# Patient Record
Sex: MALE | Race: WHITE | NOT HISPANIC OR LATINO | Employment: FULL TIME | URBAN - METROPOLITAN AREA
[De-identification: names, ages, dates, MRNs, and addresses within clinical notes are randomized per-mention and may not be internally consistent; named-entity substitution may affect disease eponyms.]

---

## 2017-02-28 ENCOUNTER — HOSPITAL ENCOUNTER (EMERGENCY)
Facility: OTHER | Age: 47
Discharge: HOME | End: 2017-03-01
Attending: EMERGENCY MEDICINE
Payer: COMMERCIAL

## 2017-02-28 DIAGNOSIS — K52.9 ACUTE GASTROENTERITIS: Primary | ICD-10-CM

## 2017-02-28 PROCEDURE — 99283 EMERGENCY DEPT VISIT LOW MDM: CPT | Mod: 25

## 2017-02-28 NOTE — ED AVS SNAPSHOT
[facility] EMERGENCY DEPARTMENT  [address] [place] [address]               Rafa Crowley   2017 11:25 PM   ED    Description:  Male : 1970   Department:  [facility] Emergency Department           Your Care was Coordinated By:     Provider Role From To    Umair Lakhani MD Attending Provider 17 1247 --      Reason for Visit     Nausea           Diagnoses this Visit        Comments    Acute gastroenteritis    -  Primary       ED Disposition     ED Disposition Condition Comment    Discharge  Patient discharged to home in stable condition.              To Do List           Follow-up Information     Please follow up.    Why:  Follow up with a Primary Care Physician, for re-evaluation of today's complaint, and ongoing care, See resource form to find a doctor nearby.       These Medications        Disp Refills Start End    ondansetron (ZOFRAN) 4 MG tablet 12 tablet 0 3/1/2017     Take 1 tablet (4 mg total) by mouth every 6 (six) hours as needed for Nausea. - Oral    Pharmacy: [facility]/pharmacy #[id number] - [place] - [address] [facility] Ph #: [phone]         [facility]     [facility] On Call Nurse Care Line -  Assistance  Registered nurses in the [facility] Center provide clinical advisement, health education, appointment booking, and other advisory services.  Call for this free service at [phone].             Medications           Message regarding Medications     Verify the changes and/or additions to your medication regime listed below are the same as discussed with your clinician today.  If any of these changes or additions are incorrect, please notify your healthcare provider.        START taking these NEW medications        Refills    ondansetron (ZOFRAN) 4 MG tablet 0    Sig: Take 1 tablet (4 mg total) by mouth every 6 (six) hours as needed for Nausea.    Class: Normal    Route: Oral      These medications were administered today        Dose Freq     ondansetron disintegrating tablet 4 mg 4 mg ED 1 Time    Sig: Take 1 tablet (4 mg total) by mouth ED 1 Time.    Class: Normal    Route: Oral           Verify that the below list of medications is an accurate representation of the medications you are currently taking.  If none reported, the list may be blank. If incorrect, please contact your healthcare provider. Carry this list with you in case of emergency.           Current Medications     ondansetron (ZOFRAN) 4 MG tablet Take 1 tablet (4 mg total) by mouth every 6 (six) hours as needed for Nausea.           Clinical Reference Information           Your Vitals Were     BP                   121/86           Allergies as of 3/1/2017        Reactions    Penicillins       Immunizations Administered on Date of Encounter - 3/1/2017     None      ED Micro, Lab, POCT     Start Ordered       Status Ordering Provider    03/01/17 0042 03/01/17 0042  POCT CMP  Once      Final result     03/01/17 0010 03/01/17 0010  POCT CBC  Once      Acknowledged     03/01/17 0010 03/01/17 0010  POCT CMP  Once      Completed       ED Imaging Orders     None        Discharge Instructions         Diet for Vomiting or Diarrhea (Adult)    Your symptoms may return or get worse after eating certain foods listed below. If this happens, stop eating these foods until your symptoms ease and you feel better.  Once the vomiting stops, follow the steps below.   During the first 12 to 24 hours  During the first 12 to 24 hours, follow this diet:  · Drinks. Plain water, sport drinks like electrolyte solutions, soft drinks without caffeine, mineral water (plain or flavored), clear fruit juices, and decaffeinated tea and coffee.  · Soups. Clear broth.  · Desserts. Plain gelatin, popsicles, and fruit juice bars. As you feel better, you may add 6 to 8 ounces of yogurt per day. If you have diarrhea, don't have foods or drinks that contain sugar, high-fructose corn syrup, or sugar alcohols.  During the next 24  hours  During the next 24 hours you may add the following to the above:  · Hot cereal, plain toast, bread, rolls, and crackers  · Plain noodles, rice, mashed potatoes, and chicken noodle or rice soup  · Unsweetened canned fruit (but not pineapple) and bananas  Don't eat more than 15 grams of fat a day. Do this by staying away from margarine, butter, oils, mayonnaise, sauces, gravies, fried foods, peanut butter, meat, poultry, and fish.  Don't eat much fiber. Stay away from raw or cooked vegetables, fresh fruits (except bananas), and bran cereals.  Limit how much caffeine and chocolate you have. Do not use any spices or seasonings except salt.  During the next 24 hours  Slowly go back to your normal diet, as you feel better and your symptoms ease.  Date Last Reviewed: 8/1/2016  © 0008-2171 [employer] [address] [place] [address]. All rights reserved. This information is not intended as a substitute for professional medical care. Always follow your healthcare professional's instructions.          [facility] Sign-Up     Activating your [facility] account is as easy as 1-2-3!     1) Visit [email or web address], select Sign Up Now, enter this activation code and your date of birth, then select Next.  [email or web address]  Expires: 4/15/2017  1:06 AM      2) Create a username and password to use when you visit [facility] in the future and select a security question in case you lose your password and select Next.    3) Enter your e-mail address and click Sign Up!    Additional Information  If you have questions, please e-mail [email or web address] or call [phone] to talk to our [facility] staff. Remember, [facility] is NOT to be used for urgent needs. For medical emergencies, dial 911.          [facility] Emergency Department complies with applicable Federal civil rights laws and does not discriminate on the basis of race, color, national origin, age, disability, or sex.        Language Assistance  Services     ATTENTION: Language assistance services are available, free of charge. Please call [phone].      ATENCIÓN: Si habla español, tiene a marley disposición servicios gratuitos de asistencia lingüística. Llame al [phone].     CHÚ Ý: N?u b?n nói Ti?ng Vi?t, có các d?ch v? h? tr? ngôn ng? mi?n phí dành cho b?n. G?i s? [phone].

## 2017-03-01 ENCOUNTER — HOSPITAL ENCOUNTER (EMERGENCY)
Facility: OTHER | Age: 47
Discharge: ANOTHER HOSPITAL | End: 2017-03-01
Attending: EMERGENCY MEDICINE
Payer: COMMERCIAL

## 2017-03-01 VITALS
DIASTOLIC BLOOD PRESSURE: 82 MMHG | HEART RATE: 90 BPM | OXYGEN SATURATION: 98 % | SYSTOLIC BLOOD PRESSURE: 120 MMHG | RESPIRATION RATE: 18 BRPM | TEMPERATURE: 98 F

## 2017-03-01 VITALS
OXYGEN SATURATION: 97 % | RESPIRATION RATE: 20 BRPM | BODY MASS INDEX: 33.74 KG/M2 | HEIGHT: 67 IN | HEART RATE: 116 BPM | TEMPERATURE: 99 F | WEIGHT: 215 LBS | DIASTOLIC BLOOD PRESSURE: 87 MMHG | SYSTOLIC BLOOD PRESSURE: 140 MMHG

## 2017-03-01 DIAGNOSIS — R10.84 GENERALIZED ABDOMINAL PAIN: ICD-10-CM

## 2017-03-01 DIAGNOSIS — K56.609 SMALL BOWEL OBSTRUCTION: Primary | ICD-10-CM

## 2017-03-01 LAB
ALBUMIN SERPL-MCNC: 3.8 G/DL (ref 3.3–5.5)
ALBUMIN SERPL-MCNC: 3.8 G/DL (ref 3.3–5.5)
ALBUMIN SERPL-MCNC: 4.1 G/DL (ref 3.3–5.5)
ALP SERPL-CCNC: 61 U/L (ref 42–141)
ALP SERPL-CCNC: 67 U/L (ref 42–141)
ALP SERPL-CCNC: 76 U/L (ref 42–141)
BILIRUB SERPL-MCNC: 0.5 MG/DL (ref 0.2–1.6)
BILIRUB SERPL-MCNC: 0.5 MG/DL (ref 0.2–1.6)
BILIRUB SERPL-MCNC: 0.6 MG/DL (ref 0.2–1.6)
BUN SERPL-MCNC: 17 MG/DL (ref 7–22)
BUN SERPL-MCNC: 20 MG/DL (ref 7–22)
CALCIUM SERPL-MCNC: 9 MG/DL (ref 8–10.3)
CALCIUM SERPL-MCNC: 9.4 MG/DL (ref 8–10.3)
CHLORIDE SERPL-SCNC: 102 MMOL/L (ref 98–108)
CHLORIDE SERPL-SCNC: 99 MMOL/L (ref 98–108)
CREAT SERPL-MCNC: 0.9 MG/DL (ref 0.6–1.2)
CREAT SERPL-MCNC: 0.9 MG/DL (ref 0.6–1.2)
GLUCOSE SERPL-MCNC: 123 MG/DL (ref 73–118)
GLUCOSE SERPL-MCNC: 140 MG/DL (ref 73–118)
POC ALT (SGPT): 26 (ref 10–47)
POC ALT (SGPT): 31 (ref 10–47)
POC ALT (SGPT): 34 (ref 10–47)
POC AMYLASE: 10 (ref 14–97)
POC AST (SGOT): 27 (ref 11–38)
POC AST (SGOT): 41 (ref 11–38)
POC AST (SGOT): 43 (ref 11–38)
POC GGT: 63 (ref 5–65)
POC TCO2: 22 (ref 18–33)
POC TCO2: 22 (ref 18–33)
POTASSIUM BLD-SCNC: 3.9 MMOL/L (ref 3.6–5.1)
POTASSIUM BLD-SCNC: 4.6 MMOL/L (ref 3.6–5.1)
PROTEIN, POC: 7.2 (ref 6.4–8.1)
PROTEIN, POC: 7.6 (ref 6.4–8.1)
PROTEIN, POC: 7.8 (ref 6.4–8.1)
SODIUM BLD-SCNC: 136 MMOL/L (ref 128–145)
SODIUM BLD-SCNC: 140 MMOL/L (ref 128–145)

## 2017-03-01 PROCEDURE — 96375 TX/PRO/DX INJ NEW DRUG ADDON: CPT

## 2017-03-01 PROCEDURE — 63600175 PHARM REV CODE 636 W HCPCS: Performed by: EMERGENCY MEDICINE

## 2017-03-01 PROCEDURE — 96374 THER/PROPH/DIAG INJ IV PUSH: CPT

## 2017-03-01 PROCEDURE — 99285 EMERGENCY DEPT VISIT HI MDM: CPT | Mod: ,,, | Performed by: EMERGENCY MEDICINE

## 2017-03-01 PROCEDURE — 99285 EMERGENCY DEPT VISIT HI MDM: CPT | Mod: 25

## 2017-03-01 PROCEDURE — 99285 EMERGENCY DEPT VISIT HI MDM: CPT | Mod: 25,27

## 2017-03-01 PROCEDURE — 96372 THER/PROPH/DIAG INJ SC/IM: CPT

## 2017-03-01 PROCEDURE — 96361 HYDRATE IV INFUSION ADD-ON: CPT

## 2017-03-01 PROCEDURE — 25000003 PHARM REV CODE 250: Performed by: EMERGENCY MEDICINE

## 2017-03-01 PROCEDURE — 80053 COMPREHEN METABOLIC PANEL: CPT

## 2017-03-01 PROCEDURE — 85025 COMPLETE CBC W/AUTO DIFF WBC: CPT

## 2017-03-01 PROCEDURE — 63600175 PHARM REV CODE 636 W HCPCS: Performed by: INTERNAL MEDICINE

## 2017-03-01 RX ORDER — ONDANSETRON 4 MG/1
4 TABLET, ORALLY DISINTEGRATING ORAL
Status: COMPLETED | OUTPATIENT
Start: 2017-03-01 | End: 2017-03-01

## 2017-03-01 RX ORDER — KETOROLAC TROMETHAMINE 30 MG/ML
30 INJECTION, SOLUTION INTRAMUSCULAR; INTRAVENOUS
Status: COMPLETED | OUTPATIENT
Start: 2017-03-01 | End: 2017-03-01

## 2017-03-01 RX ORDER — ONDANSETRON 2 MG/ML
4 INJECTION INTRAMUSCULAR; INTRAVENOUS
Status: COMPLETED | OUTPATIENT
Start: 2017-03-01 | End: 2017-03-01

## 2017-03-01 RX ORDER — HYDROMORPHONE HYDROCHLORIDE 2 MG/ML
2 INJECTION, SOLUTION INTRAMUSCULAR; INTRAVENOUS; SUBCUTANEOUS
Status: COMPLETED | OUTPATIENT
Start: 2017-03-01 | End: 2017-03-01

## 2017-03-01 RX ORDER — ONDANSETRON 4 MG/1
4 TABLET, FILM COATED ORAL EVERY 6 HOURS PRN
Qty: 12 TABLET | Refills: 0 | Status: SHIPPED | OUTPATIENT
Start: 2017-03-01 | End: 2017-03-15

## 2017-03-01 RX ORDER — ONDANSETRON 4 MG/1
4 TABLET, FILM COATED ORAL EVERY 6 HOURS PRN
Qty: 12 TABLET | Refills: 0 | Status: SHIPPED | OUTPATIENT
Start: 2017-03-01 | End: 2017-03-01

## 2017-03-01 RX ORDER — DICYCLOMINE HYDROCHLORIDE 10 MG/ML
20 INJECTION INTRAMUSCULAR
Status: COMPLETED | OUTPATIENT
Start: 2017-03-01 | End: 2017-03-01

## 2017-03-01 RX ADMIN — ONDANSETRON 4 MG: 2 INJECTION INTRAMUSCULAR; INTRAVENOUS at 06:03

## 2017-03-01 RX ADMIN — SODIUM CHLORIDE 1000 ML: 0.9 INJECTION, SOLUTION INTRAVENOUS at 06:03

## 2017-03-01 RX ADMIN — KETOROLAC TROMETHAMINE 30 MG: 30 INJECTION, SOLUTION INTRAMUSCULAR at 06:03

## 2017-03-01 RX ADMIN — HYDROMORPHONE HYDROCHLORIDE 2 MG: 2 INJECTION, SOLUTION INTRAMUSCULAR; INTRAVENOUS; SUBCUTANEOUS at 09:03

## 2017-03-01 RX ADMIN — ONDANSETRON 4 MG: 4 TABLET, ORALLY DISINTEGRATING ORAL at 12:03

## 2017-03-01 RX ADMIN — DICYCLOMINE HYDROCHLORIDE 20 MG: 20 INJECTION, SOLUTION INTRAMUSCULAR at 06:03

## 2017-03-01 NOTE — DISCHARGE INSTRUCTIONS
Diet for Vomiting or Diarrhea (Adult)    Your symptoms may return or get worse after eating certain foods listed below. If this happens, stop eating these foods until your symptoms ease and you feel better.  Once the vomiting stops, follow the steps below.   During the first 12 to 24 hours  During the first 12 to 24 hours, follow this diet:  · Drinks. Plain water, sport drinks like electrolyte solutions, soft drinks without caffeine, mineral water (plain or flavored), clear fruit juices, and decaffeinated tea and coffee.  · Soups. Clear broth.  · Desserts. Plain gelatin, popsicles, and fruit juice bars. As you feel better, you may add 6 to 8 ounces of yogurt per day. If you have diarrhea, don't have foods or drinks that contain sugar, high-fructose corn syrup, or sugar alcohols.  During the next 24 hours  During the next 24 hours you may add the following to the above:  · Hot cereal, plain toast, bread, rolls, and crackers  · Plain noodles, rice, mashed potatoes, and chicken noodle or rice soup  · Unsweetened canned fruit (but not pineapple) and bananas  Don't eat more than 15 grams of fat a day. Do this by staying away from margarine, butter, oils, mayonnaise, sauces, gravies, fried foods, peanut butter, meat, poultry, and fish.  Don't eat much fiber. Stay away from raw or cooked vegetables, fresh fruits (except bananas), and bran cereals.  Limit how much caffeine and chocolate you have. Do not use any spices or seasonings except salt.  During the next 24 hours  Slowly go back to your normal diet, as you feel better and your symptoms ease.  Date Last Reviewed: 8/1/2016  © 7560-9623 [employer]. [address] [place] [address], [place] [address]. All rights reserved. This information is not intended as a substitute for professional medical care. Always follow your healthcare professional's instructions.

## 2017-03-01 NOTE — ED PROVIDER NOTES
Encounter Date: 2/28/2017       History     Chief Complaint   Patient presents with    Nausea     waves of nausea w multiple episodes of diarrhea today. Extreme thirst/weakness     Review of patient's allergies indicates:   Allergen Reactions    Penicillins      Patient is a 46 y.o. male presenting with the following complaint: diarrhea.   Diarrhea    This is a new problem. The current episode started today. The problem occurs more than 10 times per day. The problem has been gradually improving. The stool consistency is described as watery. Associated symptoms include bloating, chills and myalgias. Pertinent negatives include no abdominal pain, arthralgias, coughing, fever, headaches or vomiting. Risk factors: ate at [employer] last night which he suspects may have been bad. He has tried anti-motility drug for the symptoms. The treatment provided mild relief.     No past medical history on file.  Past Surgical History:   Procedure Laterality Date    knee scope       Family History   Problem Relation Age of Onset    Cancer Mother      kidney    Cancer Father      bladder    Cancer Maternal Aunt      kidney     Social History   Substance Use Topics    Smoking status: Never Smoker    Smokeless tobacco: Not on file    Alcohol use Yes      Comment: socially     Review of Systems   Constitutional: Positive for chills. Negative for fever.   HENT: Negative.    Eyes: Negative.    Respiratory: Negative for cough, shortness of breath and stridor.    Cardiovascular: Negative for chest pain and palpitations.   Gastrointestinal: Positive for bloating, constipation (occasional), diarrhea and nausea. Negative for abdominal pain, blood in stool and vomiting.   Genitourinary: Negative for dysuria and hematuria.   Musculoskeletal: Positive for myalgias. Negative for arthralgias.   Skin: Negative.    Neurological: Negative for dizziness and headaches.   Psychiatric/Behavioral: Negative.    All other systems reviewed and are  negative.      Physical Exam   Initial Vitals   BP Pulse Resp Temp SpO2   02/28/17 2327 02/28/17 2327 02/28/17 2327 02/28/17 2327 02/28/17 2327   121/86 99 18 98.4 °F (36.9 °C) 98 %     Physical Exam    Nursing note and vitals reviewed.  Constitutional: He appears well-developed and well-nourished. He is not diaphoretic. No distress.   HENT:   Head: Normocephalic and atraumatic.   Mouth/Throat: Oropharynx is clear and moist. No oropharyngeal exudate.   Eyes: EOM are normal. Pupils are equal, round, and reactive to light. No scleral icterus.   Neck: Normal range of motion. Neck supple.   Cardiovascular: Normal rate, regular rhythm and intact distal pulses.   No murmur heard.  Pulmonary/Chest: Breath sounds normal. No stridor. No respiratory distress. He has no wheezes. He has no rhonchi. He exhibits no tenderness.   Abdominal: Soft. Bowel sounds are normal. He exhibits no distension. There is no tenderness. There is no rebound and no guarding.   Musculoskeletal: Normal range of motion. He exhibits no edema.   Neurological: He is alert and oriented to person, place, and time. He has normal strength.   Skin: Skin is warm. No pallor.   Psychiatric: He has a normal mood and affect.         ED Course   Procedures  Labs Reviewed   POCT CMP   POCT CMP                               ED Course     Labs Reviewed  Admission on 03/01/2017, Discharged on 03/01/2017   Component Date Value Ref Range Status    Albumin, POC 03/01/2017 3.8  3.3 - 5.5 Final    Alkaline Phosphatase, POC 03/01/2017 67  42 - 141 Final    ALT (SGPT), POC 03/01/2017 34  10.0 - 47.0 Final    AST (SGOT), POC 03/01/2017 43  11.0 - 38 Final    POC BUN 03/01/2017 20  7.0 - 22.0 Final    Calcium, POC 03/01/2017 9.4  8.0 - 10.3 Final    POC Chloride 03/01/2017 99  98 - 108 Final    POC Creatinine 03/01/2017 0.9  0.6 - 1.2 Final    POC Glucose 03/01/2017 140  73 - 118 Final    POC Potassium 03/01/2017 4.6  3.6 - 5.1 Final    POC Sodium 03/01/2017 136  128  - 145 Final    Bilirubin 03/01/2017 0.5  0.2 - 1.6 Final    POC TCO2 03/01/2017 22  18 - 33 Final    Protein 03/01/2017 7.6  6.4 - 8.1 Final    Albumin, POC 03/01/2017 4.1  3.3 - 5.5 Final    Alkaline Phosphatase, POC 03/01/2017 61  42 - 141 Final    ALT (SGPT), POC 03/01/2017 26  10.0 - 47.0 Final    Amylase, POC 03/01/2017 10  14 - 97 Final    AST (SGOT), POC 03/01/2017 41  11.0 - 38 Final    POC GGT 03/01/2017 63  5.0 - 65.0 Final    Bilirubin 03/01/2017 0.6  0.2 - 1.6 Final    Protein 03/01/2017 7.8  6.4 - 8.1 Final   Admission on 02/28/2017, Discharged on 03/01/2017   Component Date Value Ref Range Status    Albumin, POC 03/01/2017 3.8  3.3 - 5.5 Final    Alkaline Phosphatase, POC 03/01/2017 76  42 - 141 Final    ALT (SGPT), POC 03/01/2017 31  10.0 - 47.0 Final    AST (SGOT), POC 03/01/2017 27  11.0 - 38 Final    POC BUN 03/01/2017 17  7.0 - 22.0 Final    Calcium, POC 03/01/2017 9.0  8.0 - 10.3 Final    POC Chloride 03/01/2017 102  98 - 108 Final    POC Creatinine 03/01/2017 0.9  0.6 - 1.2 Final    POC Glucose 03/01/2017 123  73 - 118 Final    POC Potassium 03/01/2017 3.9  3.6 - 5.1 Final    POC Sodium 03/01/2017 140  128 - 145 Final    Bilirubin 03/01/2017 0.5  0.2 - 1.6 Final    POC TCO2 03/01/2017 22  18 - 33 Final    Protein 03/01/2017 7.2  6.4 - 8.1 Final        Imaging Reviewed    Imaging Results     None          Medications given in ED    Medications   ondansetron disintegrating tablet 4 mg (4 mg Oral Given 3/1/17 0045)       Discharge Medications     Discharge Medication List as of 3/1/2017  1:07 AM      START taking these medications    Details   ondansetron (ZOFRAN) 4 MG tablet Take 1 tablet (4 mg total) by mouth every 6 (six) hours as needed for Nausea., Starting 3/1/2017, Until Discontinued, Normal                   Patient discharged to home in stable condition with instructions to:   1. Please take all meds as prescribed.  2. Follow-up with your primary care doctor   3.  Return precautions discussed and patient and/or family/caretaker understands to return to the emergency room for any concerns including worsening of your current symptoms, fever, chills, night sweats, worsening pain, chest pain, shortness of breath, nausea, vomiting, diarrhea, bleeding, headache, difficulty talking, visual disturbances, weakness, numbness or any other acute concerns    Clinical Impression:   The encounter diagnosis was Acute gastroenteritis.          Umair Lakhani MD  04/01/17 0800

## 2017-03-02 ENCOUNTER — ANESTHESIA EVENT (OUTPATIENT)
Dept: SURGERY | Facility: HOSPITAL | Age: 47
DRG: 337 | End: 2017-03-02
Payer: COMMERCIAL

## 2017-03-02 ENCOUNTER — HOSPITAL ENCOUNTER (INPATIENT)
Facility: HOSPITAL | Age: 47
LOS: 5 days | Discharge: HOME | DRG: 337 | End: 2017-03-07
Attending: EMERGENCY MEDICINE | Admitting: SURGERY
Payer: COMMERCIAL

## 2017-03-02 DIAGNOSIS — Z01.89 ENCOUNTER FOR IMAGING STUDY TO CONFIRM NASOGASTRIC TUBE PLACEMENT: ICD-10-CM

## 2017-03-02 DIAGNOSIS — K56.609 SMALL BOWEL OBSTRUCTION: Primary | ICD-10-CM

## 2017-03-02 DIAGNOSIS — J40 BRONCHITIS: ICD-10-CM

## 2017-03-02 LAB
ANION GAP SERPL CALC-SCNC: 11 MMOL/L
BASOPHILS # BLD AUTO: 0.01 K/UL
BASOPHILS NFR BLD: 0.2 %
BUN SERPL-MCNC: 27 MG/DL
CALCIUM SERPL-MCNC: 9.2 MG/DL
CHLORIDE SERPL-SCNC: 100 MMOL/L
CO2 SERPL-SCNC: 23 MMOL/L
CREAT SERPL-MCNC: 1.1 MG/DL
DIFFERENTIAL METHOD: ABNORMAL
EOSINOPHIL # BLD AUTO: 0 K/UL
EOSINOPHIL NFR BLD: 0.2 %
ERYTHROCYTE [DISTWIDTH] IN BLOOD BY AUTOMATED COUNT: 13.4 %
EST. GFR  (AFRICAN AMERICAN): >60 ML/MIN/1.73 M^2
EST. GFR  (NON AFRICAN AMERICAN): >60 ML/MIN/1.73 M^2
GLUCOSE SERPL-MCNC: 128 MG/DL
HCT VFR BLD AUTO: 45.1 %
HGB BLD-MCNC: 15.4 G/DL
LYMPHOCYTES # BLD AUTO: 1 K/UL
LYMPHOCYTES NFR BLD: 20 %
MAGNESIUM SERPL-MCNC: 2.1 MG/DL
MCH RBC QN AUTO: 29.8 PG
MCHC RBC AUTO-ENTMCNC: 34.1 %
MCV RBC AUTO: 87 FL
MONOCYTES # BLD AUTO: 1.1 K/UL
MONOCYTES NFR BLD: 22.6 %
NEUTROPHILS # BLD AUTO: 2.8 K/UL
NEUTROPHILS NFR BLD: 56.4 %
PHOSPHATE SERPL-MCNC: 2.9 MG/DL
PLATELET # BLD AUTO: 190 K/UL
PMV BLD AUTO: 10 FL
POTASSIUM SERPL-SCNC: 3.7 MMOL/L
RBC # BLD AUTO: 5.17 M/UL
SODIUM SERPL-SCNC: 134 MMOL/L
WBC # BLD AUTO: 5.01 K/UL

## 2017-03-02 PROCEDURE — 25500020 PHARM REV CODE 255: Performed by: SURGERY

## 2017-03-02 PROCEDURE — 63600175 PHARM REV CODE 636 W HCPCS: Performed by: SURGERY

## 2017-03-02 PROCEDURE — 87045 FECES CULTURE AEROBIC BACT: CPT

## 2017-03-02 PROCEDURE — 87046 STOOL CULTR AEROBIC BACT EA: CPT

## 2017-03-02 PROCEDURE — 85025 COMPLETE CBC W/AUTO DIFF WBC: CPT

## 2017-03-02 PROCEDURE — 25000003 PHARM REV CODE 250: Performed by: SURGERY

## 2017-03-02 PROCEDURE — 87209 SMEAR COMPLEX STAIN: CPT

## 2017-03-02 PROCEDURE — 87449 NOS EACH ORGANISM AG IA: CPT

## 2017-03-02 PROCEDURE — 84100 ASSAY OF PHOSPHORUS: CPT

## 2017-03-02 PROCEDURE — 87427 SHIGA-LIKE TOXIN AG IA: CPT

## 2017-03-02 PROCEDURE — 11000001 HC ACUTE MED/SURG PRIVATE ROOM

## 2017-03-02 PROCEDURE — 83735 ASSAY OF MAGNESIUM: CPT

## 2017-03-02 PROCEDURE — 25000003 PHARM REV CODE 250: Performed by: FAMILY MEDICINE

## 2017-03-02 PROCEDURE — 94761 N-INVAS EAR/PLS OXIMETRY MLT: CPT

## 2017-03-02 PROCEDURE — 99223 1ST HOSP IP/OBS HIGH 75: CPT | Mod: ,,, | Performed by: SURGERY

## 2017-03-02 PROCEDURE — 80048 BASIC METABOLIC PNL TOTAL CA: CPT

## 2017-03-02 PROCEDURE — 25500020 PHARM REV CODE 255: Performed by: EMERGENCY MEDICINE

## 2017-03-02 RX ORDER — ENOXAPARIN SODIUM 100 MG/ML
40 INJECTION SUBCUTANEOUS
Status: DISCONTINUED | OUTPATIENT
Start: 2017-03-03 | End: 2017-03-07 | Stop reason: HOSPADM

## 2017-03-02 RX ORDER — SODIUM CHLORIDE 0.9 % (FLUSH) 0.9 %
3 SYRINGE (ML) INJECTION EVERY 8 HOURS
Status: DISCONTINUED | OUTPATIENT
Start: 2017-03-02 | End: 2017-03-07 | Stop reason: HOSPADM

## 2017-03-02 RX ORDER — POTASSIUM CHLORIDE 7.45 MG/ML
10 INJECTION INTRAVENOUS
Status: DISPENSED | OUTPATIENT
Start: 2017-03-02 | End: 2017-03-02

## 2017-03-02 RX ORDER — POTASSIUM CHLORIDE 7.45 MG/ML
30 INJECTION INTRAVENOUS ONCE
Status: COMPLETED | OUTPATIENT
Start: 2017-03-02 | End: 2017-03-02

## 2017-03-02 RX ORDER — MORPHINE SULFATE 2 MG/ML
2 INJECTION, SOLUTION INTRAMUSCULAR; INTRAVENOUS
Status: DISCONTINUED | OUTPATIENT
Start: 2017-03-02 | End: 2017-03-03

## 2017-03-02 RX ORDER — LIDOCAINE HYDROCHLORIDE 20 MG/ML
JELLY TOPICAL
Status: COMPLETED | OUTPATIENT
Start: 2017-03-02 | End: 2017-03-02

## 2017-03-02 RX ORDER — SODIUM CHLORIDE 9 MG/ML
INJECTION, SOLUTION INTRAVENOUS CONTINUOUS
Status: DISCONTINUED | OUTPATIENT
Start: 2017-03-02 | End: 2017-03-07

## 2017-03-02 RX ORDER — ONDANSETRON 2 MG/ML
4 INJECTION INTRAMUSCULAR; INTRAVENOUS EVERY 8 HOURS PRN
Status: DISCONTINUED | OUTPATIENT
Start: 2017-03-02 | End: 2017-03-07 | Stop reason: HOSPADM

## 2017-03-02 RX ORDER — DIPHENHYDRAMINE HYDROCHLORIDE 50 MG/ML
25 INJECTION INTRAMUSCULAR; INTRAVENOUS EVERY 4 HOURS PRN
Status: DISCONTINUED | OUTPATIENT
Start: 2017-03-02 | End: 2017-03-07 | Stop reason: HOSPADM

## 2017-03-02 RX ADMIN — IOHEXOL 250 ML: 350 INJECTION, SOLUTION INTRAVENOUS at 08:03

## 2017-03-02 RX ADMIN — MORPHINE SULFATE 2 MG: 2 INJECTION, SOLUTION INTRAMUSCULAR; INTRAVENOUS at 04:03

## 2017-03-02 RX ADMIN — ONDANSETRON 4 MG: 2 INJECTION INTRAMUSCULAR; INTRAVENOUS at 02:03

## 2017-03-02 RX ADMIN — LIDOCAINE HYDROCHLORIDE 10 ML: 20 JELLY TOPICAL at 01:03

## 2017-03-02 RX ADMIN — IOHEXOL 100 ML: 350 INJECTION, SOLUTION INTRAVENOUS at 05:03

## 2017-03-02 RX ADMIN — ONDANSETRON 4 MG: 2 INJECTION INTRAMUSCULAR; INTRAVENOUS at 04:03

## 2017-03-02 RX ADMIN — PROMETHAZINE HYDROCHLORIDE 12.5 MG: 25 INJECTION, SOLUTION INTRAMUSCULAR; INTRAVENOUS at 07:03

## 2017-03-02 RX ADMIN — POTASSIUM CHLORIDE 30 MEQ: 10 INJECTION, SOLUTION INTRAVENOUS at 03:03

## 2017-03-02 RX ADMIN — Medication 3 ML: at 06:03

## 2017-03-02 RX ADMIN — Medication 3 ML: at 02:03

## 2017-03-02 RX ADMIN — SODIUM CHLORIDE: 0.9 INJECTION, SOLUTION INTRAVENOUS at 04:03

## 2017-03-02 NOTE — PLAN OF CARE
Problem: Patient Care Overview  Goal: Plan of Care Review  Outcome: Ongoing (interventions implemented as appropriate)  Plan of care reviewed and updated. Pt AA+O. Pt's pain is managed with the medication ordered at this time. Pt's VS are as charted.  No falls this shift. Pt is oriented to room and call system. Will continue to [facility].

## 2017-03-02 NOTE — H&P
"History & Physical  Surgery      SUBJECTIVE:     Chief Complaint/Reason for Admission: nausea/vomiting    History of Present Illness: Rafa Crowley is a 46 y.o. male with no significant surgical or medical history who presents with symptoms since yesterday morning at 10am. He began having nausea and large volume emesis and diarrhea that looked like "dishwater." He has had 15-20 watery stools and 10 episodes of vomiting during this time. He also reports abdominal and back pain and chills. He denies sick contacts. He has never had abdominal surgery.       Current Facility-Administered Medications on File Prior to Encounter   Medication    [COMPLETED] dicyclomine injection 20 mg    [COMPLETED] hydromorphone (PF) injection 2 mg    [COMPLETED] ketorolac injection 30 mg    [COMPLETED] ondansetron injection 4 mg    [COMPLETED] sodium chloride 0.9% bolus 1,000 mL     Current Outpatient Prescriptions on File Prior to Encounter   Medication Sig    ondansetron (ZOFRAN) 4 MG tablet Take 1 tablet (4 mg total) by mouth every 6 (six) hours as needed for Nausea.       Review of patient's allergies indicates:   Allergen Reactions    Penicillins        History reviewed. No pertinent past medical history.  Past Surgical History:   Procedure Laterality Date    knee scope       Family History   Problem Relation Age of Onset    Cancer Mother      kidney    Cancer Father      bladder    Cancer Maternal Aunt      kidney     Social History   Substance Use Topics    Smoking status: Never Smoker    Smokeless tobacco: None    Alcohol use Yes      Comment: socially        Review of Systems   Constitutional: Positive for chills, diaphoresis and fatigue. Negative for fever and unexpected weight change.   Respiratory: Negative for cough and shortness of breath.    Cardiovascular: Negative for chest pain and palpitations.   Gastrointestinal: Positive for abdominal distention, abdominal pain, diarrhea, nausea and vomiting. Negative " for constipation.   Genitourinary: Negative for dysuria and hematuria.   Musculoskeletal: Negative for back pain.   Neurological: Negative for tremors and weakness.     OBJECTIVE:     Vital Signs (Most Recent)  Temp: 98.3 °F (36.8 °C) (03/01/17 2252)  Pulse: 104 (03/02/17 0202)  Resp: 18 (03/01/17 2252)  BP: (!) 157/77 (03/02/17 0202)  SpO2: 100 % (03/02/17 0202)    Physical Exam   Constitutional: He is oriented to person, place, and time. He appears well-developed and well-nourished. No distress.   HENT:   Head: Normocephalic and atraumatic.   Eyes: No scleral icterus.   Neck: No JVD present.   Cardiovascular: Normal heart sounds and intact distal pulses.    Pulmonary/Chest: Breath sounds normal. No respiratory distress.   Abdominal: Soft. Bowel sounds are normal. He exhibits distension. There is no tenderness. There is no rebound and no guarding. No hernia.   Musculoskeletal: He exhibits no edema.   Neurological: He is alert and oriented to person, place, and time.   Skin: Skin is warm and dry.   Psychiatric: He has a normal mood and affect.     Laboratory  CBC: No results for input(s): WBC, RBC, HGB, HCT, PLT, MCV, MCH, MCHC in the last 168 hours.  BMP: No results for input(s): GLU, NA, K, CL, CO2, BUN, CREATININE, CALCIUM, MG in the last 168 hours.  No results for input(s): COLORU, CLARITYU, SPECGRAV, PHUR, PROTEINUA, GLUCOSEU, BILIRUBINCON, BLOODU, WBCU, RBCU, BACTERIA, MUCUS, NITRITE, LEUKOCYTESUR, UROBILINOGEN, HYALINECASTS in the last 168 hours.    Diagnostic Results:  FINDINGS:  The visualized lung bases are unremarkable.  There are no renal stones and no hydronephrosis.  Noncontrast images demonstrate no   definite renal masses.  There are no ureteral calculi or hydroureter.  The bladder is   normal.  The images of the liver, spleen, pancreas, adrenals and gallbladder are normal.  Proximal small bowel show diffuse dilation up to 4.5 cm diameter with multiple air-fluid   levels.  Small bowel gradually  transition to collapsed state in the right lower quadrant.    The appendix is unremarkable.  Colon does not show appreciable wall thickening or   diverticular disease  There is no abdominal lymphadenopathy. There is no pneumoperitoneum or ascites. The   retroperitoneal region appears unremarkable.  The abdominal aorta appears unremarkable without aneursym.  The inferior vena cava appears unremarkable.  There is no pelvic lymphadenopathy or ascites. The inguinal regions are unremarkable.  There are no definite significant osseous abnormalities seen.     IMPRESSION:  Proximal small bowel dilation with multiple air-fluid levels and gradual transition to   collapsed state.  In the proper clinical context, at least partial small bowel   obstruction should be considered.    ASSESSMENT/PLAN:     A/P:    45yo M with nausea, emesis, possible pSBO    Admit to inpatient  Bolus IVF and MIVF  NPO  NG to LIWS  DVT/GI prophylaxis  Repeat CT with PO/IV contrast this morning -- if consistent with SBO, will need diagnostic laparoscopy/laparotomy given no surgical history

## 2017-03-02 NOTE — PROGRESS NOTES
Progress Note  General Surgery    Admit Date: 3/2/2017  Post-operative Day:    Hospital Day: 1    SUBJECTIVE:     Pt seen and examined, no acute events overnight, denies nausea/vomiting, states pain has improved since placement of NG, denies back pain    Scheduled Meds:   [START ON 3/3/2017] enoxaparin  40 mg Subcutaneous Q24H    sodium chloride 0.9%  3 mL Intravenous Q8H     Continuous Infusions:   sodium chloride 0.9% 125 mL/hr at 03/02/17 0416     PRN Meds:diphenhydrAMINE, morphine, ondansetron, promethazine (PHENERGAN) IVPB    Review of patient's allergies indicates:   Allergen Reactions    Penicillins      OBJECTIVE:     Vital Signs (Most Recent)  Temp: 96.7 °F (35.9 °C) (03/02/17 0628)  Pulse: 73 (03/02/17 0628)  Resp: 16 (03/02/17 0628)  BP: 138/71 (03/02/17 0628)  SpO2: 98 % (03/02/17 0628)    Vital Signs Range (Last 24H):  Temp:  [96.7 °F (35.9 °C)-99.4 °F (37.4 °C)]   Pulse:  []   Resp:  [16-20]   BP: (124-157)/(71-89)   SpO2:  [97 %-100 %]     I & O (Last 24H):  Intake/Output Summary (Last 24 hours) at 03/02/17 0735  Last data filed at 03/02/17 0654   Gross per 24 hour   Intake                0 ml   Output             1750 ml   Net            -1750 ml     Physical Exam:  General: well developed, well nourished, no distress  Head: normocephalic, atraumatic  Lungs:  normal respiratory effort  Heart: regular rate and rhythm  Abdomen: soft, mild distension, minimal TTP, no hernias appreciated    Laboratory:  CBC:   Recent Labs  Lab 03/02/17 0444   WBC 5.01   RBC 5.17   HGB 15.4   HCT 45.1      MCV 87   MCH 29.8   MCHC 34.1     BMP:   Recent Labs  Lab 03/02/17 0444   *   *   K 3.7      CO2 23   BUN 27*   CREATININE 1.1   CALCIUM 9.2     CMP:   Recent Labs  Lab 03/02/17  0444   *   CALCIUM 9.2   *   K 3.7   CO2 23      BUN 27*   CREATININE 1.1     Labs within the past 24 hours have been reviewed.      ASSESSMENT/PLAN:   47 yo male w pSBO, no previous  surgical history  -plan for upper GI w small bowel follow through today, +/- diagnostic laparotomy tomorrow depending on results  -continue NPO, IVF, NGT  -serial abdominal exams  -hypokalemia - replace  -pain/nausea meds PRN    Shanna Allen PA-C  [phone]

## 2017-03-02 NOTE — IP AVS SNAPSHOT
[facility]  [address]  [facility] [address]  Phone: [phone]           Patient Discharge Instructions     Our goal is to set you up for success. This packet includes information on your condition, medications, and your home care. It will help you to care for yourself so you don't get sicker and need to go back to the hospital.     Please ask your nurse if you have any questions.        There are many details to remember when preparing to leave the hospital. Here is what you will need to do:    1. Take your medicine. If you are prescribed medications, review your Medication List in the following pages. You may have new medications to  at the pharmacy and others that you'll need to stop taking. Review the instructions for how and when to take your medications. Talk with your doctor or nurses if you are unsure of what to do.     2. Go to your follow-up appointments. Specific follow-up information is listed in the following pages. Your may be contacted by a transition nurse or clinical provider about future appointments. Be sure we have all of the phone numbers to reach you, if needed. Please contact your provider's office if you are unable to make an appointment.     3. Watch for warning signs. Your doctor or nurse will give you detailed warning signs to watch for and when to call for assistance. These instructions may also include educational information about your condition. If you experience any of warning signs to your health, call your doctor.               [facility]  Unless otherwise directed by your provider, please contact [facility], our nurse care line that is available for 24/7 assistance.     [phone] (toll-free)    Registered nurses in the [facility] provide clinical advisement, health education, appointment booking, and other advisory services.                    ** Verify the list of medication(s) below is accurate and up  to date. Carry this with you in case of emergency. If your medications have changed, please notify your healthcare provider.             Medication List      START taking these medications        Additional Info                      docusate sodium 100 MG capsule   Commonly known as:  COLACE   Refills:  0   Dose:  100 mg    Instructions:  Take 1 capsule (100 mg total) by mouth 2 (two) times daily as needed for Constipation.     Begin Date    AM    Noon    PM    Bedtime       oxycodone-acetaminophen 5-325 mg per tablet   Commonly known as:  PERCOCET   Quantity:  51 tablet   Refills:  0   Dose:  1 tablet    Instructions:  Take 1 tablet by mouth every 4 (four) hours as needed.     Begin Date    AM    Noon    PM    Bedtime       polyethylene glycol 17 gram/dose powder   Commonly known as:  GLYCOLAX   Quantity:  289 g   Refills:  0   Dose:  17 g    Instructions:  Take 17 g by mouth once daily.     Begin Date    AM    Noon    PM    Bedtime         CONTINUE taking these medications        Additional Info                      ondansetron 4 MG tablet   Commonly known as:  ZOFRAN   Quantity:  12 tablet   Refills:  0   Dose:  4 mg    Instructions:  Take 1 tablet (4 mg total) by mouth every 6 (six) hours as needed for Nausea.     Begin Date    AM    Noon    PM    Bedtime            Where to Get Your Medications      These medications were sent to [facility]/pharmacy #[id number] - [place] - [address] [facility]  [address] [place] [address]     Phone:  [phone]     polyethylene glycol 17 gram/dose powder         You can get these medications from any pharmacy     Bring a paper prescription for each of these medications     oxycodone-acetaminophen 5-325 mg per tablet       You don't need a prescription for these medications     docusate sodium 100 MG capsule                  Please bring to all follow up appointments:    1. A copy of your discharge instructions.  2. All medicines you are currently taking in their original  bottles.  3. Identification and insurance card.    Please arrive 15 minutes ahead of scheduled appointment time.    Please call 24 hours in advance if you must reschedule your appointment and/or time.        Your Scheduled Appointments     Mar 15, 2017 10:00 AM CDT   Post OP with Rafa Arreguin MD   [facility] ([facility] )    [address]  [facility] [address]   [phone]              Follow-up Information     Follow up with Rafa Arreguin MD In 2 weeks.    Specialty:  General Surgery    Why:  Appt 3/15/17 at 10:00 AM    Contact information:    [address]  [facility] [address]  [phone]        Referrals     Future Orders    Referral to OutPatient  Physical therapy     Questions:    Post Surgical?:  Yes    Eval and Treat:  Yes    Duration:      Frequency (times per week):      Precautions:      Location:      OT Location:      Restore Functional ADL Training?:      Gait Training:      Therapeutic Exercises:      Wound Care:      Traction:      Electric Stimulation:      IONTO.:      U/S:      Developmental Stimulation?:      Specialty Programs:          Discharge Instructions     Future Orders    Call MD for:  difficulty breathing or increased cough     Call MD for:  increased confusion or weakness     Call MD for:  persistent nausea and vomiting or diarrhea     Call MD for:  redness, tenderness, or signs of infection (pain, swelling, redness, odor or green/yellow discharge around incision site)     Call MD for:  severe uncontrolled pain     Call MD for:  temperature >100.4     Call MD for:  worsening rash     Diet general     Questions:    Total calories:      Fat restriction, if any:      Protein restriction, if any:      Na restriction, if any:      Fluid restriction:      Additional restrictions:      Lifting restrictions     Comments:    No lifting weights greater than 10 pounds for 6 weeks after surgery.    No dressing needed     Shower on day dressing  "removed (No bath)     Weight bearing restrictions (specify)     Comments:    No carrying weights greater than 10 pounds for 6 weeks after surgery.        Primary Diagnosis     Your primary diagnosis was:  Small Bowel Obstruction      Admission Information     Date & Time Provider Department CSN    3/2/2017 12:47 AM Rafa Arreguin MD [facility] [id number]      Care Providers     Provider Role Specialty Primary office phone    Rafa Arreguin MD Attending Provider General Surgery [phone]    Rafa Arreguin MD Surgeon  General Surgery [phone]      Your Vitals Were     BP Pulse Temp Resp Height Weight    140/88 (BP Location: Right arm, Patient Position: Lying, BP Method: Automatic) 73 97.9 °F (36.6 °C) (Oral) 16 5' 7" (1.702 m) 97.5 kg (215 lb)    SpO2 BMI             98% 33.67 kg/m2         Recent Lab Values     No lab values to display.      Allergies as of 3/7/2017        Reactions    Penicillins       Advance Directives     An advance directive is a document which, in the event you are no longer able to make decisions for yourself, tells your healthcare team what kind of treatment you do or do not want to receive, or who you would like to make those decisions for you.  If you do not currently have an advance directive, [facility] encourages you to create one.  For more information call:  [phone] ([phone]), [phone] ([phone]),  or log on to [email or web address].        Language Assistance Services     ATTENTION: Language assistance services are available, free of charge. Please call [phone].      ATENCIÓN: Si habla español, tiene a marley disposición servicios gratuitos de asistencia lingüística. Llame al [phone].     CHÚ Ý: N?u b?n nói Ti?ng Vi?t, có các d?ch v? h? tr? ngôn ng? mi?n phí dành cho b?n. G?i s? [phone].        [facility] Sign-Up     Activating your [facility] account is as easy as 1-2-3!     1) Visit [email or web address], select " Sign Up Now, enter this activation code and your date of birth, then select Next.  [email or web address]  Expires: 4/15/2017  1:06 AM      2) Create a username and password to use when you visit [facility] in the future and select a security question in case you lose your password and select Next.    3) Enter your e-mail address and click Sign Up!    Additional Information  If you have questions, please e-mail [email or web address] or call [phone] to talk to our [facility] staff. Remember, [facility] is NOT to be used for urgent needs. For medical emergencies, dial 911.          [facility] complies with applicable Federal civil rights laws and does not discriminate on the basis of race, color, national origin, age, disability, or sex.

## 2017-03-02 NOTE — ANESTHESIA PREPROCEDURE EVALUATION
03/02/2017  Rafa Crowley is a 46 y.o., male who presented with nausea and vomiting and diarrhea with concern for small bowel obstruction. Patient is scheduled for the procedure below.    Pre-operative evaluation for LAPAROSCOPY-DIAGNOSTIC (N/A)    LDA:  PIV 20G L AC  NGT    Previous Airway: none on file    Drips:  NS @ 125 ml/hr      Past Surgical History:   Procedure Laterality Date    knee scope           Vital Signs Range (Last 24H):  Temp:  [35.9 °C (96.7 °F)-37.4 °C (99.4 °F)]   Pulse:  []   Resp:  [16-20]   BP: (124-157)/(71-89)   SpO2:  [97 %-100 %]       CBC:     Recent Labs  Lab 03/02/17  0444   WBC 5.01   RBC 5.17   HGB 15.4   HCT 45.1      MCV 87   MCH 29.8   MCHC 34.1       CMP:   Recent Labs  Lab 03/02/17  0444   *   K 3.7      CO2 23   BUN 27*   CREATININE 1.1   *   MG 2.1   PHOS 2.9   CALCIUM 9.2       INR:  No results for input(s): INR, PROTIME, APTT in the last 720 hours.    Invalid input(s): PT      Diagnostic Studies:  CT Abd Pelvis:   The lung bases demonstrate bibasilar atelectatic changes.  There is no pleural fluid present.  The visualized portions of the heart appear normal.    The liver is normal in size and attenuation with no focal hepatic abnormality.  The gallbladder is unremarkable.  There is no intra-or extrahepatic biliary ductal dilatation.    The stomach, spleen, pancreas, and adrenal glands are unremarkable.  An accessory splenule is noted.  There is an enterogastric tube with its distal tip terminating in the stomach.    The kidneys are normal in size and location and concentrate and excrete contrast properly on delayed imaging.  There is no evidence of hydronephrosis.  The ureters appear normal in course and caliber without evidence of ureteral dilatation. The urinary bladder demonstrates no significant abnormality allowing for limited  evaluation secondary to nondistention.    The abdominal aorta is normal in course and caliber without significant atherosclerotic calcifications.    There marked distention of numerous loops of proximal small bowel, which measure up to 4.5 cm in maximal diameter, within the left midabdomen.  There is associated small bowel air-fluid levels.  Enteric contrast is not identified within several of the more distal loops of dilated small bowel.  There is no evidence of pneumatosis.  There are decompressed loops of small bowel within the right lower quadrant with slight collapse of the colon.  Overall, findings highly concerning for small bowel obstruction transition point in the right lower quadrant.    There is no significant ascites, free intraperitoneal air or portal venous gas identified There is no evidence of lymph node enlargement in the abdomen or pelvis.    When viewed with bone windows the osseous structures are unremarkable.  The extraperitoneal soft tissues are unremarkable.    EKG: none on file      2D Echo: none on file        [facility] Anesthesia Evaluation    I have reviewed the Patient Summary Reports.    I have reviewed the Nursing Notes.   I have reviewed the Medications.     Review of Systems  Anesthesia Hx:  History of prior surgery of interest to airway management or planning: Denies Family Hx of Anesthesia complications.   Denies Personal Hx of Anesthesia complications.   EENT/Dental:EENT/Dental Normal   Cardiovascular:  Cardiovascular Normal     Pulmonary:  Pulmonary Normal    Renal/:  Renal/ Normal     Hepatic/GI:   Small bowel obstruction   Musculoskeletal:  Musculoskeletal Normal    Neurological:  Neurology Normal    Endocrine:  Endocrine Normal    Psych:  Psychiatric Normal           Physical Exam  General:  Well nourished    Airway/Jaw/Neck:  Airway Findings: Mouth Opening: Normal Tongue: Normal  General Airway Assessment: Adult  Mallampati: II  TM Distance: Normal, at least 6 cm       Dental:  Dental Findings: In tact   Chest/Lungs:  Chest/Lungs Findings: Clear to auscultation, Normal Respiratory Rate     Heart/Vascular:  Heart Findings: Rate: Normal  Rhythm: Regular Rhythm        Mental Status:  Mental Status Findings:  Cooperative, Alert and Oriented         Anesthesia Plan  Type of Anesthesia, risks & benefits discussed:  Anesthesia Type:  general  Patient's Preference:   Intra-op Monitoring Plan: standard ASA monitors  Intra-op Monitoring Plan Comments:   Post Op Pain Control Plan:   Post Op Pain Control Plan Comments:   Induction:   IV  Beta Blocker:  Patient is not currently on a Beta-Blocker (No further documentation required).       Informed Consent: Patient understands risks and agrees with Anesthesia plan.  Questions answered. Anesthesia consent signed with patient.  ASA Score: 2     Day of Surgery Review of History & Physical:    H&P update referred to the surgeon.         Ready For Surgery From Anesthesia Perspective.

## 2017-03-02 NOTE — ED TRIAGE NOTES
Pt presents to ED as transfer from [facility] ER for small bowel obstruction. Pt presented to [facility] for nausea, vomiting and abdominal pain.     LOC: Patient name and date of birth verified.  The patient is awake, alert and aware of environment with an appropriate affect, the patient is oriented x 3 and speaking appropriately.  Pt in NAD.    APPEARANCE: Patient resting comfortably and in no acute distress, patient is clean and well groomed, patient's clothing is properly fastened.  SKIN: The skin is warm and dry, color consistent with ethnicity, patient has normal skin turgor and moist mucus membranes, skin intact, no breakdown or brusing noted.  MUSCULOSKELETAL: Patient moving all extremities well, no obvious swelling or deformities noted.  RESPIRATORY: Airway is open and patent, respirations are spontaneous, patient has a normal effort and rate, no accessory muscle use noted.  CARDIAC: Patient has a normal rate and rhythm, no periphreal edema noted, capillary refill < 3 seconds.  ABDOMEN: Soft and non tender to palpation, no distention noted. Bowel sounds present in all four quadrants.Pt reports nausea and vomiting.   NEUROLOGIC: Eyes open spontaneously, behavior appropriate to situation, follows commands, facial expression symmetrical, bilateral hand grasp equal and even, purposeful motor response noted, normal sensation in all extremities when touched with a finger.

## 2017-03-02 NOTE — ED PROVIDER NOTES
Encounter Date: 3/1/2017       History     Chief Complaint   Patient presents with    Small Bowel Obstruction     pt transferred from [facility]     Review of patient's allergies indicates:   Allergen Reactions    Penicillins      HPI Comments: 46-year-old male with no significant past medical history presents as a transfer for evaluation of a partial small bowel obstruction.  Patient is been having intermittent abdominal pain for the past 24 hours associated with diarrhea and nausea and vomiting.  CT done outside facility showed a partial small bowel obstruction.  He is been sent to [facility] for general surgery  evaluation.  At this time pain is under control with his last dose of IV Dilaudid from the outside hospital.  Last episode of emesis was 6 hours ago.  He is ALLERGIC to penicillin.  Patient reports distention of his abdomen over the past 10 hours.    The history is provided by the patient and medical records. No  was used.     History reviewed. No pertinent past medical history.  Past Surgical History:   Procedure Laterality Date    knee scope       Family History   Problem Relation Age of Onset    Cancer Mother      kidney    Cancer Father      bladder    Cancer Maternal Aunt      kidney     Social History   Substance Use Topics    Smoking status: Never Smoker    Smokeless tobacco: None    Alcohol use Yes      Comment: socially     Review of Systems   Constitutional: Negative for fever.   HENT: Negative for nosebleeds.    Respiratory: Negative for chest tightness and shortness of breath.    Cardiovascular: Negative for chest pain.   Gastrointestinal: Positive for abdominal distention, abdominal pain, diarrhea, nausea and vomiting. Negative for constipation.   Genitourinary: Negative for difficulty urinating and dysuria.   Musculoskeletal: Negative for neck stiffness.   Skin: Negative for rash.   Neurological: Negative for weakness and headaches.   Hematological: Negative.     Psychiatric/Behavioral: Negative.    All other systems reviewed and are negative.      Physical Exam   Initial Vitals   BP Pulse Resp Temp SpO2   03/01/17 2252 03/01/17 2252 03/01/17 2252 03/01/17 2252 03/01/17 2252   145/87 90 18 98.3 °F (36.8 °C) 98 %     Physical Exam    Nursing note and vitals reviewed.  Constitutional: He appears well-developed and well-nourished. He is not diaphoretic. No distress.   HENT:   Head: Normocephalic and atraumatic.   Right Ear: External ear normal.   Left Ear: External ear normal.   Eyes: Right eye exhibits no discharge. Left eye exhibits no discharge.   Neck: Normal range of motion. No tracheal deviation present.   Cardiovascular: Normal rate, regular rhythm and normal heart sounds.   No murmur heard.  Pulmonary/Chest: Breath sounds normal. No respiratory distress. He has no wheezes. He has no rhonchi. He has no rales.   Abdominal: He exhibits distension. He exhibits no mass. There is tenderness.   Bowel sounds greatly decreased   Musculoskeletal: Normal range of motion.   Neurological: He is alert and oriented to person, place, and time. He has normal strength. No cranial nerve deficit.   Skin: Skin is warm and dry. No erythema.   Psychiatric: He has a normal mood and affect. His behavior is normal. Judgment and thought content normal.         ED Course   Procedures  Labs Reviewed   CBC W/ AUTO DIFFERENTIAL - Abnormal; Notable for the following:        Result Value    Mono # 1.1 (*)     Mono% 22.6 (*)     All other components within normal limits   BASIC METABOLIC PANEL - Abnormal; Notable for the following:     Sodium 134 (*)     Glucose 128 (*)     BUN, Bld 27 (*)     All other components within normal limits   CULTURE, STOOL   CLOSTRIDIUM DIFFICILE   MAGNESIUM   PHOSPHORUS   STOOL EXAM-OVA,CYSTS,PARASITES     Imaging Results         CT Abdomen Pelvis With Contrast (In process)         X-Ray Abdomen AP 1 View (KUB) (Final result) Result time:  03/02/17 02:10:34    Final  result by Christopher Fish MD (03/02/17 02:10:34)    Impression:             Enteric tube tip in the stomach.    Partial small bowel obstruction pattern.      Electronically signed by: CHRISTOPHER FISH MD  Date:     03/02/17  Time:    02:10     Narrative:    Abdomen, single view.    Indication:.    Findings:    Enteric tube tip in the stomach.    Distended small bowel loops in the upper abdomen. Nondistended colon                      Medical Decision Making:   Initial Assessment:   46-year-old male presents as a transfer from another [facility] facility for partial small bowel disruption  Differential Diagnosis:   Partial SBO, SBO, ileus, nephrolithiasis  ED Management:  Lab work from outside hospital was unremarkable.  White count within normal limits 7K.  We will place an NG tube and talked to the surgical team.  Anticipate admission.  Pain is well-controlled at this time with his last dose of Dilaudid.    HO-II Update:  NG tube placed by RN, Admit orders have been placed by surgery.  We'll continue to monitor while in the ED.    Dyllan Starr MD, PGY- 2  3:34 AM                Attending Attestation:   Physician Attestation Statement for Resident:  As the supervising MD   Physician Attestation Statement: I have personally seen and examined this patient.   I agree with the above history. -:   As the supervising MD I agree with the above PE.    As the supervising MD I agree with the above treatment, course, plan, and disposition.   -: Presentation consistent with SBO.  Also considered mesenteric ischemia, appy, cholecystitis, pancreatitis, constipation but ultimately all felt to be unlikely.  Surgery accepted for admission.    I have reviewed and agree with the residents interpretation of the following: lab data, x-rays and CT scans.  I have reviewed the following: records from a referring facility.                    ED Course     Clinical Impression:   The primary encounter diagnosis was Small bowel obstruction. A  diagnosis of Encounter for imaging study to confirm nasogastric tube placement was also pertinent to this visit.          Gallo Starr MD  Resident  03/02/17 0334       Kenneth Hale MD  03/02/17 1343

## 2017-03-02 NOTE — ED PROVIDER NOTES
Encounter Date: 3/1/2017       History     Chief Complaint   Patient presents with    Vomiting     Pt seen here yesterday for same complaint. Pt stated still having N/V/D     Review of patient's allergies indicates:   Allergen Reactions    Penicillins      The history is provided by the patient, medical records and the spouse.    46-year-old who complains of abdominal pain.  Patient reports generalized abdominal pain that he describes as cramps.  He feels like he is bloated.  He vomited 6 times today and had 4-5 episodes of diarrhea.  He was seen here yesterday and prescribed oral Zofran but is unable to keep this down.  He reports decreased urine output.  He also reports generalized body aches and right flank pain.  Patient symptoms began yesterday.  He is having hot and cold flashes.  He denies dysuria.  There are no aggravating or alleviating factors for his symptoms.  History reviewed. No pertinent past medical history.  Past Surgical History:   Procedure Laterality Date    knee scope       Family History   Problem Relation Age of Onset    Cancer Mother      kidney    Cancer Father      bladder    Cancer Maternal Aunt      kidney     Social History   Substance Use Topics    Smoking status: Never Smoker    Smokeless tobacco: None    Alcohol use Yes      Comment: socially     Review of Systems   Constitutional: Negative for fever.   HENT: Negative for sore throat.    Eyes: Negative for pain.   Respiratory: Negative for shortness of breath.    Cardiovascular: Negative for chest pain.   Gastrointestinal: Positive for abdominal pain, diarrhea, nausea and vomiting.   Genitourinary: Positive for decreased urine volume and flank pain. Negative for dysuria.   Musculoskeletal: Positive for myalgias. Negative for back pain.   Skin: Negative for rash.   Neurological: Positive for weakness. Negative for headaches.       Physical Exam   Initial Vitals   BP Pulse Resp Temp SpO2   03/01/17 1705 03/01/17 1705 03/01/17  1705 03/01/17 1705 03/01/17 1705   141/87 117 20 99.4 °F (37.4 °C) 97 %     Physical Exam    Nursing note and vitals reviewed.  Constitutional: He appears well-developed and well-nourished. He appears distressed.   HENT:   Head: Normocephalic and atraumatic.   Eyes: EOM are normal. Pupils are equal, round, and reactive to light.   Neck: Normal range of motion. Neck supple.   Cardiovascular: Normal rate and regular rhythm.   Pulmonary/Chest: Breath sounds normal.   Abdominal: Soft. Bowel sounds are increased. There is no rebound and no guarding.   Musculoskeletal: Normal range of motion.   Neurological: He is alert and oriented to person, place, and time. No cranial nerve deficit.   Skin: Skin is warm and dry.   Psychiatric: He has a normal mood and affect.         ED Course   Procedures  Labs Reviewed   POCT URINALYSIS W/O SCOPE   POCT CBC   POCT CMP   POCT AMYLASE             Medical Decision Making:   Initial Assessment:   46-year-old who complains of abdominal pain associated with vomiting and diarrhea.  Patient was seen here yesterday with similar symptoms.  He was prescribed Zofran without improvement.  On exam patient appears uncomfortable and has hyperactive bowel sounds and generalized abdominal tenderness  ED Management:  Patient will be given Toradol, Zofran, Bentyl and IV fluids.  CT shows partial proximal small bowel obstruction.  We will discuss with the transfer center regarding overnight observation.   Discussed with Dr Arreguin                   ED Course     Clinical Impression:   The primary encounter diagnosis was Small bowel obstruction. A diagnosis of Generalized abdominal pain was also pertinent to this visit.          Nina Hickey MD  03/01/17 1915       Nina Hickey MD  03/01/17 1937

## 2017-03-02 NOTE — PLAN OF CARE
Patient lives in a 1 story house w/Significant other (SO). SO at BS. Patient not in room;having test in xray today. Not medically stable for discharge: plan pending results of xray test. No needs determined. CM will continue to follow.        03/02/17 1030   Discharge Assessment   Assessment Type Discharge Planning Assessment   Confirmed/corrected address and phone number on facesheet? Yes   Assessment information obtained from? Medical Record;Other  (Significant other)   Expected Length of Stay (days) (3+)   Communicated expected length of stay with patient/caregiver no  (pER md)   Type of Healthcare Directive Received (Unknown)   Prior to hospitilization cognitive status: Alert/Oriented;No Deficits   Prior to hospitalization functional status: Independent   Current cognitive status: Alert/Oriented;No Deficits   Current Functional Status: Independent;Needs Assistance   Arrived From home or self-care  (Via ER)   Lives With significant other   Able to Return to Prior Arrangements yes   Is patient able to care for self after discharge? Yes   How many people do you have in your home that can help with your care after discharge? 1   Who are your caregiver(s) and their phone number(s)? (Signifcant other: Sandra OLIVIA [phone]. )   Patient's perception of discharge disposition home or selfcare   Readmission Within The Last 30 Days no previous admission in last 30 days   Patient currently being followed by outpatient case management? No   Patient currently receives home health services? No   Does the patient currently use HME? No   Patient currently receives private duty nursing? N/A   Patient currently receives any other outside agency services? No   Equipment Currently Used at Home none   Do you have any problems affording any of your prescribed medications? No   Is the patient taking medications as prescribed? yes   Do you have any financial concerns preventing you from receiving the healthcare you need? No   Does the  patient have transportation to healthcare appointments? Yes   Transportation Available car;family or friend will provide   On Dialysis? No   Does the patient receive services at the Coumadin Clinic? No   Are there any open cases? No   Discharge Plan A Home with family   Discharge Plan B Home with family   Patient/Family In Agreement With Plan yes

## 2017-03-03 ENCOUNTER — ANESTHESIA (OUTPATIENT)
Dept: SURGERY | Facility: HOSPITAL | Age: 47
DRG: 337 | End: 2017-03-03
Payer: COMMERCIAL

## 2017-03-03 ENCOUNTER — SURGERY (OUTPATIENT)
Age: 47
End: 2017-03-03

## 2017-03-03 LAB
ANION GAP SERPL CALC-SCNC: 9 MMOL/L
BASOPHILS # BLD AUTO: 0 K/UL
BASOPHILS NFR BLD: 0 %
BUN SERPL-MCNC: 23 MG/DL
C DIFF GDH STL QL: NEGATIVE
C DIFF TOX A+B STL QL IA: NEGATIVE
CALCIUM SERPL-MCNC: 9.2 MG/DL
CHLORIDE SERPL-SCNC: 103 MMOL/L
CO2 SERPL-SCNC: 26 MMOL/L
CREAT SERPL-MCNC: 1 MG/DL
DIFFERENTIAL METHOD: ABNORMAL
EOSINOPHIL # BLD AUTO: 0 K/UL
EOSINOPHIL NFR BLD: 0.6 %
ERYTHROCYTE [DISTWIDTH] IN BLOOD BY AUTOMATED COUNT: 13.7 %
EST. GFR  (AFRICAN AMERICAN): >60 ML/MIN/1.73 M^2
EST. GFR  (NON AFRICAN AMERICAN): >60 ML/MIN/1.73 M^2
GLUCOSE SERPL-MCNC: 98 MG/DL
HCT VFR BLD AUTO: 44.7 %
HGB BLD-MCNC: 15 G/DL
LYMPHOCYTES # BLD AUTO: 1.1 K/UL
LYMPHOCYTES NFR BLD: 22.3 %
MAGNESIUM SERPL-MCNC: 2.2 MG/DL
MCH RBC QN AUTO: 29.9 PG
MCHC RBC AUTO-ENTMCNC: 33.6 %
MCV RBC AUTO: 89 FL
MONOCYTES # BLD AUTO: 1 K/UL
MONOCYTES NFR BLD: 19.9 %
NEUTROPHILS # BLD AUTO: 2.9 K/UL
NEUTROPHILS NFR BLD: 56.8 %
O+P STL TRI STN: NORMAL
PHOSPHATE SERPL-MCNC: 2 MG/DL
PLATELET # BLD AUTO: 189 K/UL
PMV BLD AUTO: 10.4 FL
POTASSIUM SERPL-SCNC: 4.1 MMOL/L
RBC # BLD AUTO: 5.01 M/UL
SODIUM SERPL-SCNC: 138 MMOL/L
WBC # BLD AUTO: 5.12 K/UL

## 2017-03-03 PROCEDURE — 25000003 PHARM REV CODE 250: Performed by: SURGERY

## 2017-03-03 PROCEDURE — 25000003 PHARM REV CODE 250: Performed by: NURSE ANESTHETIST, CERTIFIED REGISTERED

## 2017-03-03 PROCEDURE — 85025 COMPLETE CBC W/AUTO DIFF WBC: CPT

## 2017-03-03 PROCEDURE — 99232 SBSQ HOSP IP/OBS MODERATE 35: CPT | Mod: 57,,, | Performed by: SURGERY

## 2017-03-03 PROCEDURE — 84100 ASSAY OF PHOSPHORUS: CPT

## 2017-03-03 PROCEDURE — 37000009 HC ANESTHESIA EA ADD 15 MINS: Performed by: SURGERY

## 2017-03-03 PROCEDURE — 71000039 HC RECOVERY, EACH ADD'L HOUR: Performed by: SURGERY

## 2017-03-03 PROCEDURE — 63600175 PHARM REV CODE 636 W HCPCS: Performed by: NURSE ANESTHETIST, CERTIFIED REGISTERED

## 2017-03-03 PROCEDURE — 63600175 PHARM REV CODE 636 W HCPCS: Performed by: SURGERY

## 2017-03-03 PROCEDURE — 71000033 HC RECOVERY, INTIAL HOUR: Performed by: SURGERY

## 2017-03-03 PROCEDURE — 80048 BASIC METABOLIC PNL TOTAL CA: CPT

## 2017-03-03 PROCEDURE — D9220A PRA ANESTHESIA: Mod: ANES,,, | Performed by: ANESTHESIOLOGY

## 2017-03-03 PROCEDURE — 37000008 HC ANESTHESIA 1ST 15 MINUTES: Performed by: SURGERY

## 2017-03-03 PROCEDURE — 11000001 HC ACUTE MED/SURG PRIVATE ROOM

## 2017-03-03 PROCEDURE — 0DNB0ZZ RELEASE ILEUM, OPEN APPROACH: ICD-10-PCS | Performed by: SURGERY

## 2017-03-03 PROCEDURE — 83735 ASSAY OF MAGNESIUM: CPT

## 2017-03-03 PROCEDURE — 0WJP4ZZ INSPECTION OF GASTROINTESTINAL TRACT, PERCUTANEOUS ENDOSCOPIC APPROACH: ICD-10-PCS | Performed by: SURGERY

## 2017-03-03 PROCEDURE — 36000706: Performed by: SURGERY

## 2017-03-03 PROCEDURE — 36415 COLL VENOUS BLD VENIPUNCTURE: CPT

## 2017-03-03 PROCEDURE — D9220A PRA ANESTHESIA: Mod: CRNA,,, | Performed by: NURSE ANESTHETIST, CERTIFIED REGISTERED

## 2017-03-03 PROCEDURE — 36000707: Performed by: SURGERY

## 2017-03-03 PROCEDURE — 36000708 HC OR TIME LEV III 1ST 15 MIN: Performed by: SURGERY

## 2017-03-03 PROCEDURE — 36000709 HC OR TIME LEV III EA ADD 15 MIN: Performed by: SURGERY

## 2017-03-03 PROCEDURE — 44005 FREEING OF BOWEL ADHESION: CPT | Mod: ,,, | Performed by: SURGERY

## 2017-03-03 PROCEDURE — 27201423 OPTIME MED/SURG SUP & DEVICES STERILE SUPPLY: Performed by: SURGERY

## 2017-03-03 PROCEDURE — 27100025 HC TUBING, SET FLUID WARMER: Performed by: NURSE ANESTHETIST, CERTIFIED REGISTERED

## 2017-03-03 RX ORDER — NALOXONE HCL 0.4 MG/ML
0.02 VIAL (ML) INJECTION
Status: DISCONTINUED | OUTPATIENT
Start: 2017-03-03 | End: 2017-03-06

## 2017-03-03 RX ORDER — SODIUM CHLORIDE 9 MG/ML
INJECTION, SOLUTION INTRAVENOUS CONTINUOUS PRN
Status: DISCONTINUED | OUTPATIENT
Start: 2017-03-03 | End: 2017-03-03

## 2017-03-03 RX ORDER — FENTANYL CITRATE 50 UG/ML
INJECTION, SOLUTION INTRAMUSCULAR; INTRAVENOUS
Status: DISCONTINUED | OUTPATIENT
Start: 2017-03-03 | End: 2017-03-03

## 2017-03-03 RX ORDER — SUCCINYLCHOLINE CHLORIDE 20 MG/ML
INJECTION INTRAMUSCULAR; INTRAVENOUS
Status: DISCONTINUED | OUTPATIENT
Start: 2017-03-03 | End: 2017-03-03

## 2017-03-03 RX ORDER — GLYCOPYRROLATE 0.2 MG/ML
INJECTION INTRAMUSCULAR; INTRAVENOUS
Status: DISCONTINUED | OUTPATIENT
Start: 2017-03-03 | End: 2017-03-03

## 2017-03-03 RX ORDER — HYDROMORPHONE HCL IN 0.9% NACL 6 MG/30 ML
PATIENT CONTROLLED ANALGESIA SYRINGE INTRAVENOUS CONTINUOUS
Status: DISCONTINUED | OUTPATIENT
Start: 2017-03-03 | End: 2017-03-06

## 2017-03-03 RX ORDER — NEOSTIGMINE METHYLSULFATE 1 MG/ML
INJECTION, SOLUTION INTRAVENOUS
Status: DISCONTINUED | OUTPATIENT
Start: 2017-03-03 | End: 2017-03-03

## 2017-03-03 RX ORDER — PROPOFOL 10 MG/ML
VIAL (ML) INTRAVENOUS
Status: DISCONTINUED | OUTPATIENT
Start: 2017-03-03 | End: 2017-03-03

## 2017-03-03 RX ORDER — ONDANSETRON 2 MG/ML
INJECTION INTRAMUSCULAR; INTRAVENOUS
Status: DISCONTINUED | OUTPATIENT
Start: 2017-03-03 | End: 2017-03-03

## 2017-03-03 RX ORDER — ROCURONIUM BROMIDE 10 MG/ML
INJECTION, SOLUTION INTRAVENOUS
Status: DISCONTINUED | OUTPATIENT
Start: 2017-03-03 | End: 2017-03-03

## 2017-03-03 RX ORDER — MIDAZOLAM HYDROCHLORIDE 1 MG/ML
INJECTION, SOLUTION INTRAMUSCULAR; INTRAVENOUS
Status: DISCONTINUED | OUTPATIENT
Start: 2017-03-03 | End: 2017-03-03

## 2017-03-03 RX ORDER — HYDROMORPHONE HYDROCHLORIDE 1 MG/ML
0.2 INJECTION, SOLUTION INTRAMUSCULAR; INTRAVENOUS; SUBCUTANEOUS EVERY 5 MIN PRN
Status: DISCONTINUED | OUTPATIENT
Start: 2017-03-03 | End: 2017-03-03 | Stop reason: HOSPADM

## 2017-03-03 RX ORDER — LIDOCAINE HCL/PF 100 MG/5ML
SYRINGE (ML) INTRAVENOUS
Status: DISCONTINUED | OUTPATIENT
Start: 2017-03-03 | End: 2017-03-03

## 2017-03-03 RX ORDER — CLINDAMYCIN PHOSPHATE 900 MG/50ML
INJECTION, SOLUTION INTRAVENOUS
Status: DISCONTINUED | OUTPATIENT
Start: 2017-03-03 | End: 2017-03-03

## 2017-03-03 RX ADMIN — Medication 3 ML: at 10:03

## 2017-03-03 RX ADMIN — SODIUM CHLORIDE: 0.9 INJECTION, SOLUTION INTRAVENOUS at 05:03

## 2017-03-03 RX ADMIN — PROPOFOL 200 MG: 10 INJECTION, EMULSION INTRAVENOUS at 10:03

## 2017-03-03 RX ADMIN — FENTANYL CITRATE 100 MCG: 50 INJECTION, SOLUTION INTRAMUSCULAR; INTRAVENOUS at 10:03

## 2017-03-03 RX ADMIN — MIDAZOLAM HYDROCHLORIDE 2 MG: 1 INJECTION, SOLUTION INTRAMUSCULAR; INTRAVENOUS at 10:03

## 2017-03-03 RX ADMIN — ROCURONIUM BROMIDE 40 MG: 10 INJECTION, SOLUTION INTRAVENOUS at 10:03

## 2017-03-03 RX ADMIN — GLYCOPYRROLATE 0.6 MG: 0.2 INJECTION, SOLUTION INTRAMUSCULAR; INTRAVENOUS at 11:03

## 2017-03-03 RX ADMIN — Medication 3 ML: at 05:03

## 2017-03-03 RX ADMIN — SODIUM CHLORIDE: 0.9 INJECTION, SOLUTION INTRAVENOUS at 10:03

## 2017-03-03 RX ADMIN — NEOSTIGMINE METHYLSULFATE 5 MG: 1 INJECTION INTRAVENOUS at 11:03

## 2017-03-03 RX ADMIN — SODIUM CHLORIDE, SODIUM GLUCONATE, SODIUM ACETATE, POTASSIUM CHLORIDE, MAGNESIUM CHLORIDE, SODIUM PHOSPHATE, DIBASIC, AND POTASSIUM PHOSPHATE: .53; .5; .37; .037; .03; .012; .00082 INJECTION, SOLUTION INTRAVENOUS at 10:03

## 2017-03-03 RX ADMIN — GLYCOPYRROLATE 0.2 MG: 0.2 INJECTION, SOLUTION INTRAMUSCULAR; INTRAVENOUS at 11:03

## 2017-03-03 RX ADMIN — ONDANSETRON 4 MG: 2 INJECTION INTRAMUSCULAR; INTRAVENOUS at 11:03

## 2017-03-03 RX ADMIN — FENTANYL CITRATE 50 MCG: 50 INJECTION, SOLUTION INTRAMUSCULAR; INTRAVENOUS at 11:03

## 2017-03-03 RX ADMIN — Medication: at 04:03

## 2017-03-03 RX ADMIN — FENTANYL CITRATE 25 MCG: 50 INJECTION, SOLUTION INTRAMUSCULAR; INTRAVENOUS at 11:03

## 2017-03-03 RX ADMIN — DIPHENHYDRAMINE HYDROCHLORIDE 25 MG: 50 INJECTION, SOLUTION INTRAMUSCULAR; INTRAVENOUS at 05:03

## 2017-03-03 RX ADMIN — LIDOCAINE HYDROCHLORIDE 60 MG: 20 INJECTION, SOLUTION INTRAVENOUS at 10:03

## 2017-03-03 RX ADMIN — Medication: at 12:03

## 2017-03-03 RX ADMIN — SODIUM CHLORIDE: 0.9 INJECTION, SOLUTION INTRAVENOUS at 12:03

## 2017-03-03 RX ADMIN — CLINDAMYCIN PHOSPHATE 900 MG: 18 INJECTION, SOLUTION INTRAVENOUS at 10:03

## 2017-03-03 RX ADMIN — SUCCINYLCHOLINE CHLORIDE 140 MG: 20 INJECTION, SOLUTION INTRAMUSCULAR; INTRAVENOUS at 10:03

## 2017-03-03 RX ADMIN — Medication 3 ML: at 02:03

## 2017-03-03 NOTE — NURSING TRANSFER
Nursing Transfer Note      3/3/2017     Transfer To: 545    Transfer via stretcher    Transfer with n/a    Transported by pct    Medicines sent: dilaudid pca    Chart send with patient: Yes    Notified: spouse

## 2017-03-03 NOTE — PROGRESS NOTES
Progress Note  General Surgery    Admit Date: 3/2/2017  Post-operative Day:    Hospital Day: 2    SUBJECTIVE:     Pt seen and examined, no acute events overnight, denies nausea/vomiting, SBFT done yesterday showing pSBO. Some gas and x2 loose BM.  cc last 24 hours.     Scheduled Meds:   enoxaparin  40 mg Subcutaneous Q24H    sodium chloride 0.9%  3 mL Intravenous Q8H     Continuous Infusions:   sodium chloride 0.9% 125 mL/hr at 03/03/17 0526     PRN Meds:diphenhydrAMINE, morphine, ondansetron, promethazine (PHENERGAN) IVPB    Review of patient's allergies indicates:   Allergen Reactions    Penicillins      OBJECTIVE:     Vital Signs (Most Recent)    Temp: 97.6 °F (36.4 °C) (03/03/17 0800)  Pulse: 100 (03/03/17 0800)  Resp: 16 (03/03/17 0800)  BP: (!) 141/69 (03/03/17 0800)  SpO2: 97 % (03/03/17 0459)    Vital Signs Range (Last 24H):    Temp:  [97 °F (36.1 °C)-98 °F (36.7 °C)]   Pulse:  []   Resp:  [15-17]   BP: (111-151)/(60-78)   SpO2:  [97 %-99 %]     I & O (Last 24H):    Intake/Output Summary (Last 24 hours) at 03/03/17 0911  Last data filed at 03/03/17 0459   Gross per 24 hour   Intake          3149.58 ml   Output              700 ml   Net          2449.58 ml     Physical Exam:    General: well developed, well nourished, no distress  Head: normocephalic, atraumatic  Lungs:  normal respiratory effort  Heart: regular rate and rhythm  Abdomen: soft, mild distension, minimal TTP, no hernias appreciated    Laboratory:    CBC:     Recent Labs  Lab 03/03/17 0422   WBC 5.12   RBC 5.01   HGB 15.0   HCT 44.7      MCV 89   MCH 29.9   MCHC 33.6     BMP:     Recent Labs  Lab 03/03/17  0422   GLU 98      K 4.1      CO2 26   BUN 23*   CREATININE 1.0   CALCIUM 9.2     CMP:     Recent Labs  Lab 03/03/17 0422   GLU 98   CALCIUM 9.2      K 4.1   CO2 26      BUN 23*   CREATININE 1.0     Labs within the past 24 hours have been reviewed.      ASSESSMENT/PLAN:     45 yo male w pSBO, no  previous surgical history    - Plan for OR today, ex lap, leonardo possible sbr.   - continue NPO, IVF, NGT  - consent obtained and patient agree with plans.  - pain/nausea meds PRN  - please call if any questions, thank you     Cee Howell MD  General Surgery PGY3  Beeper: [phone]  Cell: [phone]

## 2017-03-03 NOTE — ANESTHESIA POSTPROCEDURE EVALUATION
"Anesthesia Post Evaluation    Patient: Rafa Crowley    Procedure(s) Performed: Procedure(s) (LRB):  EXPLORATORY-LAPAROTOMY (N/A)  LYSIS-ADHESION (N/A)    Final Anesthesia Type: general  Patient location during evaluation: PACU  Patient participation: Yes- Able to Participate  Level of consciousness: awake and alert and oriented  Post-procedure vital signs: reviewed and stable  Pain management: adequate  Airway patency: patent  PONV status at discharge: No PONV  Anesthetic complications: no      Cardiovascular status: blood pressure returned to baseline and hemodynamically stable  Respiratory status: spontaneous ventilation, unassisted and room air  Hydration status: euvolemic  Follow-up not needed.        Visit Vitals    /67    Pulse 64    Temp 36.7 °C (98 °F) (Axillary)    Resp 14    Ht 5' 7" (1.702 m)    Wt 97.5 kg (215 lb)    SpO2 97%    BMI 33.67 kg/m2       Pain/Emelyn Score: Pain Assessment Performed: Yes (3/3/2017 12:00 PM)  Presence of Pain: complains of pain/discomfort (3/3/2017 12:00 PM)  Pain Rating Prior to Med Admin: 7 (3/3/2017 12:16 PM)  Emelyn Score: 8 (3/3/2017 12:00 PM)      "

## 2017-03-03 NOTE — OP NOTE
DATE OF PROCEDURE:  03/03/2017.    PREOPERATIVE DIAGNOSIS:  Small-bowel obstruction.    POSTOPERATIVE DIAGNOSIS:  Small-bowel obstruction.    PROCEDURES:  1.  Diagnostic laparoscopy.  2.  Exploratory laparotomy with lysis of adhesions for small-bowel obstruction.    SURGEON:  Rafa Arreguin M.D.    ASSISTANTS:  Daisy Mack M.D. (RES), and To Hastings M.D. (RES).    ANESTHESIA:  General.    CLINICAL NOTE:  The patient is a 46-year-old male, admitted with intestinal   obstruction, who has not progressed despite NG decompression.    PROCEDURE NOTE:  Following induction of adequate general anesthesia, the   patient's abdomen was prepped and draped with ChloraPrep.  We then made a   supraumbilical incision, dissected down to the linea alba and grasped it between   clamps, and then incised the fascia.  We entered the peritoneal cavity under   direct vision and placed a blunt-tipped trocar through this site and   insufflated, creating a pneumoperitoneum of 15 mmHg intraabdominal pressure.  We   then put two lower midline 5 mm trocars intraperitoneally under direct vision   and then explored the abdomen.  The patient had distended bowel and we were able   to demonstrate what appeared to be an area of adhesion and obstruction at the   distal ileum.  We could not completely resolve this issue laparoscopically and   so we elected to convert to exploratory laparotomy.  We opened the midline and   incised the linea alba and entered the peritoneum.  We explored the patient and   had essentially no adhesions except this area of kink and inflammatory response   approximately one foot from the ileocecal valve in the distal ileum.  We lysed   the adhesion and resolved the obstruction.  We found no other pathology and   elected to close.  We closed the linea alba with #1 PDS and staples were applied   to skin.  Sterile dressings were applied and the procedure was terminated with   the patient tolerating it  well.    ESTIMATED BLOOD LOSS:  30 mL.      MCT/HN  dd: 03/03/2017 14:24:36 (CST)  td: 03/03/2017 16:03:47 (CST)  Doc ID   #[id number]  Job ID #[id number]    CC:

## 2017-03-03 NOTE — PLAN OF CARE
Problem: Bowel Obstruction (Adult)  Goal: Signs and Symptoms of Listed Potential Problems Will be Absent, Minimized or Managed (Bowel Obstruction)  Signs and symptoms of listed potential problems will be absent, minimized or managed by discharge/transition of care (reference Bowel Obstruction (Adult) CPG).   Outcome: Ongoing (interventions implemented as appropriate)  Pt AAOX4, BP hypertensive. NGT to LIWS. Pt has been NPO and is aware of procedure planned for 1020 today. Family at bedside. Pt is resting quietly now.   No s/s infection, skin breakdown/pressure ulcers - pt moves independently well. Pt ambulates to restroom without difficulty. Pain/nausea managed with PRN medications. IS education complete. Ambulation promoted. Fall precautions maintained. Will resume care.

## 2017-03-03 NOTE — BRIEF OP NOTE
[facility]  Brief Operative Note    SUMMARY     Surgery Date: 3/3/2017     Surgeon(s) and Role:     * Daisy Mack MD - Resident - Assisting     * To Hastings MD - Resident - Assisting     * Rafa Arreguin MD - Primary        Pre-op Diagnosis:  Small bowel obstruction [K56.69]    Post-op Diagnosis:  Post-Op Diagnosis Codes:     * Small bowel obstruction [K56.69]    Procedure(s) (LRB):  EXPLORATORY-LAPAROTOMY (N/A)  LYSIS-ADHESION (N/A)    Anesthesia: General    Description of Procedure: see op note    Description of the findings of the procedure: single adhesion from TI to pelvic side wall lysed without issue; all bowel viable    Estimated Blood Loss: 50 mL         Specimens:   Specimen     None

## 2017-03-03 NOTE — TRANSFER OF CARE
"Anesthesia Transfer of Care Note    Patient: Rafa Crowley    Procedure(s) Performed: Procedure(s) (LRB):  EXPLORATORY-LAPAROTOMY (N/A)  LYSIS-ADHESION (N/A)    Patient location: PACU    Anesthesia Type: general    Transport from OR: Transported from OR on 6-10 L/min O2 by face mask with adequate spontaneous ventilation    Post pain: adequate analgesia    Post assessment: no apparent anesthetic complications    Post vital signs: stable    Level of consciousness: awake and alert    Nausea/Vomiting: no nausea/vomiting    Complications: none          Last vitals:   Visit Vitals    BP (!) 147/105    Pulse 65    Temp 36.7 °C (98 °F) (Axillary)    Resp 18    Ht 5' 7" (1.702 m)    Wt 97.5 kg (215 lb)    SpO2 99%    BMI 33.67 kg/m2     "

## 2017-03-03 NOTE — PLAN OF CARE
Problem: Patient Care Overview  Goal: Plan of Care Review  Outcome: Ongoing (interventions implemented as appropriate)  Plan of care reviewed and updated. Pt AA+O. Pt's pain is managed with the medication ordered at this time. Pt's VS are as charted.  No falls this shift. Pt is oriented to room and call system. Will continue to [facility].

## 2017-03-03 NOTE — ANESTHESIA RELEASE NOTE
"Anesthesia Release from PACU Note    Patient: Rafa Crowley    Procedure(s) Performed: Procedure(s) (LRB):  EXPLORATORY-LAPAROTOMY (N/A)  LYSIS-ADHESION (N/A)    Anesthesia type: general    Post pain: Adequate analgesia    Post assessment: no apparent anesthetic complications, tolerated procedure well and no evidence of recall    Last Vitals:   Visit Vitals    /67    Pulse 64    Temp 36.7 °C (98 °F) (Axillary)    Resp 14    Ht 5' 7" (1.702 m)    Wt 97.5 kg (215 lb)    SpO2 97%    BMI 33.67 kg/m2       Post vital signs: stable    Level of consciousness: awake, alert  and oriented    Nausea/Vomiting: no nausea/no vomiting    Complications: none    Airway Patency: patent    Respiratory: unassisted, spontaneous ventilation, room air    Cardiovascular: stable and blood pressure at baseline    Hydration: euvolemic  "

## 2017-03-04 LAB
ANION GAP SERPL CALC-SCNC: 8 MMOL/L
BASOPHILS # BLD AUTO: 0.01 K/UL
BASOPHILS NFR BLD: 0.2 %
BUN SERPL-MCNC: 19 MG/DL
CALCIUM SERPL-MCNC: 8.5 MG/DL
CHLORIDE SERPL-SCNC: 108 MMOL/L
CO2 SERPL-SCNC: 25 MMOL/L
CREAT SERPL-MCNC: 0.9 MG/DL
DIFFERENTIAL METHOD: ABNORMAL
EOSINOPHIL # BLD AUTO: 0 K/UL
EOSINOPHIL NFR BLD: 0.2 %
ERYTHROCYTE [DISTWIDTH] IN BLOOD BY AUTOMATED COUNT: 13.7 %
EST. GFR  (AFRICAN AMERICAN): >60 ML/MIN/1.73 M^2
EST. GFR  (NON AFRICAN AMERICAN): >60 ML/MIN/1.73 M^2
GLUCOSE SERPL-MCNC: 108 MG/DL
HCT VFR BLD AUTO: 42.1 %
HGB BLD-MCNC: 13.8 G/DL
LYMPHOCYTES # BLD AUTO: 0.5 K/UL
LYMPHOCYTES NFR BLD: 9.7 %
MAGNESIUM SERPL-MCNC: 2.2 MG/DL
MCH RBC QN AUTO: 29.7 PG
MCHC RBC AUTO-ENTMCNC: 32.8 %
MCV RBC AUTO: 91 FL
MONOCYTES # BLD AUTO: 0.9 K/UL
MONOCYTES NFR BLD: 18.2 %
NEUTROPHILS # BLD AUTO: 3.7 K/UL
NEUTROPHILS NFR BLD: 71.3 %
PHOSPHATE SERPL-MCNC: 3.1 MG/DL
PLATELET # BLD AUTO: 187 K/UL
PMV BLD AUTO: 10.1 FL
POTASSIUM SERPL-SCNC: 4.2 MMOL/L
RBC # BLD AUTO: 4.65 M/UL
SODIUM SERPL-SCNC: 141 MMOL/L
WBC # BLD AUTO: 5.16 K/UL

## 2017-03-04 PROCEDURE — 12000002 HC ACUTE/MED SURGE SEMI-PRIVATE ROOM

## 2017-03-04 PROCEDURE — 84100 ASSAY OF PHOSPHORUS: CPT

## 2017-03-04 PROCEDURE — 25000003 PHARM REV CODE 250: Performed by: SURGERY

## 2017-03-04 PROCEDURE — 63600175 PHARM REV CODE 636 W HCPCS: Performed by: SURGERY

## 2017-03-04 PROCEDURE — 36415 COLL VENOUS BLD VENIPUNCTURE: CPT

## 2017-03-04 PROCEDURE — 83735 ASSAY OF MAGNESIUM: CPT

## 2017-03-04 PROCEDURE — 80048 BASIC METABOLIC PNL TOTAL CA: CPT

## 2017-03-04 PROCEDURE — 85025 COMPLETE CBC W/AUTO DIFF WBC: CPT

## 2017-03-04 RX ADMIN — Medication 3 ML: at 06:03

## 2017-03-04 RX ADMIN — ENOXAPARIN SODIUM 40 MG: 100 INJECTION SUBCUTANEOUS at 06:03

## 2017-03-04 RX ADMIN — Medication: at 03:03

## 2017-03-04 NOTE — PROGRESS NOTES
Progress Note  General Surgery    Admit Date: 3/2/2017  Post-operative Day: 1 Day Post-Op  Hospital Day: 3    INTERVAL HISTORY:   Patient seen and examined at bedside. No acute events overnight. Afebrile. VSS. Taken to Operating Room for diagnostic laparoscopy converted to laparotomy and lysis of adhesions. Pain well controlled on PCA. No flatus, BM. Maintained NPO. NGT in place with 425 mL past 24h.      Scheduled Meds:   enoxaparin  40 mg Subcutaneous Q24H    sodium chloride 0.9%  3 mL Intravenous Q8H     Continuous Infusions:   sodium chloride 0.9% 125 mL/hr at 03/03/17 1213    hydromorphone in 0.9 % NaCl 6 mg/30 ml       PRN Meds:diphenhydrAMINE, naloxone, ondansetron, promethazine (PHENERGAN) IVPB    Review of patient's allergies indicates:   Allergen Reactions    Penicillins        OBJECTIVE:     Vital Signs (Most Recent)  Temp: 98.6 °F (37 °C) (03/04/17 0321)  Pulse: (!) 115 (03/04/17 0321)  Resp: 16 (03/04/17 0321)  BP: (!) 136/94 (03/04/17 0321)  SpO2: (!) 94 % (03/04/17 0321)    Vital Signs Range (Last 24H):  Temp:  [96.3 °F (35.7 °C)-98.8 °F (37.1 °C)]   Pulse:  []   Resp:  [12-18]   BP: (117-147)/()   SpO2:  [91 %-100 %]     I & O (Last 24H):    Intake/Output Summary (Last 24 hours) at 03/04/17 0803  Last data filed at 03/04/17 0600   Gross per 24 hour   Intake             3210 ml   Output             2225 ml   Net              985 ml       Physical Exam:  General: NAD  Neuro: AAOx3  Cardio: S1 and S2, RRR  Resp: Moving air appropriately, breathing even and unlabored  Abd: Soft, ND, appropriate surgical site tenderness, incision c/d/i and dressed, no signs of infection  Ext: Warm and well perfused      Laboratory:  CBC:     Recent Labs  Lab 03/04/17  0500   WBC 5.16   RBC 4.65   HGB 13.8*   HCT 42.1      MCV 91   MCH 29.7   MCHC 32.8     CMP:     Recent Labs  Lab 03/04/17  0500      CALCIUM 8.5*      K 4.2   CO2 25      BUN 19   CREATININE 0.9        ASSESSMENT/PLAN:   45 y/o male with no prior abdominal surgeries and SBO s/p ex lap, AC 1 Day Post-Op    SBO  - Diagnostic laparoscopy converted to laparotomy and lysis of adhesions (3/3/17)  - Continue NPO, mIVF  - NGT to remain in place to LIWS  - Possible NGT removal tomorrow  - Encourage OOB, ambulation  - Encourage IS as tolerated    Post-operative pain  - Continue current pain control regimen - Dilaudid PCA      To Hastings MD  Surgery Resident, PGY-II  Pager: [phone]  3/4/2017 8:03 AM

## 2017-03-04 NOTE — PLAN OF CARE
Problem: Patient Care Overview  Goal: Plan of Care Review  Outcome: Ongoing (interventions implemented as appropriate)  Pt in bed resting. C/o pain relieved with PCA pain med. In no acute distress. Activity promoted. ambulates in hallway. Regular diet tolerated well with no c/o n/v. No falls noted. Will continue to monitor. Call bell intact and in reach.

## 2017-03-04 NOTE — PLAN OF CARE
Problem: Patient Care Overview  Goal: Plan of Care Review  Outcome: Ongoing (interventions implemented as appropriate)  Plan of care reviewed with patient with no questions or concerns at this time. Pain was assessed and managed throughout shift. Patient voids without difficulty. Patient repositions self independently throughout shift. IV intact with continuous fluids infusing. PCA intact. SCD's in place. Fall precautions in place with bed alarm set and call light within reach. Sig-O at bedside. Will continue to monitor.

## 2017-03-04 NOTE — NURSING
Pt has been unable to void since before surgery. Bladder scan showed 898 cc of urine. Will notify MD

## 2017-03-05 LAB
ANION GAP SERPL CALC-SCNC: 9 MMOL/L
BASOPHILS # BLD AUTO: 0.04 K/UL
BASOPHILS NFR BLD: 0.7 %
BUN SERPL-MCNC: 12 MG/DL
CALCIUM SERPL-MCNC: 8.8 MG/DL
CHLORIDE SERPL-SCNC: 106 MMOL/L
CO2 SERPL-SCNC: 27 MMOL/L
CREAT SERPL-MCNC: 0.8 MG/DL
DIFFERENTIAL METHOD: ABNORMAL
E COLI SXT1 STL QL IA: NEGATIVE
E COLI SXT2 STL QL IA: NEGATIVE
EOSINOPHIL # BLD AUTO: 0 K/UL
EOSINOPHIL NFR BLD: 0.5 %
ERYTHROCYTE [DISTWIDTH] IN BLOOD BY AUTOMATED COUNT: 13.3 %
EST. GFR  (AFRICAN AMERICAN): >60 ML/MIN/1.73 M^2
EST. GFR  (NON AFRICAN AMERICAN): >60 ML/MIN/1.73 M^2
GLUCOSE SERPL-MCNC: 85 MG/DL
HCT VFR BLD AUTO: 39 %
HGB BLD-MCNC: 12.6 G/DL
LYMPHOCYTES # BLD AUTO: 1.1 K/UL
LYMPHOCYTES NFR BLD: 17.8 %
MAGNESIUM SERPL-MCNC: 2.1 MG/DL
MCH RBC QN AUTO: 29 PG
MCHC RBC AUTO-ENTMCNC: 32.3 %
MCV RBC AUTO: 90 FL
MONOCYTES # BLD AUTO: 1 K/UL
MONOCYTES NFR BLD: 16.1 %
NEUTROPHILS # BLD AUTO: 3.8 K/UL
NEUTROPHILS NFR BLD: 63.7 %
PHOSPHATE SERPL-MCNC: 2 MG/DL
PLATELET # BLD AUTO: 185 K/UL
PMV BLD AUTO: 9.8 FL
POTASSIUM SERPL-SCNC: 4 MMOL/L
RBC # BLD AUTO: 4.35 M/UL
SODIUM SERPL-SCNC: 142 MMOL/L
WBC # BLD AUTO: 5.95 K/UL

## 2017-03-05 PROCEDURE — 83735 ASSAY OF MAGNESIUM: CPT

## 2017-03-05 PROCEDURE — 25000003 PHARM REV CODE 250: Performed by: GENERAL PRACTICE

## 2017-03-05 PROCEDURE — 63600175 PHARM REV CODE 636 W HCPCS: Performed by: SURGERY

## 2017-03-05 PROCEDURE — 36415 COLL VENOUS BLD VENIPUNCTURE: CPT

## 2017-03-05 PROCEDURE — 84100 ASSAY OF PHOSPHORUS: CPT

## 2017-03-05 PROCEDURE — 85025 COMPLETE CBC W/AUTO DIFF WBC: CPT

## 2017-03-05 PROCEDURE — 12000002 HC ACUTE/MED SURGE SEMI-PRIVATE ROOM

## 2017-03-05 PROCEDURE — 80048 BASIC METABOLIC PNL TOTAL CA: CPT

## 2017-03-05 RX ORDER — CLINDAMYCIN PHOSPHATE 900 MG/50ML
900 INJECTION, SOLUTION INTRAVENOUS
Status: DISCONTINUED | OUTPATIENT
Start: 2017-03-05 | End: 2017-03-07

## 2017-03-05 RX ADMIN — CLINDAMYCIN IN 5 PERCENT DEXTROSE 900 MG: 18 INJECTION, SOLUTION INTRAVENOUS at 04:03

## 2017-03-05 RX ADMIN — CLINDAMYCIN IN 5 PERCENT DEXTROSE 900 MG: 18 INJECTION, SOLUTION INTRAVENOUS at 08:03

## 2017-03-05 RX ADMIN — CLINDAMYCIN IN 5 PERCENT DEXTROSE 900 MG: 18 INJECTION, SOLUTION INTRAVENOUS at 11:03

## 2017-03-05 RX ADMIN — ENOXAPARIN SODIUM 40 MG: 100 INJECTION SUBCUTANEOUS at 05:03

## 2017-03-05 NOTE — PLAN OF CARE
Problem: Patient Care Overview  Goal: Plan of Care Review  Outcome: Ongoing (interventions implemented as appropriate)  PT is AAOX3, no acute changes noted during shift  Pt is up ad jay and ambulates well w/o difficulties VSS. No skin breakdown noted  No falls noted. Fall precautions remain Pain assessed. Pain controlled with PCA pump. Pt resting comfortable in bed. Call light in reach. Will continue to monitor.

## 2017-03-05 NOTE — PROGRESS NOTES
Progress Note  General Surgery    Admit Date: 3/2/2017  Post-operative Day: 2 Days Post-Op  Hospital Day: 4    INTERVAL HISTORY:   Patient seen and examined at bedside. No acute events overnight. Afebrile. VSS. Taken to Operating Room for diagnostic laparoscopy converted to laparotomy and lysis of adhesions. Pain well controlled on PCA. No flatus, BM. Maintained NPO. NGT in place with 425 mL past 24h.      Scheduled Meds:   enoxaparin  40 mg Subcutaneous Q24H    sodium chloride 0.9%  3 mL Intravenous Q8H     Continuous Infusions:   sodium chloride 0.9% 125 mL/hr at 03/03/17 1213    hydromorphone in 0.9 % NaCl 6 mg/30 ml       PRN Meds:diphenhydrAMINE, naloxone, ondansetron, phenyleph-shark alla oil-mo-pet, promethazine (PHENERGAN) IVPB    Review of patient's allergies indicates:   Allergen Reactions    Penicillins        OBJECTIVE:     Vital Signs (Most Recent)  Temp: 97.6 °F (36.4 °C) (03/05/17 0400)  Pulse: 77 (03/05/17 0400)  Resp: 18 (03/05/17 0400)  BP: 137/68 (03/05/17 0400)  SpO2: 96 % (03/05/17 0400)    Vital Signs Range (Last 24H):  Temp:  [97 °F (36.1 °C)-99.3 °F (37.4 °C)]   Pulse:  [65-99]   Resp:  [16-18]   BP: (128-137)/(63-77)   SpO2:  [96 %-97 %]     I & O (Last 24H):    Intake/Output Summary (Last 24 hours) at 03/05/17 0750  Last data filed at 03/05/17 0600   Gross per 24 hour   Intake              885 ml   Output             2725 ml   Net            -1840 ml       Physical Exam:  General: NAD  Neuro: AAOx3  Cardio: S1 and S2, RRR  Resp: Moving air appropriately, breathing even and unlabored  Abd: Soft, ND, appropriate surgical site tenderness, incision c/d/i and dressed, slight erythema around incision no areas of fluctuance; hypoactive bs  Ext: Warm and well perfused      Laboratory:  CBC:     Recent Labs  Lab 03/05/17  0457   WBC 5.95   RBC 4.35*   HGB 12.6*   HCT 39.0*      MCV 90   MCH 29.0   MCHC 32.3     CMP:     Recent Labs  Lab 03/05/17  0457   GLU 85   CALCIUM 8.8      K  4.0   CO2 27      BUN 12   CREATININE 0.8       ASSESSMENT/PLAN:   45 y/o male with no prior abdominal surgeries and SBO s/p ex lap, AC 2 Days Post-Op    SBO  - Diagnostic laparoscopy converted to laparotomy and lysis of adhesions (3/3/17)  - Continue NPO, mIVF  - D/C NG today, sips / chips sparingly  - Encourage OOB, ambulation  - Encourage IS as tolerated    Incisional erythema  - Start clinda 900 IV q8h    Post-operative pain  - Continue current pain control regimen - Dilaudid PCA      Daisy Mack MD  General Surgery, PGY-V  [phone]

## 2017-03-05 NOTE — PLAN OF CARE
Problem: Patient Care Overview  Goal: Plan of Care Review  Outcome: Ongoing (interventions implemented as appropriate)  Patient in bed resting. Denies any pain. Minimal complaint of nausea. Physician advised patient to minimize ice chip intake and issue was resolved.  Stable vital signs with no signs of distress. Patient ambulates independently with no falls noted. Incision clean, dry and in tact. Call light in reach. Will continue to monitor.

## 2017-03-06 LAB
ANION GAP SERPL CALC-SCNC: 11 MMOL/L
ANISOCYTOSIS BLD QL SMEAR: SLIGHT
BACTERIA STL CULT: NORMAL
BASOPHILS # BLD AUTO: ABNORMAL K/UL
BASOPHILS NFR BLD: 0 %
BUN SERPL-MCNC: 12 MG/DL
CALCIUM SERPL-MCNC: 8.8 MG/DL
CHLORIDE SERPL-SCNC: 104 MMOL/L
CO2 SERPL-SCNC: 23 MMOL/L
CREAT SERPL-MCNC: 0.7 MG/DL
DIFFERENTIAL METHOD: ABNORMAL
EOSINOPHIL # BLD AUTO: ABNORMAL K/UL
EOSINOPHIL NFR BLD: 0 %
ERYTHROCYTE [DISTWIDTH] IN BLOOD BY AUTOMATED COUNT: 13.3 %
EST. GFR  (AFRICAN AMERICAN): >60 ML/MIN/1.73 M^2
EST. GFR  (NON AFRICAN AMERICAN): >60 ML/MIN/1.73 M^2
GLUCOSE SERPL-MCNC: 86 MG/DL
HCT VFR BLD AUTO: 40.3 %
HGB BLD-MCNC: 13.5 G/DL
HYPOCHROMIA BLD QL SMEAR: ABNORMAL
LYMPHOCYTES # BLD AUTO: ABNORMAL K/UL
LYMPHOCYTES NFR BLD: 4 %
MAGNESIUM SERPL-MCNC: 1.8 MG/DL
MCH RBC QN AUTO: 29.5 PG
MCHC RBC AUTO-ENTMCNC: 33.5 %
MCV RBC AUTO: 88 FL
MONOCYTES # BLD AUTO: ABNORMAL K/UL
MONOCYTES NFR BLD: 4 %
NEUTROPHILS NFR BLD: 92 %
OVALOCYTES BLD QL SMEAR: ABNORMAL
PHOSPHATE SERPL-MCNC: 2.9 MG/DL
PLATELET # BLD AUTO: 206 K/UL
PMV BLD AUTO: 10 FL
POIKILOCYTOSIS BLD QL SMEAR: SLIGHT
POLYCHROMASIA BLD QL SMEAR: ABNORMAL
POTASSIUM SERPL-SCNC: 3.4 MMOL/L
RBC # BLD AUTO: 4.57 M/UL
SODIUM SERPL-SCNC: 138 MMOL/L
WBC # BLD AUTO: 7.15 K/UL

## 2017-03-06 PROCEDURE — 25000003 PHARM REV CODE 250: Performed by: GENERAL PRACTICE

## 2017-03-06 PROCEDURE — 12000002 HC ACUTE/MED SURGE SEMI-PRIVATE ROOM

## 2017-03-06 PROCEDURE — 84100 ASSAY OF PHOSPHORUS: CPT

## 2017-03-06 PROCEDURE — C9113 INJ PANTOPRAZOLE SODIUM, VIA: HCPCS | Performed by: STUDENT IN AN ORGANIZED HEALTH CARE EDUCATION/TRAINING PROGRAM

## 2017-03-06 PROCEDURE — 25000003 PHARM REV CODE 250: Performed by: SURGERY

## 2017-03-06 PROCEDURE — 85027 COMPLETE CBC AUTOMATED: CPT

## 2017-03-06 PROCEDURE — 63600175 PHARM REV CODE 636 W HCPCS: Performed by: SURGERY

## 2017-03-06 PROCEDURE — 36415 COLL VENOUS BLD VENIPUNCTURE: CPT

## 2017-03-06 PROCEDURE — 83735 ASSAY OF MAGNESIUM: CPT

## 2017-03-06 PROCEDURE — 85007 BL SMEAR W/DIFF WBC COUNT: CPT

## 2017-03-06 PROCEDURE — 63600175 PHARM REV CODE 636 W HCPCS: Performed by: STUDENT IN AN ORGANIZED HEALTH CARE EDUCATION/TRAINING PROGRAM

## 2017-03-06 PROCEDURE — 63600175 PHARM REV CODE 636 W HCPCS: Performed by: PHYSICIAN ASSISTANT

## 2017-03-06 PROCEDURE — 80048 BASIC METABOLIC PNL TOTAL CA: CPT

## 2017-03-06 RX ORDER — POTASSIUM CHLORIDE 7.45 MG/ML
10 INJECTION INTRAVENOUS
Status: COMPLETED | OUTPATIENT
Start: 2017-03-06 | End: 2017-03-06

## 2017-03-06 RX ORDER — PANTOPRAZOLE SODIUM 40 MG/10ML
40 INJECTION, POWDER, LYOPHILIZED, FOR SOLUTION INTRAVENOUS DAILY
Status: DISCONTINUED | OUTPATIENT
Start: 2017-03-06 | End: 2017-03-07

## 2017-03-06 RX ORDER — BISACODYL 10 MG
10 SUPPOSITORY, RECTAL RECTAL DAILY
Status: DISCONTINUED | OUTPATIENT
Start: 2017-03-06 | End: 2017-03-07 | Stop reason: HOSPADM

## 2017-03-06 RX ORDER — OXYCODONE AND ACETAMINOPHEN 5; 325 MG/1; MG/1
2 TABLET ORAL EVERY 4 HOURS PRN
Status: DISCONTINUED | OUTPATIENT
Start: 2017-03-06 | End: 2017-03-07 | Stop reason: HOSPADM

## 2017-03-06 RX ORDER — PANTOPRAZOLE SODIUM 40 MG/10ML
40 INJECTION, POWDER, LYOPHILIZED, FOR SOLUTION INTRAVENOUS DAILY
Status: DISCONTINUED | OUTPATIENT
Start: 2017-03-07 | End: 2017-03-06

## 2017-03-06 RX ORDER — MAGNESIUM SULFATE HEPTAHYDRATE 40 MG/ML
2 INJECTION, SOLUTION INTRAVENOUS ONCE
Status: COMPLETED | OUTPATIENT
Start: 2017-03-06 | End: 2017-03-06

## 2017-03-06 RX ORDER — OXYCODONE AND ACETAMINOPHEN 5; 325 MG/1; MG/1
1 TABLET ORAL EVERY 4 HOURS PRN
Status: DISCONTINUED | OUTPATIENT
Start: 2017-03-06 | End: 2017-03-07 | Stop reason: HOSPADM

## 2017-03-06 RX ADMIN — POTASSIUM CHLORIDE 10 MEQ: 10 INJECTION, SOLUTION INTRAVENOUS at 09:03

## 2017-03-06 RX ADMIN — MAGNESIUM SULFATE IN WATER 2 G: 40 INJECTION, SOLUTION INTRAVENOUS at 09:03

## 2017-03-06 RX ADMIN — BISACODYL 10 MG: 10 SUPPOSITORY RECTAL at 09:03

## 2017-03-06 RX ADMIN — ENOXAPARIN SODIUM 40 MG: 100 INJECTION SUBCUTANEOUS at 04:03

## 2017-03-06 RX ADMIN — PANTOPRAZOLE SODIUM 40 MG: 40 INJECTION, POWDER, FOR SOLUTION INTRAVENOUS at 10:03

## 2017-03-06 RX ADMIN — Medication 3 ML: at 10:03

## 2017-03-06 RX ADMIN — POTASSIUM CHLORIDE 10 MEQ: 10 INJECTION, SOLUTION INTRAVENOUS at 11:03

## 2017-03-06 RX ADMIN — ONDANSETRON 4 MG: 2 INJECTION INTRAMUSCULAR; INTRAVENOUS at 06:03

## 2017-03-06 RX ADMIN — PROMETHAZINE HYDROCHLORIDE 12.5 MG: 25 INJECTION, SOLUTION INTRAMUSCULAR; INTRAVENOUS at 10:03

## 2017-03-06 RX ADMIN — CLINDAMYCIN IN 5 PERCENT DEXTROSE 900 MG: 18 INJECTION, SOLUTION INTRAVENOUS at 03:03

## 2017-03-06 RX ADMIN — POTASSIUM CHLORIDE 10 MEQ: 10 INJECTION, SOLUTION INTRAVENOUS at 12:03

## 2017-03-06 RX ADMIN — CLINDAMYCIN IN 5 PERCENT DEXTROSE 900 MG: 18 INJECTION, SOLUTION INTRAVENOUS at 08:03

## 2017-03-06 RX ADMIN — POTASSIUM CHLORIDE 10 MEQ: 10 INJECTION, SOLUTION INTRAVENOUS at 10:03

## 2017-03-06 NOTE — PLAN OF CARE
Problem: Patient Care Overview  Goal: Plan of Care Review  Outcome: Ongoing (interventions implemented as appropriate)  Plan of care reviewed with patient with no questions or concerns at this time. Pain was assessed and managed throughout shift. Vital signs assessed every 4 hours during shift. IV intact with continuous fluids infusing. SCD's in place. Bed alarm set with call light within reach. Will continue to monitor.

## 2017-03-06 NOTE — PLAN OF CARE
Visited patient;AAOX4. Significant other at BS. Not medically stable for discharge;POD # 3: IVF, IV Antibiotic x1, diet advanced to regular today. No needs determined. CM will continue to follow.        03/06/17 1310   Right Care Assessment   Can the patient answer the patient profile reliably? Yes, cognitively intact   How often would a person be available to care for the patient? Often   Describe the patient's ability to walk at the present time. No restrictions   How does the patient rate their overall health at the present time? Good   Number of comorbid conditions (as recorded on the chart) None   During the past month, has the patient often been bothered by feeling down, depressed or hopeless? No   During the past month, has the patient often been bothered by little interest or pleasure in doing things? No

## 2017-03-06 NOTE — PROGRESS NOTES
Progress Note  General Surgery    Admit Date: 3/2/2017  Post-operative Day: 3 Days Post-Op  Hospital Day: 5    INTERVAL HISTORY:   Patient seen and examined, no acute events overnight. Afebrile. VSS. No F/BM, but states he can feel rumbling. States he is not really using his PCA. Tolerating ice no N/V.      Scheduled Meds:   bisacodyl  10 mg Rectal Daily    clindamycin (CLEOCIN) IVPB  900 mg Intravenous Q8H    enoxaparin  40 mg Subcutaneous Q24H    sodium chloride 0.9%  3 mL Intravenous Q8H     Continuous Infusions:   sodium chloride 0.9% 125 mL/hr at 03/03/17 1213     PRN Meds:diphenhydrAMINE, ondansetron, oxycodone-acetaminophen, oxycodone-acetaminophen, phenyleph-shark alla oil-mo-pet, promethazine (PHENERGAN) IVPB    Review of patient's allergies indicates:   Allergen Reactions    Penicillins        OBJECTIVE:     Vital Signs (Most Recent)  Temp: 98.7 °F (37.1 °C) (03/06/17 0324)  Pulse: 71 (03/06/17 0324)  Resp: 16 (03/06/17 0324)  BP: (!) 150/79 (03/06/17 0324)  SpO2: 97 % (03/06/17 0324)    Vital Signs Range (Last 24H):  Temp:  [97.9 °F (36.6 °C)-99 °F (37.2 °C)]   Pulse:  [70-75]   Resp:  [16-20]   BP: (132-150)/(68-79)   SpO2:  [97 %-99 %]     I & O (Last 24H):    Intake/Output Summary (Last 24 hours) at 03/06/17 0719  Last data filed at 03/06/17 0324   Gross per 24 hour   Intake             1500 ml   Output             1650 ml   Net             -150 ml       Physical Exam:  General: NAD  Neuro: AAOx3  Cardio: S1 and S2, RRR  Resp: Moving air appropriately, breathing even and unlabored  Abd: Soft, ND, appropriate surgical site tenderness, incision c/d/i and dressed, erythema improved, no areas of fluctuance  Ext: Warm and well perfused      Laboratory:  CBC:     Recent Labs  Lab 03/05/17  0457   WBC 5.95   RBC 4.35*   HGB 12.6*   HCT 39.0*      MCV 90   MCH 29.0   MCHC 32.3     CMP:     Recent Labs  Lab 03/06/17  0558   GLU 86   CALCIUM 8.8      K 3.4*   CO2 23      BUN 12    CREATININE 0.7       ASSESSMENT/PLAN:   47 y/o male with no prior abdominal surgeries and SBO now s/p ex lap, AC 3 Days Post-Op    SBO s/p ex lap  - Advance to clear liquid diet, continue mIVF   - bowel regimen; suppository  - Encourage OOB, ambulation;PT/OT  - Encourage IS as tolerated    Incisional erythema  - Continue clinda 900 IV q8h    Post-operative pain  - d/c Dilaudid PCA; transition to PO pain meds    Post-operative nausea  - zofran/pnenergan PRN    Hypokalemia, hypomagnesemia  - replace      Shanna Allen PA-C  [id number]

## 2017-03-06 NOTE — PLAN OF CARE
Problem: Patient Care Overview  Goal: Plan of Care Review  Outcome: Ongoing (interventions implemented as appropriate)  Pt is AAOx4. VSS. No falls/injury as pt calls for assistance when needed. Pt able to ambulate independently and has walked in halls. No s/s of skin breakdown as pt is independent with re-positioning self. No complaints of pain this shift. IV antibiotic given as scheduled. Bed in lowest position. Call light within reach. Will continue to monitor.

## 2017-03-06 NOTE — PLAN OF CARE
Problem: Patient Care Overview  Goal: Plan of Care Review  Outcome: Ongoing (interventions implemented as appropriate)  Pt is AAOx4. VSS. No falls/injury as pt calls for assistance when needed. Pt able to ambulate independently and has walked in hallways today. No s/s of skin breakdown as pt is independent with re-positioning self. No complaints of pain- PRN meds available if needed. IV antibiotic given as scheduled. IV lytes replaced today. No complaints of nausea as of yet. Pt tolerating CL diet. Bed in lowest position. Call light within reach. Will continue to monitor.

## 2017-03-07 VITALS
HEART RATE: 73 BPM | BODY MASS INDEX: 33.74 KG/M2 | TEMPERATURE: 98 F | RESPIRATION RATE: 16 BRPM | DIASTOLIC BLOOD PRESSURE: 88 MMHG | SYSTOLIC BLOOD PRESSURE: 140 MMHG | WEIGHT: 215 LBS | OXYGEN SATURATION: 98 % | HEIGHT: 67 IN

## 2017-03-07 PROBLEM — K56.609 SMALL BOWEL OBSTRUCTION: Status: RESOLVED | Noted: 2017-03-02 | Resolved: 2017-03-07

## 2017-03-07 LAB
ANION GAP SERPL CALC-SCNC: 9 MMOL/L
ANISOCYTOSIS BLD QL SMEAR: SLIGHT
BASOPHILS # BLD AUTO: ABNORMAL K/UL
BASOPHILS NFR BLD: 0 %
BUN SERPL-MCNC: 9 MG/DL
CALCIUM SERPL-MCNC: 8.6 MG/DL
CHLORIDE SERPL-SCNC: 102 MMOL/L
CO2 SERPL-SCNC: 25 MMOL/L
CREAT SERPL-MCNC: 0.7 MG/DL
DIFFERENTIAL METHOD: ABNORMAL
EOSINOPHIL # BLD AUTO: ABNORMAL K/UL
EOSINOPHIL NFR BLD: 0 %
ERYTHROCYTE [DISTWIDTH] IN BLOOD BY AUTOMATED COUNT: 13.2 %
EST. GFR  (AFRICAN AMERICAN): >60 ML/MIN/1.73 M^2
EST. GFR  (NON AFRICAN AMERICAN): >60 ML/MIN/1.73 M^2
GLUCOSE SERPL-MCNC: 110 MG/DL
HCT VFR BLD AUTO: 40.5 %
HGB BLD-MCNC: 14 G/DL
HYPOCHROMIA BLD QL SMEAR: ABNORMAL
LYMPHOCYTES # BLD AUTO: ABNORMAL K/UL
LYMPHOCYTES NFR BLD: 10 %
MAGNESIUM SERPL-MCNC: 1.9 MG/DL
MCH RBC QN AUTO: 29.6 PG
MCHC RBC AUTO-ENTMCNC: 34.6 %
MCV RBC AUTO: 86 FL
MONOCYTES # BLD AUTO: ABNORMAL K/UL
MONOCYTES NFR BLD: 7 %
NEUTROPHILS NFR BLD: 83 %
OVALOCYTES BLD QL SMEAR: ABNORMAL
PHOSPHATE SERPL-MCNC: 3.3 MG/DL
PLATELET # BLD AUTO: 233 K/UL
PMV BLD AUTO: 10.1 FL
POIKILOCYTOSIS BLD QL SMEAR: SLIGHT
POLYCHROMASIA BLD QL SMEAR: ABNORMAL
POTASSIUM SERPL-SCNC: 3.7 MMOL/L
RBC # BLD AUTO: 4.73 M/UL
SODIUM SERPL-SCNC: 136 MMOL/L
WBC # BLD AUTO: 6.73 K/UL

## 2017-03-07 PROCEDURE — G8978 MOBILITY CURRENT STATUS: HCPCS | Mod: CI

## 2017-03-07 PROCEDURE — C9113 INJ PANTOPRAZOLE SODIUM, VIA: HCPCS

## 2017-03-07 PROCEDURE — 85007 BL SMEAR W/DIFF WBC COUNT: CPT

## 2017-03-07 PROCEDURE — 84100 ASSAY OF PHOSPHORUS: CPT

## 2017-03-07 PROCEDURE — 25000003 PHARM REV CODE 250: Performed by: GENERAL PRACTICE

## 2017-03-07 PROCEDURE — 85027 COMPLETE CBC AUTOMATED: CPT

## 2017-03-07 PROCEDURE — 83735 ASSAY OF MAGNESIUM: CPT

## 2017-03-07 PROCEDURE — 97165 OT EVAL LOW COMPLEX 30 MIN: CPT

## 2017-03-07 PROCEDURE — 25000003 PHARM REV CODE 250

## 2017-03-07 PROCEDURE — 36415 COLL VENOUS BLD VENIPUNCTURE: CPT

## 2017-03-07 PROCEDURE — G8980 MOBILITY D/C STATUS: HCPCS | Mod: CI

## 2017-03-07 PROCEDURE — 63600175 PHARM REV CODE 636 W HCPCS: Performed by: SURGERY

## 2017-03-07 PROCEDURE — 97161 PT EVAL LOW COMPLEX 20 MIN: CPT

## 2017-03-07 PROCEDURE — 63600175 PHARM REV CODE 636 W HCPCS

## 2017-03-07 PROCEDURE — 25000003 PHARM REV CODE 250: Performed by: SURGERY

## 2017-03-07 PROCEDURE — G8979 MOBILITY GOAL STATUS: HCPCS | Mod: CI

## 2017-03-07 PROCEDURE — 80048 BASIC METABOLIC PNL TOTAL CA: CPT

## 2017-03-07 RX ORDER — POLYETHYLENE GLYCOL 3350 17 G/17G
17 POWDER, FOR SOLUTION ORAL DAILY
Qty: 289 G | Refills: 0 | Status: SHIPPED | OUTPATIENT
Start: 2017-03-07 | End: 2017-03-15

## 2017-03-07 RX ORDER — OXYCODONE AND ACETAMINOPHEN 5; 325 MG/1; MG/1
1 TABLET ORAL EVERY 4 HOURS PRN
Qty: 51 TABLET | Refills: 0 | Status: SHIPPED | OUTPATIENT
Start: 2017-03-07 | End: 2017-03-17

## 2017-03-07 RX ORDER — DOCUSATE SODIUM 100 MG/1
100 CAPSULE, LIQUID FILLED ORAL 2 TIMES DAILY PRN
Refills: 0 | COMMUNITY
Start: 2017-03-07 | End: 2017-12-13

## 2017-03-07 RX ORDER — MAGNESIUM SULFATE HEPTAHYDRATE 40 MG/ML
2 INJECTION, SOLUTION INTRAVENOUS ONCE
Status: COMPLETED | OUTPATIENT
Start: 2017-03-07 | End: 2017-03-07

## 2017-03-07 RX ORDER — POTASSIUM CHLORIDE 20 MEQ/1
60 TABLET, EXTENDED RELEASE ORAL ONCE
Status: COMPLETED | OUTPATIENT
Start: 2017-03-07 | End: 2017-03-07

## 2017-03-07 RX ORDER — PANTOPRAZOLE SODIUM 40 MG/10ML
40 INJECTION, POWDER, LYOPHILIZED, FOR SOLUTION INTRAVENOUS ONCE
Status: COMPLETED | OUTPATIENT
Start: 2017-03-07 | End: 2017-03-07

## 2017-03-07 RX ADMIN — POTASSIUM CHLORIDE 60 MEQ: 1500 TABLET, EXTENDED RELEASE ORAL at 08:03

## 2017-03-07 RX ADMIN — CLINDAMYCIN IN 5 PERCENT DEXTROSE 900 MG: 18 INJECTION, SOLUTION INTRAVENOUS at 12:03

## 2017-03-07 RX ADMIN — ENOXAPARIN SODIUM 40 MG: 100 INJECTION SUBCUTANEOUS at 05:03

## 2017-03-07 RX ADMIN — Medication 3 ML: at 05:03

## 2017-03-07 RX ADMIN — MAGNESIUM SULFATE IN WATER 2 G: 40 INJECTION, SOLUTION INTRAVENOUS at 08:03

## 2017-03-07 RX ADMIN — PANTOPRAZOLE SODIUM 40 MG: 40 INJECTION, POWDER, FOR SOLUTION INTRAVENOUS at 08:03

## 2017-03-07 NOTE — PT/OT/SLP EVAL
Physical Therapy  Evaluation/Discharge Summary    Rafa Crowley   MRN: [id number]   Admitting Diagnosis: Small bowel obstruction    PT Received On: 03/07/17  PT Start Time: 1012     PT Stop Time: 1024    PT Total Time (min): 12 min       Billable Minutes:  Evaluation 12    Diagnosis: Small bowel obstruction  EXPLORATORY-LAPAROTOMY (N/A)  LYSIS-ADHESION (N/A)        History reviewed. No pertinent past medical history.   Past Surgical History:   Procedure Laterality Date    knee scope         Referring physician: Shanna Allen PA-C  Date referred to PT: 3/6/17    General Precautions: Standard,  (up ad jay)  Orthopedic Precautions: N/A   Braces: N/A       Do you have any cultural, spiritual, Latter-day conflicts, given your current situation?: no    Patient History:  Lives With: significant other  Living Environment Comment: Pt lives with wife in a H no [place]UNC[place]. PTA, pt was (I) with mobility and ADLs including driving and working full-time. Owns no DME. Pt's job requires heavy lifting. Pt's wife able to assist as needed upon d/c.   Equipment Currently Used at Home: none      Previous Level of Function:  Ambulation Skills: independent  Transfer Skills: independent  ADL Skills: independent  Work/Leisure Activity: independent (freight ; heavy lifting involved)    Subjective:  Communicated with RN prior to session.  Pt agreeable to PT/OT evaluation. Wife present at bedside throughout.     Chief Complaint: Pain/discomfort from hemorrhoids   Patient goals: To go home and return to [facility]    Did not rate Pain/discomfort.       Objective:   Patient found with: peripheral IV, telemetry     Cognitive Exam:  Oriented to: Person, Place, Time and Situation    Follows Commands/attention: Follows multistep  commands  Communication: clear/fluent  Safety awareness/insight to disability: intact    Physical Exam:  Postural examination/scapula alignment: No postural abnormalities identified    Skin integrity: Visible skin  intact  Edema: None noted     Sensation:   Intact    Upper Extremity Range of Motion:  Right Upper Extremity: WFL  Left Upper Extremity: WFL    Upper Extremity Strength:  Right Upper Extremity: WFL  Left Upper Extremity: WFL    Lower Extremity Range of Motion:  Right Lower Extremity: WFL  Left Lower Extremity: WFL    Lower Extremity Strength:  Right Lower Extremity: WFL  Left Lower Extremity: WFL     Fine motor coordination:  Intact    Gross motor coordination: WFL    Functional Mobility:  Bed Mobility:  Rolling/Turning to Left: Supervision (v/c for log roll)  Supine to Sit: Supervision (HOB flat)  Sit to Supine: Supervision (HOB flat)    Transfers:  Sit <> Stand Assistance: Independent  Sit <> Stand Assistive Device: No Assistive Device    Gait:   Gait Distance: Pt ambulated ~150 feet using no AD and Supervision only. Demonstrated swing through gait with equal step length and no trunk sway or LOB noted.         Balance:   Static Sit: GOOD: Takes MODERATE challenges from all directions  Dynamic Sit: GOOD: Maintains balance through MODERATE excursions of active trunk movement  Static Stand: GOOD: Takes MODERATE challenges from all directions  Dynamic stand: GOOD: Needs SUPERVISION only during gait and able to self right with moderate       Education:  Education provided to pt and spouse regarding: log-roll to decrease abdominal pain/discomfort with bed mobility; safety with mobility; daily walking to increase overall endurance and tolerance to mobility . Verbalized understanding.     Whiteboard updated with correct mobility information. RN/PCT notified.  Pt ok to walk in hallway with wife/family. RW only for pt's comfort.       AM-PAC 6 CLICK MOBILITY  How much help from another person does this patient currently need?   1 = Unable, Total/Dependent Assistance  2 = A lot, Maximum/Moderate Assistance  3 = A little, Minimum/Contact Guard/Supervision  4 = None, Modified [place]/Independent    Turning over in bed  (including adjusting bedclothes, sheets and blankets)?: 4  Sitting down on and standing up from a chair with arms (e.g., wheelchair, bedside commode, etc.): 4  Moving from lying on back to sitting on the side of the bed?: 4  Moving to and from a bed to a chair (including a wheelchair)?: 4  Need to walk in hospital room?: 4  Climbing 3-5 steps with a railing?: 3  Total Score: 23     AM-PAC Raw Score CMS G-Code Modifier Level of Impairment Assistance   6 % Total / Unable   7 - 9 CM 80 - 100% Maximal Assist   10 - 14 CL 60 - 80% Moderate Assist   15 - 19 CK 40 - 60% Moderate Assist   20 - 22 CJ 20 - 40% Minimal Assist   23 CI 1-20% SBA / CGA   24 CH 0% Independent/ Mod I     Patient left sitting EOB with all lines intact and call button in reach.    Assessment:   Rafa Crowley is a 47 y.o. male with a medical diagnosis of Small bowel obstruction and presents with decrease endurance and pain/discomfort with mobility.  Pt tolerated session well with no increase in pain or discomfort. Pt demonstrated understanding for log-roll technique.  Pt is (I)/mod (I) with all mobility and demonstrates good safety awareness. Pt does not demonstrate a need for acute PT services. At this time, pt will be d/c from acute PT. Please re-consult should pt's functional status change. Recommend d/c to home with wife's assist as needed.  Advised pt to contact MD in regards to any lifting restrictions at the time he returns to work; and should he see any lasting deficits to seek an OP PT referral from his PCP to address proper body mechanics and maintaining a healthy back with heavy lifting. Pt and wife verbalized understanding. All questions/concerns addressed/answered within PT's scope of practice.       Rehab identified problem list/impairments: Rehab identified problem list/impairments: pain, impaired endurance    Rehab potential is good.    Activity tolerance: Good    Discharge recommendations: Discharge Facility/Level Of Care  Needs: home     Barriers to discharge: Barriers to Discharge: None    Equipment recommendations: Equipment Needed After Discharge: none     GOALS:   Physical Therapy Goals     Not on file      Multidisciplinary Problems (Resolved)        Problem: Physical Therapy Goal    Goal Priority Disciplines Outcome Goal Variances Interventions   Physical Therapy Goal   (Resolved)     PT/OT, PT Outcome(s) achieved     Description:      Pt at baseline mobility, no LOB noted. No acute goals needed at this time.               PLAN:  D/c acute PT services at this time. Pt is supervision/mod (I) with all mobility.     Functional Assessment Tool Used: ampac  Score: 23  Functional Limitation: Mobility: Walking and moving around  Mobility: Walking and Moving Around Current Status (): CI  Mobility: Walking and Moving Around Goal Status (): CI  Mobility: Walking and Moving Around Discharge Status (): CI     Renetta Zuniga, PT  03/07/2017

## 2017-03-07 NOTE — PLAN OF CARE
Problem: Occupational Therapy Goal  Goal: Occupational Therapy Goal  Outcome: Outcome(s) achieved Date Met:  03/07/17     Pt does not require OT services in the acute care setting at this time.  Pt to d/c from OT with no further therapy needs recommended upon d/c from acute care.     DIYA Gerard  3/7/2017

## 2017-03-07 NOTE — PROGRESS NOTES
Pt was assisted ambulating in the hallway. Pt walked the length of 2 hallways then returned to their bed, remaining free from falls or other injuries. Pt tolerated activity well. Will continue to monitor.

## 2017-03-07 NOTE — PT/OT/SLP EVAL
Occupational Therapy  Evaluation/Discharge Summary    Rafa Crowley   MRN: [id number]   Admitting Diagnosis: Small bowel obstruction    OT Date of Treatment: 03/07/17   OT Start Time: 1012  OT Stop Time: 1024  OT Total Time (min): 12 min    Billable Minutes:  Evaluation 12  *completed with PT  Diagnosis: Small bowel obstruction       History reviewed. No pertinent past medical history.   Past Surgical History:   Procedure Laterality Date    knee scope         Referring physician: Rafa Arreguin MD  Date referred to OT: 3/6/2017    General Precautions: Standard,  (N/A)  Orthopedic Precautions: N/A  Braces: N/A    Do you have any cultural, spiritual, Sikhism conflicts, given your current situation?: None reported     Patient History:  Living Environment  Lives With: significant other  Transportation Available: car, family or friend will provide  Living Environment Comment: Pt lives with wife in [address], [address]; bathroom contains tub/shower combination with no bench or seat present.  PTA pt reports being (I) with all ADLs and mobility, and was working full time as a /delivery personnel  Equipment Currently Used at Home: none    Prior level of function:   Bed Mobility/Transfers: independent  Grooming: independent  Bathing: independent  Upper Body Dressing: independent  Lower Body Dressing: independent  Toileting: independent  Home Management Skills: independent  Driving License: Yes  Mode of Transportation: Car  Occupation: Full time employment (/delivery; involves heavy lifting/pushing)     Dominant hand: right    Subjective:  Communicated with RN prior to session.    Chief Complaint: pain with hemorrhoids  Patient/Family stated goals: return home    Pain Rating:  (pt reported his hemorrhoids were causing him pain, but did not rate)  Pain Rating Post-Intervention: 0/10    Objective:  Patient found with:  In bathroom with wife outside of room.  Upon second attempt pt found ambulating in  hallway with spouse and IV poll.    Cognitive Exam:  Oriented to: Person, Place, Time and Situation  Follows Commands/attention: Follows multistep  commands  Communication: clear/fluent  Memory:  No Deficits noted  Safety awareness/insight to disability: intact  Coping skills/emotional control: Appropriate to situation    Visual/perceptual:  Intact    Physical Exam:  Postural examination/scapula alignment: No postural abnormalities identified  Skin integrity: Visible skin intact  Edema: None noted     Sensation:   Intact    Upper Extremity Range of Motion:  Right Upper Extremity: WNL  Left Upper Extremity: WNL    Upper Extremity Strength:  Right Upper Extremity: WNL  Left Upper Extremity: WNL   Strength: 5/5 both hands    Fine motor coordination:   Intact    Gross motor coordination: WFL    Functional Mobility:  Bed Mobility:  Rolling/Turning to Left: Independent  Rolling/Turning Right: Independent  Scooting/Bridging: Independent  Supine to Sit: Independent  Sit to Supine: Independent    Transfers:  Sit <> Stand Assistance: Independent  Sit <> Stand Assistive Device: No Assistive Device    Functional Ambulation: Pt walked 150 ft independently; no instances of LOB or postural sway noted.      Activities of Daily Living:     LE Dressing Level of Assistance: Independent for donning socks and slip on shoes while seated EOB.     Balance:   Static Sit: NORMAL: No deviations seen in posture held statically  Dynamic Sit: NORMAL: No deviations seen in posture held dynamically  Static Stand: GOOD+: Takes MAXIMAL challenges from all directions  Dynamic stand: GOOD+: Independent gait (with or without assistive device)    Therapeutic Activities and Exercises:  *Pt educated on role of OT/PT and log rolling technique to facilitate getting into and out of bed.  Therapists discussed possibility of consulting MD if pt felt that he needed additional assistance prior to returning to work.  Wife and pt verbalized understanding.  *OT  "discussed possible adaptive equipment to utilize during LE dressing to prevent pain in abdomen.  *Whiteboard updated    AM-PAC 6 CLICK ADL  How much help from another person does this patient currently need?  1 = Unable, Total/Dependent Assistance  2 = A lot, Maximum/Moderate Assistance  3 = A little, Minimum/Contact Guard/Supervision  4 = None, Modified [place]/Independent    Putting on and taking off regular lower body clothing? : 4  Bathing (including washing, rinsing, drying)?: 4  Toileting, which includes using toilet, bedpan, or urinal? : 4  Putting on and taking off regular upper body clothing?: 4  Taking care of personal grooming such as brushing teeth?: 4  Eating meals?: 4  Total Score: 24    AM-PAC Raw Score CMS "G-Code Modifier Level of Impairment Assistance   6 % Total / Unable   7 - 9 CM 80 - 100% Maximal Assist   10 - 14 CL 60 - 80% Moderate Assist   15 - 19 CK 40 - 60% Moderate Assist   20 - 22 CJ 20 - 40% Minimal Assist   23 CI 1-20% SBA / CGA   24 CH 0% Independent/ Mod I       Patient left sitting EOB with all lines intact, call button in reach and wife present    Assessment:  Rafa Crowley is a 47 y.o. male with a medical diagnosis of Small bowel obstruction and presents with some pain from hemorrhoids.  Pt demonstrates strength and ROM in (B) UE needed for ADLs that is WNL, and is able to ambulate independently without LOB.  PTA pt reports being (I) with all ADLs or mobility.  Pt does not require OT services in the acute care setting at this time, and is safe to return home.  No further therapy services recommended upon d/c from acute care.    Rehab identified problem list/impairments: Rehab identified problem list/impairments:  (no major deficits noted)    Rehab potential is good.    Activity tolerance: Good    Discharge recommendations:       Barriers to discharge: Barriers to Discharge: None    Equipment recommendations: none     GOALS:   Occupational Therapy Goals     Not on " file      Multidisciplinary Problems (Resolved)        Problem: Occupational Therapy Goal    Goal Priority Disciplines Outcome Interventions   Occupational Therapy Goal   (Resolved)     OT, PT/OT Outcome(s) achieved              PLAN:  Patient to d/c from OT.  No further therapy services recommended upon d/c.  Plan of Care reviewed with: patient, spouse       DIYA Gerard  03/07/2017

## 2017-03-07 NOTE — PROGRESS NOTES
Progress Note  General Surgery    Admit Date: 3/2/2017  Post-operative Day: 4 Days Post-Op  Hospital Day: 6    INTERVAL HISTORY:   Patient seen and examined, no acute events overnight. Afebrile. VSS.   + flatus and bowel movement with suppository  Reports not taking PO pain meds  Tolerating PO with minimal nausea    Scheduled Meds:   bisacodyl  10 mg Rectal Daily    enoxaparin  40 mg Subcutaneous Q24H    magnesium sulfate IVPB  2 g Intravenous Once    pantoprazole  40 mg Intravenous Daily    potassium chloride  60 mEq Oral Once    sodium chloride 0.9%  3 mL Intravenous Q8H     Continuous Infusions:     PRN Meds:diphenhydrAMINE, ondansetron, oxycodone-acetaminophen, oxycodone-acetaminophen, phenyleph-shark alla oil-mo-pet, promethazine (PHENERGAN) IVPB    Review of patient's allergies indicates:   Allergen Reactions    Penicillins        OBJECTIVE:     Vital Signs (Most Recent)  Temp: 98.1 °F (36.7 °C) (03/07/17 0415)  Pulse: 74 (03/07/17 0415)  Resp: 16 (03/07/17 0415)  BP: 129/79 (03/07/17 0415)  SpO2: 99 % (03/07/17 0415)    Vital Signs Range (Last 24H):  Temp:  [97.8 °F (36.6 °C)-98.7 °F (37.1 °C)]   Pulse:  [64-82]   Resp:  [16-18]   BP: (129-156)/(76-87)   SpO2:  [96 %-99 %]     I & O (Last 24H):    Intake/Output Summary (Last 24 hours) at 03/07/17 0714  Last data filed at 03/06/17 2213   Gross per 24 hour   Intake             2450 ml   Output                0 ml   Net             2450 ml       Physical Exam:  General: NAD  Neuro: AAOx3  Cardio: S1 and S2, RRR  Resp: Moving air appropriately, breathing even and unlabored  Abd: Soft, ND, appropriate surgical site tenderness, incision c/d/i and dressed, erythema markedly improved, no areas of fluctuance  Ext: Warm and well perfused      Laboratory:  CBC:     Recent Labs  Lab 03/07/17  0422   WBC 6.73   RBC 4.73   HGB 14.0   HCT 40.5      MCV 86   MCH 29.6   MCHC 34.6     CMP:     Recent Labs  Lab 03/07/17  0422      CALCIUM 8.6*      K  3.7   CO2 25      BUN 9   CREATININE 0.7       ASSESSMENT/PLAN:   47 y/o male with no prior abdominal surgeries and SBO now s/p ex lap, AC 4 Days Post-Op    SBO s/p ex lap  - Regular diet, boost, Hep lock  - bowel regimen; suppository  - Encourage OOB, ambulation;PT/OT  - Encourage IS as tolerated    Incisional erythema - resolved  - discontinue clinda     Post-operative pain  - PO pain meds    Post-operative nausea  - zofran/pnenergan PRN    Hypokalemia, hypomagnesemia  - replaced    Dispo: home this PM vs tomorrow, pending clinical course

## 2017-03-07 NOTE — PROGRESS NOTES
Pt ready for discharge. Discharge instructions and prescriptions given and explained to pt. Pt verbalizes understanding. PIV removed. No further questions from pt at this time. Pt to be discharged via wheelchair. Will cont to monitor until discharge.

## 2017-03-07 NOTE — PLAN OF CARE
Problem: Patient Care Overview  Goal: Plan of Care Review  Outcome: Ongoing (interventions implemented as appropriate)  Pt is AAOx4. VSS. No falls/injury as pt calls for assistance when needed. Pt able to ambulate independently. No s/s of skin breakdown as pt is independent with re-positioning self. No complaints of pain this shift, PRN med available if needed. Incision site clean and dry with staples intact. No complaints of nausea this shift. Bed in lowest position. Call light within reach. Will continue to monitor.

## 2017-03-07 NOTE — PLAN OF CARE
Problem: Physical Therapy Goal  Goal: Physical Therapy Goal      Pt at baseline mobility, no LOB noted. No acute goals needed at this time.   Outcome: Outcome(s) achieved Date Met:  03/07/17           PT evaluation completed. Pt is mod (I)/(I) with all mobility and transfers. Pt does not demonstrate a need for skilled acute PT services. Eval/DC note to follow.      Renetta Zuniga, PT, DPT  03/07/2017

## 2017-03-07 NOTE — DISCHARGE SUMMARY
[facility]  Discharge Summary  General Surgery      Admit Date: 3/2/2017    Discharge Date and Time:  03/07/2017 4:38 PM    Attending Physician: Rafa Arreguin MD     Discharge Provider: Christie Arellano    Reason for Admission: Small bowel obstruction    Procedures Performed: Procedure(s) (LRB):  EXPLORATORY-LAPAROTOMY (N/A)  LYSIS-ADHESION (N/A)  LAPAROSCOPY-DIAGNOSTIC (N/A)    Hospital Course (synopsis of major diagnoses, care, treatment, and services provided during the course of the hospital stay): The patient was admitted from the ER with a partial small bowel obstruction. He was initially managed conservatively with bowel rest, IV fluids, NGT to low intermittent suction and kept NPO. He did not improve and was taken to the OR on hospital day 2 for diagnostic laparoscopy converted to open with lysis of adhesions. He had gradual return of bowel function and his diet was advanced appropriately. He was ambulating without issues, voiding and having bowel movements by hospital day 6 and met goals for discharge. He will follow up in 2 weeks in clinic    Consults: none    Significant Diagnostic Studies: Radiology: CT scan: CT ABDOMEN PELVIS WITH CONTRAST:   Results for orders placed or performed during the hospital encounter of 03/02/17   CT Abdomen Pelvis With Contrast    Narrative    Procedure comments: The patient was surveyed from the lung bases through the pelvis after the administration of 100 cc Omni 350 IV contrast as well as oral contrast and data was reconstructed for coronal, sagittal, and axial images.    Comparison: Renal stone study 3/1/2017.    Findings:     The lung bases demonstrate bibasilar atelectatic changes.  There is no pleural fluid present.  The visualized portions of the heart appear normal.    The liver is normal in size and attenuation with no focal hepatic abnormality.  The gallbladder is unremarkable.  There is no intra-or extrahepatic biliary ductal  dilatation.    The stomach, spleen, pancreas, and adrenal glands are unremarkable.  An accessory splenule is noted.  There is an enterogastric tube with its distal tip terminating in the stomach.    The kidneys are normal in size and location and concentrate and excrete contrast properly on delayed imaging.  There is no evidence of hydronephrosis.  The ureters appear normal in course and caliber without evidence of ureteral dilatation. The urinary bladder demonstrates no significant abnormality allowing for limited evaluation secondary to nondistention.    The abdominal aorta is normal in course and caliber without significant atherosclerotic calcifications.    There marked distention of numerous loops of proximal small bowel, which measure up to 4.5 cm in maximal diameter, within the left midabdomen.  There is associated small bowel air-fluid levels.  Enteric contrast is not identified within several of the more distal loops of dilated small bowel.  There is no evidence of pneumatosis.  There are decompressed loops of small bowel within the right lower quadrant with slight collapse of the colon.  Overall, findings highly concerning for small bowel obstruction transition point in the right lower quadrant.    There is no significant ascites, free intraperitoneal air or portal venous gas identified There is no evidence of lymph node enlargement in the abdomen or pelvis.    When viewed with bone windows the osseous structures are unremarkable.  The extraperitoneal soft tissues are unremarkable.    Impression    1. Findings consistent with small bowel obstruction with transition point in the right lower quadrant, overall, the degree of small bowel dilatation is relatively unchanged from most recent CT examination of 3/1/2017.  No evidence of pneumatosis, portal venous gas or free intraperitoneal air.    2. Additional findings as above.    The findings were observed at  06:12:58 on Thursday, March 02, 2017 and discussed  with GIANLUCA SIMMONS at 06:18:58 on Thursday, March 02, 2017 by RAKEL COLLINS.    This report has been flagged in the [facility] medical record.  ______________________________________     Electronically signed by resident: RAKEL COLLINS MD  Date:     03/02/17  Time:    06:21            As the supervising and teaching physician, I personally reviewed the images and resident's interpretation and I agree with the findings.        Electronically signed by: LTAANYA MCCRARY  Date:     03/02/17  Time:    08:19        Final Diagnoses:   Principal Problem: Small bowel obstruction   Secondary Diagnoses:   Active Hospital Problems    Diagnosis  POA   No active problems to display.      Resolved Hospital Problems    Diagnosis Date Resolved POA    *Small bowel obstruction [K56.69] 03/07/2017 Yes       Discharged Condition: good    Disposition: Home or Self Care    Follow Up/Patient Instructions:     Medications:  Reconciled Home Medications:   Current Discharge Medication List      START taking these medications    Details   docusate sodium (COLACE) 100 MG capsule Take 1 capsule (100 mg total) by mouth 2 (two) times daily as needed for Constipation.  Refills: 0      oxycodone-acetaminophen (PERCOCET) 5-325 mg per tablet Take 1 tablet by mouth every 4 (four) hours as needed.  Qty: 51 tablet, Refills: 0      polyethylene glycol (GLYCOLAX) 17 gram/dose powder Take 17 g by mouth once daily.  Qty: 289 g, Refills: 0         CONTINUE these medications which have NOT CHANGED    Details   ondansetron (ZOFRAN) 4 MG tablet Take 1 tablet (4 mg total) by mouth every 6 (six) hours as needed for Nausea.  Qty: 12 tablet, Refills: 0             Discharge Procedure Orders  Referral to OutPatient  Physical therapy   Referral Priority: Routine Referral Type: Physical Medicine   Referral Reason: Specialty Services Required    Requested Specialty: Physical Therapy    Number of Visits Requested: 1      Diet general     Lifting restrictions   Order  Comments: No lifting weights greater than 10 pounds for 6 weeks after surgery.     Shower on day dressing removed (No bath)     Weight bearing restrictions (specify)   Order Comments: No carrying weights greater than 10 pounds for 6 weeks after surgery.     Call MD for:  temperature >100.4     Call MD for:  persistent nausea and vomiting or diarrhea     Call MD for:  severe uncontrolled pain     Call MD for:  redness, tenderness, or signs of infection (pain, swelling, redness, odor or green/yellow discharge around incision site)     Call MD for:  difficulty breathing or increased cough     Call MD for:  worsening rash     Call MD for:  increased confusion or weakness     No dressing needed       Follow-up Information     Follow up with Rafa Arreguin MD In 2 weeks.    Specialty:  General Surgery    Why:  Appt 3/15/17 at 10:00 AM    Contact information:    [address]  [facility] [id number]  [phone]

## 2017-03-08 ENCOUNTER — PATIENT OUTREACH (OUTPATIENT)
Dept: ADMINISTRATIVE | Facility: CLINIC | Age: 47
End: 2017-03-08
Payer: COMMERCIAL

## 2017-03-15 ENCOUNTER — OFFICE VISIT (OUTPATIENT)
Dept: SURGERY | Facility: CLINIC | Age: 47
End: 2017-03-15
Payer: COMMERCIAL

## 2017-03-15 VITALS
SYSTOLIC BLOOD PRESSURE: 133 MMHG | DIASTOLIC BLOOD PRESSURE: 80 MMHG | TEMPERATURE: 98 F | HEART RATE: 99 BPM | BODY MASS INDEX: 32.18 KG/M2 | HEIGHT: 67 IN | WEIGHT: 205 LBS

## 2017-03-15 DIAGNOSIS — Z98.890 S/P EXPLORATORY LAPAROTOMY: Primary | ICD-10-CM

## 2017-03-15 PROCEDURE — 99999 PR PBB SHADOW E&M-EST. PATIENT-LVL III: CPT | Mod: PBBFAC,,, | Performed by: PHYSICIAN ASSISTANT

## 2017-03-15 PROCEDURE — 99024 POSTOP FOLLOW-UP VISIT: CPT | Mod: S$GLB,,, | Performed by: PHYSICIAN ASSISTANT

## 2017-03-15 NOTE — PROGRESS NOTES
SUBJECTIVE:  The patient is a 47 y.o. y/o male 2 weeks s/p diagnostic laparotomy. He denies pain, fevers, chills, nausea, vomiting, diarrhea, or constipation. Eating well with normal appetite and bowel function. Denies redness around or drainage from incisions.    OBJECTIVE:  GEN: male in NAD  ABD: soft, non-tender, non-distended  INCISIONS: staples in place - clean, dry and intact, healing well without signs of infection or hernia    ASSESSMENT/PLAN:  Doing well 2 weeks s/p diagnostic laparotomy w lysis of adhesions. Staples removed in clinic, patient tolerated it well. Patient is advised to avoid heavy lifting or strenuous activity for another 2-4 weeks. Will return to work 4/28/17. May resume light cardio. Patient may bathe and continue to take a regular diet. Will follow-up with me on an as-needed basis. All questions answered; patient is comfortable with follow-up plan.

## 2017-03-17 ENCOUNTER — OFFICE VISIT (OUTPATIENT)
Dept: SURGERY | Facility: CLINIC | Age: 47
End: 2017-03-17
Payer: COMMERCIAL

## 2017-03-17 VITALS
BODY MASS INDEX: 31.71 KG/M2 | SYSTOLIC BLOOD PRESSURE: 137 MMHG | WEIGHT: 202 LBS | DIASTOLIC BLOOD PRESSURE: 79 MMHG | HEIGHT: 67 IN | TEMPERATURE: 98 F | HEART RATE: 101 BPM

## 2017-03-17 DIAGNOSIS — Z98.890 S/P EXPLORATORY LAPAROTOMY: Primary | ICD-10-CM

## 2017-03-17 PROCEDURE — 99024 POSTOP FOLLOW-UP VISIT: CPT | Mod: S$GLB,,, | Performed by: PHYSICIAN ASSISTANT

## 2017-03-17 PROCEDURE — 99999 PR PBB SHADOW E&M-EST. PATIENT-LVL III: CPT | Mod: PBBFAC,,, | Performed by: PHYSICIAN ASSISTANT

## 2017-03-17 NOTE — PROGRESS NOTES
SUBJECTIVE:  The patient is a 47 y.o. y/o male 2 weeks s/p diagnostic laparotomy. He returns today with drainage from incision. Denies fevers, chills, nausea, vomiting. Eating well with normal appetite and bowel function.     OBJECTIVE:  GEN: male in NAD  ABD: soft, non-tender, non-distended  INCISIONS: middle of incision open with approx 1cm dehiscence, able to probe <1cm deep, near umbilicus, no surrounding erythema, no TTP, minimal drainage noted    ASSESSMENT/PLAN:  Doing well 2 weeks s/p diagnostic laparotomy w lysis of adhesions with likely small seroma noted near umbilicus. Dehiscence too small to pack, would continue to cover with large bandaid. Do not recommend abx at this time as no signs or symptoms of infection noted. Patient is advised to avoid heavy lifting or strenuous activity for another 2-4 weeks. Will return to work 4/28/17. May resume light cardio. Patient may bathe and continue to take a regular diet. Will follow-up with me Tuesday 3/21/17. All questions answered; patient is comfortable with follow-up plan.

## 2017-09-20 ENCOUNTER — OFFICE VISIT (OUTPATIENT)
Dept: URGENT CARE | Facility: CLINIC | Age: 47
End: 2017-09-20
Payer: COMMERCIAL

## 2017-09-20 VITALS
BODY MASS INDEX: 33.74 KG/M2 | TEMPERATURE: 99 F | OXYGEN SATURATION: 98 % | HEIGHT: 67 IN | HEART RATE: 72 BPM | WEIGHT: 215 LBS | SYSTOLIC BLOOD PRESSURE: 128 MMHG | DIASTOLIC BLOOD PRESSURE: 68 MMHG | RESPIRATION RATE: 18 BRPM

## 2017-09-20 DIAGNOSIS — J06.9 VIRAL UPPER RESPIRATORY TRACT INFECTION: ICD-10-CM

## 2017-09-20 DIAGNOSIS — R05.9 COUGH IN ADULT PATIENT: Primary | ICD-10-CM

## 2017-09-20 PROCEDURE — 3008F BODY MASS INDEX DOCD: CPT | Mod: S$GLB,,, | Performed by: NURSE PRACTITIONER

## 2017-09-20 PROCEDURE — 96372 THER/PROPH/DIAG INJ SC/IM: CPT | Mod: S$GLB,,, | Performed by: NURSE PRACTITIONER

## 2017-09-20 PROCEDURE — 99203 OFFICE O/P NEW LOW 30 MIN: CPT | Mod: 25,S$GLB,, | Performed by: NURSE PRACTITIONER

## 2017-09-20 RX ORDER — CODEINE PHOSPHATE AND GUAIFENESIN 10; 100 MG/5ML; MG/5ML
10 SOLUTION ORAL EVERY 6 HOURS PRN
Qty: 120 ML | Refills: 0 | Status: SHIPPED | OUTPATIENT
Start: 2017-09-20 | End: 2017-09-30

## 2017-09-20 RX ORDER — BENZONATATE 100 MG/1
100 CAPSULE ORAL EVERY 6 HOURS PRN
Qty: 30 CAPSULE | Refills: 1 | Status: SHIPPED | OUTPATIENT
Start: 2017-09-20 | End: 2017-12-13

## 2017-09-20 RX ORDER — BETAMETHASONE SODIUM PHOSPHATE AND BETAMETHASONE ACETATE 3; 3 MG/ML; MG/ML
6 INJECTION, SUSPENSION INTRA-ARTICULAR; INTRALESIONAL; INTRAMUSCULAR; SOFT TISSUE
Status: COMPLETED | OUTPATIENT
Start: 2017-09-20 | End: 2017-09-20

## 2017-09-20 RX ORDER — AZITHROMYCIN 250 MG/1
TABLET, FILM COATED ORAL
Qty: 6 TABLET | Refills: 0 | Status: SHIPPED | OUTPATIENT
Start: 2017-09-25 | End: 2017-09-29

## 2017-09-20 RX ADMIN — BETAMETHASONE SODIUM PHOSPHATE AND BETAMETHASONE ACETATE 6 MG: 3; 3 INJECTION, SUSPENSION INTRA-ARTICULAR; INTRALESIONAL; INTRAMUSCULAR; SOFT TISSUE at 10:09

## 2017-09-20 NOTE — PATIENT INSTRUCTIONS
Please take over the counter medications as directed for your symptoms. If symptoms persist for the next 5-7 days please fill the antibiotics and take as directed.    Viral Upper Respiratory Illness (Adult)  You have a viral upper respiratory illness (URI), which is another term for the common cold. This illness is contagious during the first few days. It is spread through the air by coughing and sneezing. It may also be spread by direct contact (touching the sick person and then touching your own eyes, nose, or mouth). Frequent handwashing will decrease risk of spread. Most viral illnesses go away within 7 to 10 days with rest and simple home remedies. Sometimes the illness may last for several weeks. Antibiotics will not kill a virus, and they are generally not prescribed for this condition.    Home care  · If symptoms are severe, rest at home for the first 2 to 3 days. When you resume activity, don't let yourself get too tired.  · Avoid being exposed to cigarette smoke (yours or others).  · You may use acetaminophen or ibuprofen to control pain and fever, unless another medicine was prescribed. (Note: If you have chronic liver or kidney disease, have ever had a stomach ulcer or gastrointestinal bleeding, or are taking blood-thinning medicines, talk with your healthcare provider before using these medicines.) Aspirin should never be given to anyone under 18 years of age who is ill with a viral infection or fever. It may cause severe liver or brain damage.  · Your appetite may be poor, so a light diet is fine. Avoid dehydration by drinking 6 to 8 glasses of fluids per day (water, soft drinks, juices, tea, or soup). Extra fluids will help loosen secretions in the nose and lungs.  · Over-the-counter cold medicines will not shorten the length of time youre sick, but they may be helpful for the following symptoms: cough, sore throat, and nasal and sinus congestion. (Note: Do not use decongestants if you have high  blood pressure.)  Follow-up care  Follow up with your healthcare provider, or as advised.  When to seek medical advice  Call your healthcare provider right away if any of these occur:  · Cough with lots of colored sputum (mucus)  · Severe headache; face, neck, or ear pain  · Difficulty swallowing due to throat pain  · Fever of 100.4°F (38°C)  Call 911, or get immediate medical care  Call emergency services right away if any of these occur:  · Chest pain, shortness of breath, wheezing, or difficulty breathing  · Coughing up blood  · Inability to swallow due to throat pain  Date Last Reviewed: 9/13/2015  © 8801-3243 [employer]. [address] [place] [address], [place] [address]. All rights reserved. This information is not intended as a substitute for professional medical care. Always follow your healthcare professional's instructions.        Treating Viral Respiratory Illness in Children  Viral respiratory illnesses include colds, the flu, and RSV (respiratory syncytial virus). Treatment will focus on relieving your childs symptoms and ensuring that the infection does not get worse. Antibiotics are not effective against viruses. Always see your childs healthcare provider if your child has trouble breathing.    Helping your child feel better  · Give your child plenty of fluids, such as water or apple juice.  · Make sure your child gets plenty of rest.  · Keep your infants nose clear. Use a rubber bulb suction device to remove mucus as needed. Don't be aggressive when suctioning. This may cause more swelling and discomfort.  · Raise the head of your child's bed slightly to make breathing easier.  · Run a cool-mist humidifier or vaporizer in your childs room to keep the air moist and nasal passages clear.  · Don't let anyone smoke near your child.  · Treat your childs fever with acetaminophen. In infants 6 months or older, you may use ibuprofen instead to help reduce the fever. Never give aspirin to a child  under age 18. It could cause a rare but serious condition called Reye syndrome.  When to seek medical care  Most children get over colds and flu on their own in time, with rest and care from you. Call your child's healthcare provider if your child:  · Has a fever of 100.4°F (38°C) in a baby younger than 3 months  · Has a repeated fever of 104°F (40°C) or higher  · Has nausea or vomiting, or cant keep even small amounts of liquid down  · Hasnt urinated for 6 hours or more, or has dark or strong-smelling urine  · Has a harsh cough, a cough that doesn't get better, wheezing, or trouble breathing  · Has bad or increasing pain  · Develops a skin rash  · Is very tired or lethargic  · Develops a blue color to the skin around the lips or on the fingers or toes  Date Last Reviewed: 1/1/2017  © 6100-2762 [employer]. [address] [place] [address], [place] [address]. All rights reserved. This information is not intended as a substitute for professional medical care. Always follow your healthcare professional's instructions.

## 2017-09-20 NOTE — PROGRESS NOTES
"Subjective:       Patient ID: Rafa Crowley is a 47 y.o. male.    Vitals:  height is 5' 7" (1.702 m) and weight is 97.5 kg (215 lb). His temperature is 98.5 °F (36.9 °C). His blood pressure is 128/68 and his pulse is 72. His respiration is 18 and oxygen saturation is 98%.     Chief Complaint: URI    States his symptoms started 2 days ago and the cough kept him up all night. Has been taking Mucinex without relief.      URI    This is a new problem. The current episode started yesterday. The problem has been gradually worsening. There has been no fever. Associated symptoms include congestion, coughing, a plugged ear sensation, rhinorrhea, sinus pain and sneezing. Pertinent negatives include no abdominal pain, chest pain, ear pain, headaches, nausea, rash, sore throat or wheezing. He has tried decongestant for the symptoms. The treatment provided no relief.     Review of Systems   Constitution: Positive for malaise/fatigue. Negative for chills and fever.   HENT: Positive for congestion, hoarse voice, rhinorrhea, sinus pain and sneezing. Negative for ear pain and sore throat.    Eyes: Negative for discharge and redness.   Cardiovascular: Positive for dyspnea on exertion. Negative for chest pain and leg swelling.   Respiratory: Positive for cough and sputum production. Negative for shortness of breath and wheezing.    Hematologic/Lymphatic: Negative for adenopathy.   Skin: Negative for rash.   Musculoskeletal: Negative for myalgias.   Gastrointestinal: Negative for abdominal pain and nausea.   Neurological: Negative for dizziness, headaches and light-headedness.   All other systems reviewed and are negative.      Objective:      Physical Exam   Constitutional: He is oriented to person, place, and time. He appears well-developed and well-nourished.  Non-toxic appearance. He does not have a sickly appearance. He does not appear ill.   HENT:   Head: Normocephalic and atraumatic.   Right Ear: Hearing, tympanic membrane, " external ear and ear canal normal.   Left Ear: Hearing, tympanic membrane, external ear and ear canal normal.   Nose: Nose normal. Right sinus exhibits no maxillary sinus tenderness and no frontal sinus tenderness. Left sinus exhibits no maxillary sinus tenderness and no frontal sinus tenderness.   Mouth/Throat: Uvula is midline. Oropharyngeal exudate and posterior oropharyngeal erythema present.   Eyes: Conjunctivae and EOM are normal. Pupils are equal, round, and reactive to light.   Neck: Normal range of motion. Neck supple.   Cardiovascular: Normal rate, regular rhythm and normal heart sounds.    Pulmonary/Chest: Effort normal and breath sounds normal. He has no wheezes.   Dry cough during assessment   Lymphadenopathy:     He has no cervical adenopathy.        Right cervical: No superficial cervical adenopathy present.       Left cervical: No superficial cervical adenopathy present.   Neurological: He is alert and oriented to person, place, and time.   Skin: Skin is warm and dry. Capillary refill takes less than 2 seconds.   Psychiatric: He has a normal mood and affect.   Nursing note and vitals reviewed.      Assessment:       1. Cough in adult patient    2. Viral upper respiratory tract infection        Plan:         Cough in adult patient  -     betamethasone acetate-betamethasone sodium phosphate injection 6 mg; Inject 1 mL (6 mg total) into the muscle one time.  -     guaifenesin-codeine 100-10 mg/5 ml (CHERATUSSIN AC)  mg/5 mL syrup; Take 10 mLs by mouth every 6 (six) hours as needed for Cough (at night).  Dispense: 120 mL; Refill: 0  -     azithromycin (Z-MARSHA) 250 MG tablet; Take 2 tablets by mouth on day 1; Take 1 tablet by mouth on days 2-5  Dispense: 6 tablet; Refill: 0  -     benzonatate (TESSALON PERLES) 100 MG capsule; Take 1 capsule (100 mg total) by mouth every 6 (six) hours as needed for Cough.  Dispense: 30 capsule; Refill: 1    Viral upper respiratory tract infection  -     betamethasone  acetate-betamethasone sodium phosphate injection 6 mg; Inject 1 mL (6 mg total) into the muscle one time.  -     guaifenesin-codeine 100-10 mg/5 ml (CHERATUSSIN AC)  mg/5 mL syrup; Take 10 mLs by mouth every 6 (six) hours as needed for Cough (at night).  Dispense: 120 mL; Refill: 0  -     azithromycin (Z-MARSHA) 250 MG tablet; Take 2 tablets by mouth on day 1; Take 1 tablet by mouth on days 2-5  Dispense: 6 tablet; Refill: 0  -     benzonatate (TESSALON PERLES) 100 MG capsule; Take 1 capsule (100 mg total) by mouth every 6 (six) hours as needed for Cough.  Dispense: 30 capsule; Refill: 1

## 2017-09-20 NOTE — LETTER
September 20, 2017      [facility] [address], [address]  [place] [address]  Phone: [phone]  Fax: [phone]       Patient: Rafa Crowley   YOB: 1970  Date of Visit: 09/20/2017    To Whom It May Concern:    Shanda Crowley  was at [facility] on 09/20/2017. He may return to work/school on 09/21/17 with no restrictions. If you have any questions or concerns, or if I can be of further assistance, please do not hesitate to contact me.    Sincerely,    Renetta Ramos, THOMPSON

## 2017-11-13 ENCOUNTER — TELEPHONE (OUTPATIENT)
Dept: PULMONOLOGY | Facility: CLINIC | Age: 47
End: 2017-11-13

## 2017-11-13 NOTE — TELEPHONE ENCOUNTER
lvm there are no opening for new pt schedule for dr Bronson at the [facility] location. Pt will be put on the wait list

## 2017-11-13 NOTE — TELEPHONE ENCOUNTER
----- Message from Amado Lakhani sent at 11/10/2017  4:46 PM CST -----  Contact: Pt  X_ 1st Request  _ 2nd Request  _ 3rd Request    Who:ALBERTO CHASE [[id number]]    Why: Patient would like to schedule a new patient appointment on the [facility].    What Number to Call Back: [phone]    When to Expect a call back: (Before the end of the day)  -- if call after 3:00 call back will be tomorrow.

## 2017-12-13 ENCOUNTER — OFFICE VISIT (OUTPATIENT)
Dept: PULMONOLOGY | Facility: CLINIC | Age: 47
End: 2017-12-13
Payer: COMMERCIAL

## 2017-12-13 VITALS
WEIGHT: 223 LBS | BODY MASS INDEX: 35 KG/M2 | DIASTOLIC BLOOD PRESSURE: 80 MMHG | HEIGHT: 67 IN | OXYGEN SATURATION: 99 % | SYSTOLIC BLOOD PRESSURE: 114 MMHG | HEART RATE: 91 BPM

## 2017-12-13 DIAGNOSIS — E66.9 OBESITY, UNSPECIFIED CLASSIFICATION, UNSPECIFIED OBESITY TYPE, UNSPECIFIED WHETHER SERIOUS COMORBIDITY PRESENT: ICD-10-CM

## 2017-12-13 DIAGNOSIS — R09.81 NASAL CONGESTION: ICD-10-CM

## 2017-12-13 DIAGNOSIS — G47.33 OSA (OBSTRUCTIVE SLEEP APNEA): Primary | ICD-10-CM

## 2017-12-13 PROCEDURE — 99999 PR PBB SHADOW E&M-EST. PATIENT-LVL III: CPT | Mod: PBBFAC,,, | Performed by: INTERNAL MEDICINE

## 2017-12-13 PROCEDURE — 99204 OFFICE O/P NEW MOD 45 MIN: CPT | Mod: S$GLB,,, | Performed by: INTERNAL MEDICINE

## 2017-12-13 NOTE — PROGRESS NOTES
Rafa Crowley  was seen as a new patient for the evaluation of  Loud snoring.  .    CHIEF COMPLAINT:    Chief Complaint   Patient presents with    Sleep Apnea       HISTORY OF PRESENT ILLNESS: Rafa Crowley is a 47 y.o. male is here for sleep evaluation.   Patient with loud snoring.  +witnessed apnea during sleep by girlfriend.  No parasomnia.  No cataplexy.  No rsl symptoms.  +weight gain of 50 lbs since 2007.  S/p knee surgery 2008 and 2012.    [place] Sleepiness Scale score during initial sleep evaluation was 5.    SLEEP ROUTINE:  Activity the hour prior to sleep: watch tv     Bed partner:  girlfriend  Time to bed:  12 am   Lights off:  tv off  Sleep onset latency:  10 minutes        Disruptions or awakenings:    1 time     Wakeup time:      7:30 am   Perceived sleep quality:  tire       Daytime naps:      Rarely   Weekend sleep routine:      1 am to 8 am   Caffeine use: 18 oz soda per day  exercise habit:   limited      PAST MEDICAL HISTORY:    Active Ambulatory Problems     Diagnosis Date Noted    Bronchitis 05/10/2014     Resolved Ambulatory Problems     Diagnosis Date Noted    Small bowel obstruction 03/02/2017     No Additional Past Medical History                PAST SURGICAL HISTORY:    Past Surgical History:   Procedure Laterality Date    ABDOMINAL SURGERY      knee scope           FAMILY HISTORY:                Family History   Problem Relation Age of Onset    Cancer Mother      kidney    Cancer Father      bladder    Cancer Maternal Aunt      kidney       SOCIAL HISTORY:          Tobacco:   History   Smoking Status    Never Smoker   Smokeless Tobacco    Former User    Quit date: 3/13/2017       alcohol use:    History   Alcohol Use    Yes     Comment: socially                 Occupation:      ALLERGIES:    Review of patient's allergies indicates:   Allergen Reactions    Penicillins        CURRENT MEDICATIONS:    No current outpatient prescriptions on file.     No current  "facility-administered medications for this visit.                   REVIEW OF SYSTEMS:     Sleep related symptoms as per HPI.  CONST:+ weight gain    HEENT: +mild sinus congestion  PULM: Denies dyspnea  CARD:  Denies palpitations   GI:  Denies acid reflux  : Denies polyuria  NEURO: Denies headaches  PSYCH: Denies mood disturbance  HEME: Denies anemia   Otherwise, a balance of systems reviewed is negative.          PHYSICAL EXAM:  Vitals:    12/13/17 1403   BP: 114/80   Pulse: 91   SpO2: 99%   Weight: 101.2 kg (223 lb)   Height: 5' 7" (1.702 m)   PainSc: 0-No pain     Body mass index is 34.93 kg/m².     GENERAL: Normal development, well groomed  HEENT:  Conjunctivae are non-erythematous; Pupils equal, round, and reactive to light; Nose is symmetrical; Nasal mucosa is pink and moist; Septum is midline; Inferior turbinates are normal; Nasal airflow is normal; Posterior pharynx is pink; Modified Mallampati: 2; Posterior palate is normal; Tonsils +1; Uvula is normal and pink;Tongue is normal; Dentition is fair; No TMJ tenderness; Jaw opening and protrusion without click and without discomfort.  NECK: Supple. Neck circumference is 18 inches. No thyromegaly. No palpable nodes.     SKIN: On face and neck: No abrasions, no rashes, no lesions.  No subcutaneous nodules are palpable.  RESPIRATORY: Chest is clear to auscultation.  Normal chest expansion and non-labored breathing at rest.  CARDIOVASCULAR: Normal S1, S2.  No murmurs, gallops or rubs. No carotid bruits bilaterally.  EXTREMITIES: No edema. No clubbing. No cyanosis. Station normal. Gait normal.        NEURO/PSYCH: Oriented to time, place and person. Normal attention span and concentration. Affect is full. Mood is normal.                                              DATA no prior sleep study  No results found for: TSH  ASSESSMENT    ICD-10-CM ICD-9-CM    1. BELIA (obstructive sleep apnea) G47.33 327.23 Home Sleep Studies   2. Nasal congestion R09.81 478.19    3. " Obesity, unspecified classification, unspecified obesity type, unspecified whether serious comorbidity present E66.9 278.00        PLAN:    Sleep Apnea NEC. The patient symptomatically has loud snoring, witnessed apnea, restless sleep with findings of enlarged neck. This warrants further investigation for possible obstructive sleep apnea.  Patient will be contacted after sleep study is done.      Nasal congestion - sinus irrigation    Obesity - aware of need for drastic weight loss.      Diagnostic: Polysomnogram. The nature of this procedure and its indication was discussed with the patient.     Education: During our discussion today, we talked about the etiology of obstructive sleep apnea as well as the potential ramifications of untreated sleep apnea, which could include daytime sleepiness, hypertension, heart disease and/or stroke.     Precautions: The patient was advised to abstain from driving should they feel sleepy or drowsy.       Patient will Return after sleep study. with md/np.      This is 45 minutes visit, over 50% of time spent in direct consultation with patient.

## 2017-12-13 NOTE — PATIENT INSTRUCTIONS
Nina or Loco will contact you to schedule your sleep study. Their number is [phone] (ext 2). The [facility] is located on 7th floor of the [facility].    We will call you when the sleep study results are ready - if you have not heard from us by 2 weeks from the date of the study, please call [phone] (ext 1).    You are advised to abstain from driving should you feel sleepy or drowsy.     1.  NeilMed Sinus Rinse Regular Kit    Recipe 1/4 teaspoon of salt and 1/4 teaspoon of baking soda in distilled water.  Be sure to tilt head forward as shown in picture.

## 2018-01-03 ENCOUNTER — TELEPHONE (OUTPATIENT)
Dept: PULMONOLOGY | Facility: CLINIC | Age: 48
End: 2018-01-03

## 2018-01-03 NOTE — TELEPHONE ENCOUNTER
----- Message from Sharonda Torrez sent at 1/3/2018 10:55 AM CST -----  Contact: Pt  [phone]  Bronson  /  Pt calling to make an appt for sleep study exam , call the pt back to schedule   Call  back number [phone]  Thanks,

## 2018-01-03 NOTE — TELEPHONE ENCOUNTER
Left voicemail with department to contact patient to schedule sleep study. Advised patient to please call me back if he does not hear from them today.

## 2018-01-04 ENCOUNTER — TELEPHONE (OUTPATIENT)
Dept: SLEEP MEDICINE | Facility: CLINIC | Age: 48
End: 2018-01-04

## 2018-01-05 ENCOUNTER — TELEPHONE (OUTPATIENT)
Dept: PULMONOLOGY | Facility: CLINIC | Age: 48
End: 2018-01-05

## 2018-01-05 NOTE — TELEPHONE ENCOUNTER
----- Message from Sania Posey sent at 1/5/2018  2:04 PM CST -----  Contact: Self- [phone]  Called asking to speak to Dr Bronson wanting to schedule the sleep study. Called insurance and was told that no pre-auth is needed. Please call.    [phone]

## 2018-01-05 NOTE — TELEPHONE ENCOUNTER
Left voicemail that insurance issue has been resolved. Advised the therapist Last should be calling him this evening to set this up.

## 2018-01-08 ENCOUNTER — TELEPHONE (OUTPATIENT)
Dept: SLEEP MEDICINE | Facility: HOSPITAL | Age: 48
End: 2018-01-08

## 2018-01-08 ENCOUNTER — TELEPHONE (OUTPATIENT)
Dept: SLEEP MEDICINE | Facility: OTHER | Age: 48
End: 2018-01-08

## 2018-01-08 ENCOUNTER — TELEPHONE (OUTPATIENT)
Dept: PULMONOLOGY | Facility: CLINIC | Age: 48
End: 2018-01-08

## 2018-01-08 DIAGNOSIS — G47.33 OSA (OBSTRUCTIVE SLEEP APNEA): Primary | ICD-10-CM

## 2018-01-08 NOTE — TELEPHONE ENCOUNTER
----- Message from Sharonda Torrez sent at 1/8/2018  8:57 AM CST -----  Contact: Pt   [phone]  Bronson  /   Pt  Calling to speak with the nurse in regards to an in home sleep study test , call the pt back to schedule [phone]  Thanks,

## 2018-01-08 NOTE — TELEPHONE ENCOUNTER
Please inform patient that an order for in lab sleep study was placed.  We are awaiting insurance approval.  That process can take up to 2 weeks.  Patient should call back if he does get a call beyond 2 weeks.

## 2018-01-08 NOTE — TELEPHONE ENCOUNTER
----- Message from Rosalie Youssef MA sent at 1/8/2018  9:15 AM CST -----  Contact: Pt   [phone]  Pt wants an in lab sleep study. Can you please put in for an inlab sleep study please.  ----- Message -----  From: Sharonda Torrez  Sent: 1/8/2018   8:57 AM  To: Aiyana Lora V Staff    Bronson  /   Pt  Calling to speak with the nurse in regards to an in home sleep study test , call the pt back to schedule [phone]  Thanks,

## 2018-01-08 NOTE — TELEPHONE ENCOUNTER
Spoke with pt. I advised pt that since he would like to have an in lab sleep study that the order will have to be changes from home to in lab and then once that is in, we have to wait for it to be approved. When the study becomes approved the sleep lab will call the pt and set up and schedule the appointment for the study. The pt stated that he understood and agreed with verbal read back.

## 2018-01-24 ENCOUNTER — TELEPHONE (OUTPATIENT)
Dept: PULMONOLOGY | Facility: CLINIC | Age: 48
End: 2018-01-24

## 2018-01-24 NOTE — TELEPHONE ENCOUNTER
Spoke with pt. I advised the pt that he wanted to do an in lab sleep study instead of a home sleep study. The pt stated that he has been waiting since dec 13 to find out when he will have his sleep study scheduled. The pt also stated that he talked to his insurance company and they said they were waiting for it to go through. I advised the pt that I will give the sleep lab a call to see what is going on and when I get answer back that I will call the pt back. The pt stated that he agreed and understood with verbal read back.

## 2018-02-06 ENCOUNTER — TELEPHONE (OUTPATIENT)
Dept: SLEEP MEDICINE | Facility: OTHER | Age: 48
End: 2018-02-06

## 2018-03-01 ENCOUNTER — TELEPHONE (OUTPATIENT)
Dept: SLEEP MEDICINE | Facility: HOSPITAL | Age: 48
End: 2018-03-01

## 2018-03-02 ENCOUNTER — HOSPITAL ENCOUNTER (OUTPATIENT)
Dept: SLEEP MEDICINE | Facility: HOSPITAL | Age: 48
Discharge: HOME | End: 2018-03-02
Attending: INTERNAL MEDICINE
Payer: COMMERCIAL

## 2018-03-02 DIAGNOSIS — G47.33 OSA (OBSTRUCTIVE SLEEP APNEA): ICD-10-CM

## 2018-03-02 PROCEDURE — 95811 POLYSOM 6/>YRS CPAP 4/> PARM: CPT

## 2018-03-02 PROCEDURE — 95811 POLYSOM 6/>YRS CPAP 4/> PARM: CPT | Mod: 26,,, | Performed by: INTERNAL MEDICINE

## 2018-03-02 PROCEDURE — 95811 PR POLYSOMNOGRAPHY W/CPAP: ICD-10-PCS | Mod: 26,,, | Performed by: INTERNAL MEDICINE

## 2018-03-03 NOTE — PROGRESS NOTES
"Mr Rafa Crowley is a 48 y/o M. Here to R/O BELIA. Pt reported loud snoring and witnessed apnea. Pt education was performed including the possibility of using CPAP machine and different types of mask available. He was also instructed on how to call out for help.    He qualified for split night study. He had a hard time initiating sleep during the titration portion of the study. A full face mask was first use due to report of mouth breathing. It was then changed to Resmed Airfit 10 nasal pillow. He tolerated this mask much better. He requested not to use a chin strap due to feeling too confined. REM sleep obtained on lateral sleeping position. CPAP pressure of 4 to 16 was explored. At CPAP of 4, he reported this was too strong.    EKG: NSR    Lowest oxygen sat: 81%    His reaction to CPAP in the morning was, "it will take some time to get use to it, its okay".    "

## 2018-03-03 NOTE — PATIENT INSTRUCTIONS
Your sleep study will be scored and interpreted by one of our physicians who are board certified in sleep medicine.  Within two weeks the results will be sent to the physician who referred you. Your physician should then contact you to go over the results, along with any recommendations. If you do not hear from your physician within two weeks, please call them. Please make a note of your CPAP pressure. This is very important in case you need to stay in a hospital. You will need to know this pressure to let your Respiratory Therapist know how to set up your CPAP pressure.    If CPAP (the mask you wore during the night) is recommended, it will be ordered by your physician and a home health company will contact you for delivery and set up.  If you do not receive your CPAP in about two weeks, please notify your physician. You will need to use your CPAP machine every night to be compliant with your insurance company. If you have trouble using it, please call your home health company.

## 2018-03-13 ENCOUNTER — TELEPHONE (OUTPATIENT)
Dept: SLEEP MEDICINE | Facility: CLINIC | Age: 48
End: 2018-03-13

## 2018-03-13 NOTE — TELEPHONE ENCOUNTER
----- Message from Guido Bronson MD sent at 3/13/2018  4:10 PM CDT -----  Please schedule sleep clinic appointmetnt with md/np in 1-2 weeks to discuss sleep study result.

## 2018-03-23 ENCOUNTER — OFFICE VISIT (OUTPATIENT)
Dept: SLEEP MEDICINE | Facility: CLINIC | Age: 48
End: 2018-03-23
Payer: COMMERCIAL

## 2018-03-23 VITALS
HEART RATE: 75 BPM | OXYGEN SATURATION: 97 % | DIASTOLIC BLOOD PRESSURE: 82 MMHG | WEIGHT: 215.63 LBS | BODY MASS INDEX: 33.84 KG/M2 | SYSTOLIC BLOOD PRESSURE: 126 MMHG | HEIGHT: 67 IN

## 2018-03-23 DIAGNOSIS — G47.00 INSOMNIA, UNSPECIFIED TYPE: ICD-10-CM

## 2018-03-23 DIAGNOSIS — G47.33 OSA (OBSTRUCTIVE SLEEP APNEA): Primary | ICD-10-CM

## 2018-03-23 PROCEDURE — 99999 PR PBB SHADOW E&M-EST. PATIENT-LVL III: CPT | Mod: PBBFAC,,, | Performed by: INTERNAL MEDICINE

## 2018-03-23 PROCEDURE — 99214 OFFICE O/P EST MOD 30 MIN: CPT | Mod: S$GLB,,, | Performed by: INTERNAL MEDICINE

## 2018-03-23 RX ORDER — ZOLPIDEM TARTRATE 5 MG/1
5 TABLET ORAL NIGHTLY PRN
Qty: 30 TABLET | Refills: 0 | Status: ON HOLD | OUTPATIENT
Start: 2018-03-23 | End: 2021-12-19 | Stop reason: HOSPADM

## 2018-03-23 NOTE — PROGRESS NOTES
Rafa Crowley  was seen as a follow up.    CHIEF COMPLAINT:    Chief Complaint   Patient presents with    Sleep Apnea       HISTORY OF PRESENT ILLNESS: Rafa Crowley is a 48 y.o. male is here for sleep evaluation.   Patient with loud snoring.  +witnessed apnea during sleep by girlfriend.  No parasomnia.  No cataplexy.  No rsl symptoms.  +weight gain of 50 lbs since 2007.  S/p knee surgery 2008 and 2012.    [place] Sleepiness Scale score during initial sleep evaluation was 5.    Patient s/p split study 3/2/18.  No new issue.  Titration experience was poor.  Patient did not wear chin strap during titration.      SLEEP ROUTINE:  Activity the hour prior to sleep: watch tv     Bed partner:  girlfriend  Time to bed:  12 am   Lights off:  tv off  Sleep onset latency:  10 minutes        Disruptions or awakenings:    1 time     Wakeup time:      7:30 am   Perceived sleep quality:  tire       Daytime naps:      Rarely   Weekend sleep routine:      1 am to 8 am   Caffeine use: 18 oz soda per day  exercise habit:   limited      PAST MEDICAL HISTORY:    Active Ambulatory Problems     Diagnosis Date Noted    Bronchitis 05/10/2014    BELIA (obstructive sleep apnea)      Resolved Ambulatory Problems     Diagnosis Date Noted    Small bowel obstruction 03/02/2017     No Additional Past Medical History                PAST SURGICAL HISTORY:    Past Surgical History:   Procedure Laterality Date    ABDOMINAL SURGERY      knee scope           FAMILY HISTORY:                Family History   Problem Relation Age of Onset    Cancer Mother      kidney    Cancer Father      bladder    Cancer Maternal Aunt      kidney       SOCIAL HISTORY:          Tobacco:   History   Smoking Status    Never Smoker   Smokeless Tobacco    Former User    Quit date: 3/13/2017       alcohol use:    History   Alcohol Use    Yes     Comment: socially                 Occupation:      ALLERGIES:    Review of patient's allergies indicates:  "  Allergen Reactions    Penicillins        CURRENT MEDICATIONS:    Current Outpatient Prescriptions   Medication Sig Dispense Refill    zolpidem (AMBIEN) 5 MG Tab Take 1 tablet (5 mg total) by mouth nightly as needed. 30 tablet 0     No current facility-administered medications for this visit.                   REVIEW OF SYSTEMS:     Sleep related symptoms as per HPI.  CONST:+ weight gain    HEENT: +mild sinus congestion  PULM: Denies dyspnea  CARD:  Denies palpitations   GI:  Denies acid reflux  : Denies polyuria  NEURO: Denies headaches  PSYCH: Denies mood disturbance  HEME: Denies anemia   Otherwise, a balance of systems reviewed is negative.          PHYSICAL EXAM:  Vitals:    03/23/18 1154   BP: 126/82   Pulse: 75   SpO2: 97%   Weight: 97.8 kg (215 lb 9.8 oz)   Height: 5' 7" (1.702 m)     Body mass index is 33.77 kg/m².     GENERAL: Normal development, well groomed  HEENT:  Conjunctivae are non-erythematous; Pupils equal, round, and reactive to light; Nose is symmetrical; Nasal mucosa is pink and moist; Septum is midline; Inferior turbinates are normal; Nasal airflow is normal; Posterior pharynx is pink; Modified Mallampati: 2; Posterior palate is normal; Tonsils +1; Uvula is normal and pink;Tongue is normal; Dentition is fair; No TMJ tenderness; Jaw opening and protrusion without click and without discomfort.  NECK: Supple. Neck circumference is 18 inches. No thyromegaly. No palpable nodes.     SKIN: On face and neck: No abrasions, no rashes, no lesions.  No subcutaneous nodules are palpable.  RESPIRATORY: Chest is clear to auscultation.  Normal chest expansion and non-labored breathing at rest.  CARDIOVASCULAR: Normal S1, S2.  No murmurs, gallops or rubs. No carotid bruits bilaterally.  EXTREMITIES: No edema. No clubbing. No cyanosis. Station normal. Gait normal.        NEURO/PSYCH: Oriented to time, place and person. Normal attention span and concentration. Affect is full. Mood is normal.                 "                              DATA   Split study 3/2/18  ahi of 35.3; optimal cpap was not available.  No results found for: TSH  ASSESSMENT    ICD-10-CM ICD-9-CM    1. BELIA (obstructive sleep apnea) G47.33 327.23 CPAP FOR HOME USE   2. Insomnia, unspecified type G47.00 780.52 zolpidem (AMBIEN) 5 MG Tab       PLAN:    Sleep Apnea NEC. - result of sleep study d/w patient.  Recommend retitration.  Patient is hesitant due to high copay.  I suspect increased ahi with higher pressure may related to leakage a/w mouth opening.  Will set up apap.  If clinical improvement is not observed, then will bring back for retitration.      Nasal congestion - sinus irrigation    Obesity - aware of need for drastic weight loss.      Education: During our discussion today, we talked about the etiology of obstructive sleep apnea as well as the potential ramifications of untreated sleep apnea, which could include daytime sleepiness, hypertension, heart disease and/or stroke.     Precautions: The patient was advised to abstain from driving should they feel sleepy or drowsy.       Patient will Follow-up in about 6 weeks (around 5/4/2018). with md/np.      This is 25 minutes visit, over 50% of time spent in direct consultation with patient.

## 2018-04-02 ENCOUNTER — TELEPHONE (OUTPATIENT)
Dept: PULMONOLOGY | Facility: HOSPITAL | Age: 48
End: 2018-04-02

## 2018-04-02 DIAGNOSIS — G47.33 OSA (OBSTRUCTIVE SLEEP APNEA): Primary | ICD-10-CM

## 2018-04-03 NOTE — TELEPHONE ENCOUNTER
----- Message from Roberta Cam, CRT sent at 3/28/2018  4:55 PM CDT -----  Regarding: CPAP Setup  Contact: [phone]  Good Afternoon. Mr. Crowley was setup on his CPAP today and he wanted to go with a FFM instead of a Nasal mask. He stated that he was worried that the chin strap wouldn't prevent him from opening his mouth while sleeping. He was fitted with the [place] FFM during his setup and he chose to go with that mask.

## 2018-05-23 ENCOUNTER — OFFICE VISIT (OUTPATIENT)
Dept: PULMONOLOGY | Facility: CLINIC | Age: 48
End: 2018-05-23
Payer: COMMERCIAL

## 2018-05-23 VITALS
HEIGHT: 67 IN | WEIGHT: 220 LBS | SYSTOLIC BLOOD PRESSURE: 118 MMHG | DIASTOLIC BLOOD PRESSURE: 76 MMHG | OXYGEN SATURATION: 99 % | HEART RATE: 58 BPM | BODY MASS INDEX: 34.53 KG/M2

## 2018-05-23 DIAGNOSIS — E66.9 OBESITY, UNSPECIFIED CLASSIFICATION, UNSPECIFIED OBESITY TYPE, UNSPECIFIED WHETHER SERIOUS COMORBIDITY PRESENT: ICD-10-CM

## 2018-05-23 DIAGNOSIS — G47.33 OSA (OBSTRUCTIVE SLEEP APNEA): Primary | ICD-10-CM

## 2018-05-23 DIAGNOSIS — R09.81 NASAL CONGESTION: ICD-10-CM

## 2018-05-23 PROCEDURE — 3008F BODY MASS INDEX DOCD: CPT | Mod: CPTII,S$GLB,, | Performed by: INTERNAL MEDICINE

## 2018-05-23 PROCEDURE — 99214 OFFICE O/P EST MOD 30 MIN: CPT | Mod: S$GLB,,, | Performed by: INTERNAL MEDICINE

## 2018-05-23 PROCEDURE — 99999 PR PBB SHADOW E&M-EST. PATIENT-LVL III: CPT | Mod: PBBFAC,,, | Performed by: INTERNAL MEDICINE

## 2018-05-23 NOTE — PROGRESS NOTES
Rafa Crowley  was seen as a follow up.    CHIEF COMPLAINT:    Chief Complaint   Patient presents with    Apnea    Follow-up       HISTORY OF PRESENT ILLNESS: Rafa Crowley is a 48 y.o. male is here for sleep evaluation.   Our first encounter was 12/13/17.  At that time, patient  endorsed with loud snoring.  +witnessed apnea during sleep by girlfriend.  No parasomnia.  No cataplexy.  No rsl symptoms.  +weight gain of 50 lbs since 2007.  S/p knee surgery 2008 and 2012.    [place] Sleepiness Scale score during initial sleep evaluation was 5.    Patient s/p split study 3/2/18.  No new issue.  Titration experience was poor.  Patient did not wear chin strap during titration.  Patient was set up with apap during last visit.  Patient is wearing apap every night.  feelilng rested with apap.  Per girlfriend, patient still snore.  Some nights are better than other.  +severe dry mouth.      SLEEP ROUTINE:  Activity the hour prior to sleep: watch tv     Bed partner:  girlfriend  Time to bed:  12 am   Lights off:  tv off  Sleep onset latency:  10 minutes        Disruptions or awakenings:    1 time     Wakeup time:      7:30 am   Perceived sleep quality:  tire       Daytime naps:      Rarely   Weekend sleep routine:      1 am to 8 am   Caffeine use: 18 oz soda per day  exercise habit:   limited      PAST MEDICAL HISTORY:    Active Ambulatory Problems     Diagnosis Date Noted    Bronchitis 05/10/2014    BELIA (obstructive sleep apnea)      Resolved Ambulatory Problems     Diagnosis Date Noted    Small bowel obstruction 03/02/2017     No Additional Past Medical History                PAST SURGICAL HISTORY:    Past Surgical History:   Procedure Laterality Date    ABDOMINAL SURGERY      knee scope           FAMILY HISTORY:                Family History   Problem Relation Age of Onset    Cancer Mother         kidney    Cancer Father         bladder    Cancer Maternal Aunt         kidney       SOCIAL HISTORY:         "  Tobacco:   History   Smoking Status    Never Smoker   Smokeless Tobacco    Former User    Quit date: 3/13/2017       alcohol use:    History   Alcohol Use    Yes     Comment: socially                 Occupation:      ALLERGIES:    Review of patient's allergies indicates:   Allergen Reactions    Penicillins        CURRENT MEDICATIONS:    Current Outpatient Prescriptions   Medication Sig Dispense Refill    zolpidem (AMBIEN) 5 MG Tab Take 1 tablet (5 mg total) by mouth nightly as needed. 30 tablet 0     No current facility-administered medications for this visit.                   REVIEW OF SYSTEMS:     Sleep related symptoms as per HPI.  CONST:+ weight gain    HEENT: +mild sinus congestion  PULM: Denies dyspnea  CARD:  Denies palpitations   GI:  Denies acid reflux  : Denies polyuria  NEURO: Denies headaches  PSYCH: Denies mood disturbance  HEME: Denies anemia   Otherwise, a balance of systems reviewed is negative.          PHYSICAL EXAM:  Vitals:    05/23/18 0914   BP: 118/76   Pulse: (!) 58   SpO2: 99%   Weight: 99.8 kg (220 lb 0.3 oz)   Height: 5' 7" (1.702 m)   PainSc: 0-No pain     Body mass index is 34.46 kg/m².     GENERAL: Normal development, well groomed  HEENT:  Conjunctivae are non-erythematous; Pupils equal, round, and reactive to light; Nose is symmetrical; Nasal mucosa is pink and moist; Septum is midline; Inferior turbinates are normal; Nasal airflow is normal; Posterior pharynx is pink; Modified Mallampati: 2; Posterior palate is normal; Tonsils +1; Uvula is normal and pink;Tongue is normal; Dentition is fair; No TMJ tenderness; Jaw opening and protrusion without click and without discomfort.  NECK: Supple. Neck circumference is 18 inches. No thyromegaly. No palpable nodes.     SKIN: On face and neck: No abrasions, no rashes, no lesions.  No subcutaneous nodules are palpable.  RESPIRATORY: Chest is clear to auscultation.  Normal chest expansion and non-labored breathing at " rest.  CARDIOVASCULAR: Normal S1, S2.  No murmurs, gallops or rubs. No carotid bruits bilaterally.  EXTREMITIES: No edema. No clubbing. No cyanosis. Station normal. Gait normal.        NEURO/PSYCH: Oriented to time, place and person. Normal attention span and concentration. Affect is full. Mood is normal.                                              DATA   Split study 3/2/18  ahi of 35.3; optimal cpap was not available.  No results found for: TSH  ASSESSMENT    ICD-10-CM ICD-9-CM    1. BELIA (obstructive sleep apnea) G47.33 327.23 CPAP/BIPAP SUPPLIES   2. Nasal congestion R09.81 478.19    3. Obesity, unspecified classification, unspecified obesity type, unspecified whether serious comorbidity present E66.9 278.00        PLAN:    Sleep Apnea NEC. - patient is benefiting from apap.  P90 13.8.  Still with snoring while on apap.  Will increase apap from 6-15 to 10-18.  Will switch to nasal mask with chin strap to help with dry mouth.  Optimize nasal congestion.    Nasal congestion - sinus irrigation.  Will add nasal steroid.      Obesity - aware of need for drastic weight loss.      Education: During our discussion today, we talked about the etiology of obstructive sleep apnea as well as the potential ramifications of untreated sleep apnea, which could include daytime sleepiness, hypertension, heart disease and/or stroke.     Precautions: The patient was advised to abstain from driving should they feel sleepy or drowsy.       Patient will Follow-up in about 3 months (around 8/23/2018). with md/np.      This is 25 minutes visit, over 50% of time spent in direct consultation with patient.

## 2018-07-03 ENCOUNTER — TELEPHONE (OUTPATIENT)
Dept: PULMONOLOGY | Facility: CLINIC | Age: 48
End: 2018-07-03

## 2018-07-03 NOTE — TELEPHONE ENCOUNTER
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 Please call pt back to discuss. No other information was provided.

## 2019-08-06 ENCOUNTER — LAB VISIT (OUTPATIENT)
Dept: LAB | Facility: HOSPITAL | Age: 49
End: 2019-08-06
Payer: COMMERCIAL

## 2019-08-06 ENCOUNTER — OFFICE VISIT (OUTPATIENT)
Dept: FAMILY MEDICINE | Facility: CLINIC | Age: 49
End: 2019-08-06
Payer: COMMERCIAL

## 2019-08-06 VITALS
WEIGHT: 222.44 LBS | HEIGHT: 67 IN | SYSTOLIC BLOOD PRESSURE: 120 MMHG | HEART RATE: 68 BPM | RESPIRATION RATE: 16 BRPM | DIASTOLIC BLOOD PRESSURE: 84 MMHG | TEMPERATURE: 98 F | BODY MASS INDEX: 34.91 KG/M2 | OXYGEN SATURATION: 99 %

## 2019-08-06 DIAGNOSIS — R10.32 GROIN PAIN, LEFT: ICD-10-CM

## 2019-08-06 DIAGNOSIS — Z13.220 ENCOUNTER FOR LIPID SCREENING FOR CARDIOVASCULAR DISEASE: ICD-10-CM

## 2019-08-06 DIAGNOSIS — R10.9 LEFT FLANK PAIN: Primary | ICD-10-CM

## 2019-08-06 DIAGNOSIS — E78.00 PURE HYPERCHOLESTEROLEMIA: Primary | ICD-10-CM

## 2019-08-06 DIAGNOSIS — Z13.6 ENCOUNTER FOR LIPID SCREENING FOR CARDIOVASCULAR DISEASE: ICD-10-CM

## 2019-08-06 DIAGNOSIS — R10.9 LEFT FLANK PAIN: ICD-10-CM

## 2019-08-06 LAB
ALBUMIN SERPL BCP-MCNC: 4.1 G/DL (ref 3.5–5.2)
ALP SERPL-CCNC: 84 U/L (ref 55–135)
ALT SERPL W/O P-5'-P-CCNC: 27 U/L (ref 10–44)
ANION GAP SERPL CALC-SCNC: 9 MMOL/L (ref 8–16)
AST SERPL-CCNC: 20 U/L (ref 10–40)
BASOPHILS # BLD AUTO: 0.02 K/UL (ref 0–0.2)
BASOPHILS NFR BLD: 0.3 % (ref 0–1.9)
BILIRUB SERPL-MCNC: 0.5 MG/DL (ref 0.1–1)
BILIRUB SERPL-MCNC: ABNORMAL MG/DL
BILIRUB UR QL STRIP: NEGATIVE
BLOOD URINE, POC: ABNORMAL
BUN SERPL-MCNC: 16 MG/DL (ref 6–20)
CALCIUM SERPL-MCNC: 9.8 MG/DL (ref 8.7–10.5)
CHLORIDE SERPL-SCNC: 104 MMOL/L (ref 95–110)
CHOLEST SERPL-MCNC: 261 MG/DL (ref 120–199)
CHOLEST/HDLC SERPL: 4.4 {RATIO} (ref 2–5)
CLARITY UR: CLEAR
CO2 SERPL-SCNC: 27 MMOL/L (ref 23–29)
COLOR UR: COLORLESS
COLOR, POC UA: YELLOW
CREAT SERPL-MCNC: 1 MG/DL (ref 0.5–1.4)
DIFFERENTIAL METHOD: NORMAL
EOSINOPHIL # BLD AUTO: 0.1 K/UL (ref 0–0.5)
EOSINOPHIL NFR BLD: 0.8 % (ref 0–8)
ERYTHROCYTE [DISTWIDTH] IN BLOOD BY AUTOMATED COUNT: 13.2 % (ref 11.5–14.5)
EST. GFR  (AFRICAN AMERICAN): >60 ML/MIN/1.73 M^2
EST. GFR  (NON AFRICAN AMERICAN): >60 ML/MIN/1.73 M^2
GIANT PLATELETS BLD QL SMEAR: PRESENT
GLUCOSE SERPL-MCNC: 100 MG/DL (ref 70–110)
GLUCOSE UR QL STRIP: NEGATIVE
GLUCOSE UR QL STRIP: NORMAL
HCT VFR BLD AUTO: 45.4 % (ref 40–54)
HDLC SERPL-MCNC: 60 MG/DL (ref 40–75)
HDLC SERPL: 23 % (ref 20–50)
HGB BLD-MCNC: 14.6 G/DL (ref 14–18)
HGB UR QL STRIP: NEGATIVE
KETONES UR QL STRIP: ABNORMAL
KETONES UR QL STRIP: NEGATIVE
LDLC SERPL CALC-MCNC: 184.8 MG/DL (ref 63–159)
LEUKOCYTE ESTERASE UR QL STRIP: NEGATIVE
LEUKOCYTE ESTERASE URINE, POC: ABNORMAL
LYMPHOCYTES # BLD AUTO: 1.6 K/UL (ref 1–4.8)
LYMPHOCYTES NFR BLD: 26.5 % (ref 18–48)
MCH RBC QN AUTO: 29.8 PG (ref 27–31)
MCHC RBC AUTO-ENTMCNC: 32.2 G/DL (ref 32–36)
MCV RBC AUTO: 93 FL (ref 82–98)
MONOCYTES # BLD AUTO: 0.7 K/UL (ref 0.3–1)
MONOCYTES NFR BLD: 10.7 % (ref 4–15)
NEUTROPHILS # BLD AUTO: 3.8 K/UL (ref 1.8–7.7)
NEUTROPHILS NFR BLD: 62 % (ref 38–73)
NITRITE UR QL STRIP: NEGATIVE
NITRITE, POC UA: ABNORMAL
NONHDLC SERPL-MCNC: 201 MG/DL
PH UR STRIP: 7 [PH] (ref 5–8)
PH, POC UA: 6
PLATELET # BLD AUTO: 254 K/UL (ref 150–350)
PLATELET BLD QL SMEAR: NORMAL
PMV BLD AUTO: 10.3 FL (ref 9.2–12.9)
POTASSIUM SERPL-SCNC: 4.4 MMOL/L (ref 3.5–5.1)
PROT SERPL-MCNC: 7.2 G/DL (ref 6–8.4)
PROT UR QL STRIP: NEGATIVE
PROTEIN, POC: ABNORMAL
RBC # BLD AUTO: 4.9 M/UL (ref 4.6–6.2)
SODIUM SERPL-SCNC: 140 MMOL/L (ref 136–145)
SP GR UR STRIP: 1 (ref 1–1.03)
SPECIFIC GRAVITY, POC UA: 1.01
TRIGL SERPL-MCNC: 81 MG/DL (ref 30–150)
TSH SERPL DL<=0.005 MIU/L-ACNC: 1.08 UIU/ML (ref 0.4–4)
URN SPEC COLLECT METH UR: ABNORMAL
UROBILINOGEN UR STRIP-ACNC: NEGATIVE EU/DL
UROBILINOGEN, POC UA: NORMAL
WBC # BLD AUTO: 6.08 K/UL (ref 3.9–12.7)

## 2019-08-06 PROCEDURE — 80053 COMPREHEN METABOLIC PANEL: CPT

## 2019-08-06 PROCEDURE — 99204 PR OFFICE/OUTPT VISIT, NEW, LEVL IV, 45-59 MIN: ICD-10-PCS | Mod: 25,S$GLB,, | Performed by: INTERNAL MEDICINE

## 2019-08-06 PROCEDURE — 85025 COMPLETE CBC W/AUTO DIFF WBC: CPT

## 2019-08-06 PROCEDURE — 81003 URINALYSIS AUTO W/O SCOPE: CPT

## 2019-08-06 PROCEDURE — 3008F PR BODY MASS INDEX (BMI) DOCUMENTED: ICD-10-PCS | Mod: CPTII,S$GLB,, | Performed by: INTERNAL MEDICINE

## 2019-08-06 PROCEDURE — 84443 ASSAY THYROID STIM HORMONE: CPT

## 2019-08-06 PROCEDURE — 3008F BODY MASS INDEX DOCD: CPT | Mod: CPTII,S$GLB,, | Performed by: INTERNAL MEDICINE

## 2019-08-06 PROCEDURE — 87086 URINE CULTURE/COLONY COUNT: CPT

## 2019-08-06 PROCEDURE — 80061 LIPID PANEL: CPT

## 2019-08-06 PROCEDURE — 99999 PR PBB SHADOW E&M-EST. PATIENT-LVL III: CPT | Mod: PBBFAC,,, | Performed by: INTERNAL MEDICINE

## 2019-08-06 PROCEDURE — 99204 OFFICE O/P NEW MOD 45 MIN: CPT | Mod: 25,S$GLB,, | Performed by: INTERNAL MEDICINE

## 2019-08-06 PROCEDURE — 81002 POCT URINE DIPSTICK WITHOUT MICROSCOPE: ICD-10-PCS | Mod: S$GLB,,, | Performed by: INTERNAL MEDICINE

## 2019-08-06 PROCEDURE — 99999 PR PBB SHADOW E&M-EST. PATIENT-LVL III: ICD-10-PCS | Mod: PBBFAC,,, | Performed by: INTERNAL MEDICINE

## 2019-08-06 PROCEDURE — 36415 COLL VENOUS BLD VENIPUNCTURE: CPT | Mod: PO

## 2019-08-06 PROCEDURE — 81002 URINALYSIS NONAUTO W/O SCOPE: CPT | Mod: S$GLB,,, | Performed by: INTERNAL MEDICINE

## 2019-08-06 RX ORDER — CLINDAMYCIN PHOSPHATE 10 UG/ML
1 LOTION TOPICAL DAILY
Refills: 2 | Status: ON HOLD | COMMUNITY
Start: 2019-07-29 | End: 2021-12-19 | Stop reason: HOSPADM

## 2019-08-06 RX ORDER — PRAVASTATIN SODIUM 20 MG/1
20 TABLET ORAL DAILY
Qty: 30 TABLET | Refills: 0 | Status: ON HOLD | OUTPATIENT
Start: 2019-08-06 | End: 2021-12-19 | Stop reason: HOSPADM

## 2019-08-06 NOTE — PROGRESS NOTES
SUBJECTIVE     Chief Complaint   Patient presents with    Pain     Lower left back, groin , and lower left abdominal        HPI  Rafa Crowley is a 49 y.o. male with multiple medical diagnoses as listed in the medical history and problem list that presents for evaluation of L flank x 1.5-2 weeks. Pt reports pain started as a dull pain that has progressed to an achy pain at a 3-4/10 and intermittent in nature with radiation to the L groin and L anterior thigh. Pain is worse with prolonged sitting. Pain is improved with standing. He has not taken any meds for his symptoms. Denies any dysuria, increased urinary frequency, urgency, hematuria, foul smelling urine, fever, chills, night sweats, or unexplained weight loss.     PAST MEDICAL HISTORY:  History reviewed. No pertinent past medical history.    PAST SURGICAL HISTORY:  Past Surgical History:   Procedure Laterality Date    ABDOMINAL SURGERY      EXPLORATORY-LAPAROTOMY N/A 3/3/2017    Performed by [facility] at [facility] OR 2ND FLR    knee scope      LAPAROSCOPY-DIAGNOSTIC N/A 3/3/2017    Performed by [facility] at [facility] OR 2ND [facility]LR    LYSIS-ADHESION N/A 3/3/2017    Performed by [facility] at [facility] OR 2ND FLR       SOCIAL HISTORY:  Social History     Socioeconomic History    Marital status:      Spouse name: Not on file    Number of children: Not on file    Years of education: Not on file    Highest education level: Not on file   Occupational History    Not on file   Social Needs    Financial resource strain: Not on file    Food insecurity:     Worry: Not on file     Inability: Not on file    Transportation needs:     Medical: Not on file     Non-medical: Not on file   Tobacco Use    Smoking status: Never Smoker    Smokeless tobacco: Former User   Substance and Sexual Activity    Alcohol use: Yes     Comment: socially    Drug use: No    Sexual activity: Yes     Partners: Female   Lifestyle    Physical  activity:     Days per week: Not on file     Minutes per session: Not on file    Stress: Not at all   Relationships    Social connections:     Talks on phone: Not on file     Gets together: Not on file     Attends Confucianism service: Not on file     Active member of club or organization: Not on file     Attends meetings of clubs or organizations: Not on file     Relationship status: Not on file   Other Topics Concern    Not on file   Social History Narrative    Not on file       FAMILY HISTORY:  Family History   Problem Relation Age of Onset    Cancer Mother         kidney    Cancer Father         bladder    Cancer Maternal Aunt         kidney       ALLERGIES AND MEDICATIONS: updated and reviewed.  Review of patient's allergies indicates:   Allergen Reactions    Penicillins      Current Outpatient Medications   Medication Sig Dispense Refill    clindamycin (CLEOCIN T) 1 % lotion 1 application once daily. Apply to affected area  2    zolpidem (AMBIEN) 5 MG Tab Take 1 tablet (5 mg total) by mouth nightly as needed. 30 tablet 0     No current facility-administered medications for this visit.        ROS  Review of Systems   Constitutional: Negative for chills and fever.   HENT: Negative for hearing loss and sore throat.    Eyes: Negative for visual disturbance.   Respiratory: Negative for cough and shortness of breath.    Cardiovascular: Negative for chest pain, palpitations and leg swelling.   Gastrointestinal: Negative for abdominal pain, constipation, diarrhea, nausea and vomiting.   Genitourinary: Positive for flank pain (left) and testicular pain. Negative for discharge, dysuria, frequency, hematuria, penile pain, penile swelling, scrotal swelling and urgency.   Musculoskeletal: Negative for arthralgias, joint swelling and myalgias.   Skin: Negative for rash and wound.   Neurological: Negative for headaches.   Psychiatric/Behavioral: Negative for agitation and confusion. The patient is not nervous/anxious.  "         OBJECTIVE     Physical Exam  Vitals:    08/06/19 0959   BP: 120/84   Pulse: 68   Resp: 16   Temp: 97.8 °F (36.6 °C)    Body mass index is 34.84 kg/m².  Weight: 100.9 kg (222 lb 7.1 oz)   Height: 5' 7" (170.2 cm)     Physical Exam   Constitutional: He is oriented to person, place, and time. He appears well-developed and well-nourished. No distress.   HENT:   Head: Normocephalic and atraumatic.   Right Ear: Hearing, tympanic membrane and external ear normal.   Left Ear: Hearing, tympanic membrane and external ear normal.   Nose: Nose normal. No rhinorrhea.   Mouth/Throat: Oropharynx is clear and moist. No uvula swelling. No posterior oropharyngeal edema or posterior oropharyngeal erythema.   Eyes: Conjunctivae and EOM are normal. Right eye exhibits no discharge. Left eye exhibits no discharge. No scleral icterus.   Neck: Normal range of motion. Neck supple. No JVD present. No tracheal deviation present.   Cardiovascular: Normal rate, regular rhythm, normal heart sounds and intact distal pulses. Exam reveals no gallop and no friction rub.   No murmur heard.  Pulmonary/Chest: Effort normal and breath sounds normal. No respiratory distress. He has no wheezes.   Abdominal: Soft. Bowel sounds are normal. He exhibits no distension and no mass. There is no tenderness. There is no rebound, no guarding and no CVA tenderness.   Musculoskeletal: Normal range of motion. He exhibits no edema, tenderness or deformity.   Neurological: He is alert and oriented to person, place, and time. He exhibits normal muscle tone. Coordination normal.   Skin: Skin is warm and dry. No rash noted. No erythema.   Psychiatric: He has a normal mood and affect. His behavior is normal. Judgment and thought content normal.         Health Maintenance       Date Due Completion Date    Lipid Panel 1970 ---    TETANUS VACCINE 03/06/1988 ---    Influenza Vaccine (1) 08/01/2019 ---            ASSESSMENT     49 y.o. male with     1. Left flank " pain    2. Groin pain, left    3. Encounter for lipid screening for cardiovascular disease        PLAN:     1. Left flank pain  - Pt with concerning symptoms for nephrolithiasis; will r/o UTI and check kidney function  - Pt advised to continue adequate fluid hydration with at least 64 oz of water per day; okay to take NSAIDs prn pain  - Urinalysis  - Urine culture  - POCT URINE DIPSTICK WITHOUT MICROSCOPE  - Comprehensive metabolic panel; Future  - CBC auto differential; Future  - TSH; Future  - US Retroperitoneal Complete (Kidney and; Future    2. Groin pain, left  - Likely 2/2 same etiology as above, but will ensure no acute genital pathology with US  - POCT URINE DIPSTICK WITHOUT MICROSCOPE  - US Scrotum And Testicles; Future    3. Encounter for lipid screening for cardiovascular disease  - Lipid panel; Future        RTC in 1-2 weeks as needed for any acute worsening of current condition or failure to improve       Katheryn Espinosa MD  08/06/2019 10:19 AM        No follow-ups on file.

## 2019-08-06 NOTE — LETTER
August 6, 2019      [address] [place] [address] [place] [address]  Phone: [phone]  Fax: [phone]       Patient: Rafa Crowley   YOB: 1970  Date of Visit: 08/06/2019    To Whom It May Concern:    Shanda Crowley  was at [facility] on 08/06/2019. He may return to work/school on 8/6/19 with no restrictions. If you have any questions or concerns, or if I can be of further assistance, please do not hesitate to contact me.    Sincerely,    Katheryn Espinosa MD

## 2019-08-07 ENCOUNTER — TELEPHONE (OUTPATIENT)
Dept: FAMILY MEDICINE | Facility: CLINIC | Age: 49
End: 2019-08-07

## 2019-08-07 NOTE — TELEPHONE ENCOUNTER
Patient would like to know if there is a medication he could take to help break up the kidney stones.  Stated he is still willing to take do the Ultrasound, but also wanted to let Dr. Espinosa know he has not been contacted regarding scheduling.  Please advise.

## 2019-08-07 NOTE — TELEPHONE ENCOUNTER
I suspect kidney stones, but the ultrasound will confirm it. I will not be giving any meds to break up a kidney stone if they are present, but either giving medicine to help pass it or sending to Urology for further evaluation/management. He can call the Radiology department at the hospital to get scheduled or wait for the call from them.

## 2019-08-07 NOTE — TELEPHONE ENCOUNTER
----- Message from Irma Lagunas sent at 8/7/2019 10:50 AM CDT -----  Contact: Self: [phone]  Type: Patient Call Back    Who called:Self    What is the request in detail:Patient called regarding kidney stones/Medication to take.    Can the clinic reply by [facility]? No    Would the patient rather a call back or a response via [facility]? Callback    Best call back number:[phone]    Additional Information:N/A

## 2019-08-08 LAB — BACTERIA UR CULT: NO GROWTH

## 2019-08-21 ENCOUNTER — HOSPITAL ENCOUNTER (OUTPATIENT)
Dept: RADIOLOGY | Facility: HOSPITAL | Age: 49
Discharge: HOME | End: 2019-08-21
Attending: INTERNAL MEDICINE
Payer: COMMERCIAL

## 2019-08-21 DIAGNOSIS — R10.9 LEFT FLANK PAIN: ICD-10-CM

## 2019-08-21 DIAGNOSIS — R10.32 GROIN PAIN, LEFT: ICD-10-CM

## 2019-08-21 PROCEDURE — 76770 US EXAM ABDO BACK WALL COMP: CPT | Mod: 26,,, | Performed by: RADIOLOGY

## 2019-08-21 PROCEDURE — 76770 US EXAM ABDO BACK WALL COMP: CPT | Mod: TC

## 2019-08-21 PROCEDURE — 76770 US RETROPERITONEAL COMPLETE: ICD-10-PCS | Mod: 26,,, | Performed by: RADIOLOGY

## 2019-08-21 PROCEDURE — 76870 US SCROTUM AND TESTICLES: ICD-10-PCS | Mod: 26,,, | Performed by: RADIOLOGY

## 2019-08-21 PROCEDURE — 76870 US EXAM SCROTUM: CPT | Mod: TC

## 2019-08-21 PROCEDURE — 76870 US EXAM SCROTUM: CPT | Mod: 26,,, | Performed by: RADIOLOGY

## 2019-08-22 DIAGNOSIS — N28.89 LEFT KIDNEY MASS: Primary | ICD-10-CM

## 2019-08-23 ENCOUNTER — TELEPHONE (OUTPATIENT)
Dept: FAMILY MEDICINE | Facility: CLINIC | Age: 49
End: 2019-08-23

## 2019-08-23 NOTE — TELEPHONE ENCOUNTER
Return call to Pt, and he states that he had questions regarding the test the Provider ordered and was wondering what other information she had regarding the mass. Pt asked if he would like to come in for a visit with the Provider and she can go over everything with him and why she order additional testing. Pt refused and asked if someone was going to call him to schedule his test. Pt given the number to the scheduling drpartment.

## 2019-08-28 ENCOUNTER — TELEPHONE (OUTPATIENT)
Dept: FAMILY MEDICINE | Facility: CLINIC | Age: 49
End: 2019-08-28

## 2019-08-28 NOTE — TELEPHONE ENCOUNTER
----- Message from Leta Jefferson sent at 8/28/2019 10:11 AM CDT -----  Contact:  [employer]  Name of Who is Calling:  [employer]      What is the request in detail: patient is requesting orders for CT ABD PEL W CONTRAST be faxed over to  [phone] attention:[employer] patient insurance company will cover it 100%      Can the clinic reply by [facility]: no       What Number to Call Back if not in [facility]: [phone]

## 2019-08-28 NOTE — TELEPHONE ENCOUNTER
Return call placed to [facility], and was sent to her voicemail. Message left on voicemail informing her that the Provider wants the reports only for this Pt and all of her Pt's. She doesn't want the disk or the film. Number to the office left if she has any other questions.

## 2019-08-28 NOTE — TELEPHONE ENCOUNTER
----- Message from Becky Heath sent at 8/28/2019  1:49 PM CDT -----  Type: Patient Call Back    Who called: Roberta with [facility]    What is the request in detail: Rep would like to know if Dr. Espinosa would like pt's images on a disk or film and would like to know if she wants them on every pt or just when requested.     Can the clinic reply by [facility]? No     Would the patient rather a call back or a response via [facility]? Call back     Best call back number: [phone]    Additional Information:

## 2019-09-03 ENCOUNTER — TELEPHONE (OUTPATIENT)
Dept: FAMILY MEDICINE | Facility: CLINIC | Age: 49
End: 2019-09-03

## 2019-09-03 DIAGNOSIS — K76.0 FATTY LIVER: ICD-10-CM

## 2019-09-03 NOTE — TELEPHONE ENCOUNTER
----- Message from Chetna Moon sent at 9/3/2019  8:45 AM CDT -----  Contact: self [phone]  .Type: Patient Call Back    Who called: self     What is the request in detail: Pt is requesting results from CT from [facility]     Can the clinic reply by [facility]? Call back     Would the patient rather a call back or a response via [facility]? Call back     Best call back number: [phone]

## 2019-09-03 NOTE — TELEPHONE ENCOUNTER
Please inform pt that he has a mildly fatty liver, so he should eat more healthy and exercise. There are no kidney masses and test is otherwise normal.

## 2019-09-03 NOTE — TELEPHONE ENCOUNTER
----- Message from Cinthia Ortiz sent at 9/3/2019 10:46 AM CDT -----  Contact: pt  Type: Patient Call Back    Who called:pt    What is the request in detail:pt returned the nurse's phone call.leave vm if he doesn't answer. Call pt    Can the clinic reply by [facility]?    Would the patient rather a call back or a response via [facility]? Call back    Best call back number:[phone]    Additional Information:

## 2019-09-04 NOTE — TELEPHONE ENCOUNTER
Unfortunately the CT scan did not yield any findings to explain the groin discomfort. If it persists, I can refer him to Urology for further evaluation.

## 2019-09-04 NOTE — TELEPHONE ENCOUNTER
Patient informed of results.  Patient verbalized understanding, but would like to know how it relates to the pain he felt in his groin.  Please advise.

## 2019-09-04 NOTE — TELEPHONE ENCOUNTER
Let pt know CT scan did not show anything to explain groin discomfort. He says pain is actually gone now but he will call if it comes back to get referral for urology.

## 2020-01-29 ENCOUNTER — OFFICE VISIT (OUTPATIENT)
Dept: SURGERY | Facility: CLINIC | Age: 50
End: 2020-01-29
Payer: COMMERCIAL

## 2020-01-29 VITALS
HEIGHT: 67 IN | HEART RATE: 87 BPM | BODY MASS INDEX: 35 KG/M2 | SYSTOLIC BLOOD PRESSURE: 137 MMHG | TEMPERATURE: 99 F | WEIGHT: 223 LBS | DIASTOLIC BLOOD PRESSURE: 66 MMHG

## 2020-01-29 DIAGNOSIS — R10.33 PERIUMBILICAL PAIN: Primary | ICD-10-CM

## 2020-01-29 PROCEDURE — 99999 PR PBB SHADOW E&M-EST. PATIENT-LVL III: CPT | Mod: PBBFAC,,, | Performed by: SURGERY

## 2020-01-29 PROCEDURE — 99213 PR OFFICE/OUTPT VISIT, EST, LEVL III, 20-29 MIN: ICD-10-PCS | Mod: S$GLB,,, | Performed by: SURGERY

## 2020-01-29 PROCEDURE — 99213 OFFICE O/P EST LOW 20 MIN: CPT | Mod: S$GLB,,, | Performed by: SURGERY

## 2020-01-29 PROCEDURE — 99999 PR PBB SHADOW E&M-EST. PATIENT-LVL III: ICD-10-PCS | Mod: PBBFAC,,, | Performed by: SURGERY

## 2020-01-29 NOTE — PROGRESS NOTES
History & Physical    SUBJECTIVE:     History of Present Illness:  Patient is a 49 y.o. male presents with periumbilical pain. Onset of symptoms was aburpt starting a few days ago with stable course since that time. He is not in any pain today. The pain lasted about a week. It was severe enough to wake him from sleeping, but he did not take any pain medication for it. Past surgical history includes exlap, AC and SBR in 2017. Patient denies abdominal pain, nausea, vomiting, constipation or diarrhea. Symptoms are aggravated by nothing. Symptoms improve with nothing.      Chief Complaint   Patient presents with    Follow-up       Review of patient's allergies indicates:   Allergen Reactions    Penicillins        Current Outpatient Medications   Medication Sig Dispense Refill    clindamycin (CLEOCIN T) 1 % lotion 1 application once daily. Apply to affected area  2    pravastatin (PRAVACHOL) 20 MG tablet Take 1 tablet (20 mg total) by mouth once daily. (Patient not taking: Reported on 1/29/2020) 30 tablet 0    zolpidem (AMBIEN) 5 MG Tab Take 1 tablet (5 mg total) by mouth nightly as needed. 30 tablet 0     No current facility-administered medications for this visit.        History reviewed. No pertinent past medical history.  Past Surgical History:   Procedure Laterality Date    ABDOMINAL SURGERY      knee scope       Family History   Problem Relation Age of Onset    Cancer Mother         kidney    Cancer Father         bladder    Cancer Maternal Aunt         kidney     Social History     Tobacco Use    Smoking status: Never Smoker    Smokeless tobacco: Former User   Substance Use Topics    Alcohol use: Yes     Comment: socially    Drug use: No        Review of Systems:  Review of Systems   Constitutional: Negative for chills and fever.   HENT: Negative for congestion and sneezing.    Eyes: Negative for photophobia and visual disturbance.   Respiratory: Negative for cough and shortness of breath.   "  Cardiovascular: Negative for chest pain and palpitations.   Gastrointestinal: Positive for abdominal pain. Negative for constipation, diarrhea, nausea and vomiting.   Endocrine: Negative for cold intolerance and heat intolerance.   Musculoskeletal: Negative for arthralgias and myalgias.   Skin: Negative for rash and wound.   Psychiatric/Behavioral: Negative for agitation.       OBJECTIVE:     Vital Signs (Most Recent)  Temp: 98.8 °F (37.1 °C) (01/29/20 1425)  Pulse: 87 (01/29/20 1425)  BP: 137/66 (01/29/20 1425)  5' 7" (1.702 m)  101.1 kg (222 lb 15.9 oz)     Physical Exam:  Physical Exam   Constitutional: He is oriented to person, place, and time. He appears well-developed and well-nourished. No distress.   HENT:   Head: Normocephalic and atraumatic.   Eyes: EOM are normal. No scleral icterus.   Neck: Normal range of motion. Neck supple.   Cardiovascular: Normal rate and regular rhythm.   Pulmonary/Chest: Effort normal. No respiratory distress.   Abdominal:   Soft, NTND  Well healed midline ex lap scar  No hernia appreciated   Musculoskeletal: Normal range of motion. He exhibits no edema or tenderness.   Neurological: He is alert and oriented to person, place, and time.   Skin: Skin is warm and dry.   Psychiatric: He has a normal mood and affect.     ASSESSMENT/PLAN:   50 yo male w periumbilical abdominal pain, resolving  -discussed that next step would be CT scan to r/o recurrent/incisional hernia  -as he is currently asymptomatic, he will watch for symptoms to develop or worsen again, at that time we will schedule CT scan  -order placed for abd/pel CT, will wait to schedule  "

## 2020-05-07 DIAGNOSIS — Z12.11 COLON CANCER SCREENING: ICD-10-CM

## 2020-08-14 DIAGNOSIS — Z11.59 NEED FOR HEPATITIS C SCREENING TEST: ICD-10-CM

## 2020-10-05 ENCOUNTER — PATIENT MESSAGE (OUTPATIENT)
Dept: ADMINISTRATIVE | Facility: HOSPITAL | Age: 50
End: 2020-10-05

## 2021-01-05 ENCOUNTER — PATIENT MESSAGE (OUTPATIENT)
Dept: ADMINISTRATIVE | Facility: HOSPITAL | Age: 51
End: 2021-01-05

## 2021-03-01 DIAGNOSIS — R09.81 NASAL CONGESTION: Primary | ICD-10-CM

## 2021-03-20 ENCOUNTER — IMMUNIZATION (OUTPATIENT)
Dept: PRIMARY CARE CLINIC | Facility: CLINIC | Age: 51
End: 2021-03-20
Payer: COMMERCIAL

## 2021-03-20 DIAGNOSIS — Z23 NEED FOR VACCINATION: Primary | ICD-10-CM

## 2021-03-20 PROCEDURE — 91300 PR SARS-COV- 2 COVID-19 VACCINE, NO PRSV, 30MCG/0.3ML, IM: CPT | Mod: S$GLB,,, | Performed by: INTERNAL MEDICINE

## 2021-03-20 PROCEDURE — 91300 PR SARS-COV- 2 COVID-19 VACCINE, NO PRSV, 30MCG/0.3ML, IM: ICD-10-PCS | Mod: S$GLB,,, | Performed by: INTERNAL MEDICINE

## 2021-03-20 PROCEDURE — 0001A PR IMMUNIZ ADMIN, SARS-COV-2 COVID-19 VACC, 30MCG/0.3ML, 1ST DOSE: CPT | Mod: CV19,S$GLB,, | Performed by: INTERNAL MEDICINE

## 2021-03-20 PROCEDURE — 0001A PR IMMUNIZ ADMIN, SARS-COV-2 COVID-19 VACC, 30MCG/0.3ML, 1ST DOSE: ICD-10-PCS | Mod: CV19,S$GLB,, | Performed by: INTERNAL MEDICINE

## 2021-03-20 RX ADMIN — Medication 0.3 ML: at 04:03

## 2021-03-22 ENCOUNTER — PATIENT MESSAGE (OUTPATIENT)
Dept: OTOLARYNGOLOGY | Facility: CLINIC | Age: 51
End: 2021-03-22

## 2021-03-29 ENCOUNTER — LAB VISIT (OUTPATIENT)
Dept: INTERNAL MEDICINE | Facility: CLINIC | Age: 51
End: 2021-03-29
Payer: COMMERCIAL

## 2021-03-29 DIAGNOSIS — R09.81 NASAL CONGESTION: ICD-10-CM

## 2021-03-29 PROCEDURE — U0005 INFEC AGEN DETEC AMPLI PROBE: HCPCS | Performed by: OTOLARYNGOLOGY

## 2021-03-29 PROCEDURE — U0003 INFECTIOUS AGENT DETECTION BY NUCLEIC ACID (DNA OR RNA); SEVERE ACUTE RESPIRATORY SYNDROME CORONAVIRUS 2 (SARS-COV-2) (CORONAVIRUS DISEASE [COVID-19]), AMPLIFIED PROBE TECHNIQUE, MAKING USE OF HIGH THROUGHPUT TECHNOLOGIES AS DESCRIBED BY CMS-2020-01-R: HCPCS | Performed by: OTOLARYNGOLOGY

## 2021-03-30 LAB — SARS-COV-2 RNA RESP QL NAA+PROBE: NOT DETECTED

## 2021-04-01 ENCOUNTER — OFFICE VISIT (OUTPATIENT)
Dept: OTOLARYNGOLOGY | Facility: CLINIC | Age: 51
End: 2021-04-01
Payer: COMMERCIAL

## 2021-04-01 VITALS
TEMPERATURE: 97 F | DIASTOLIC BLOOD PRESSURE: 72 MMHG | BODY MASS INDEX: 35.45 KG/M2 | HEIGHT: 67 IN | WEIGHT: 225.88 LBS | SYSTOLIC BLOOD PRESSURE: 110 MMHG

## 2021-04-01 DIAGNOSIS — T48.5X5A RHINITIS MEDICAMENTOSA: ICD-10-CM

## 2021-04-01 DIAGNOSIS — J31.0 RHINITIS MEDICAMENTOSA: ICD-10-CM

## 2021-04-01 DIAGNOSIS — R09.82 POSTNASAL DRIP: ICD-10-CM

## 2021-04-01 DIAGNOSIS — J30.2 SEASONAL ALLERGIC RHINITIS, UNSPECIFIED TRIGGER: ICD-10-CM

## 2021-04-01 DIAGNOSIS — R09.81 NASAL CONGESTION: Primary | ICD-10-CM

## 2021-04-01 PROCEDURE — 1126F AMNT PAIN NOTED NONE PRSNT: CPT | Mod: S$GLB,,, | Performed by: OTOLARYNGOLOGY

## 2021-04-01 PROCEDURE — 31231 PR NASAL ENDOSCOPY, DX: ICD-10-PCS | Mod: S$GLB,,, | Performed by: OTOLARYNGOLOGY

## 2021-04-01 PROCEDURE — 3008F BODY MASS INDEX DOCD: CPT | Mod: CPTII,S$GLB,, | Performed by: OTOLARYNGOLOGY

## 2021-04-01 PROCEDURE — 99204 OFFICE O/P NEW MOD 45 MIN: CPT | Mod: 25,S$GLB,, | Performed by: OTOLARYNGOLOGY

## 2021-04-01 PROCEDURE — 1126F PR PAIN SEVERITY QUANTIFIED, NO PAIN PRESENT: ICD-10-PCS | Mod: S$GLB,,, | Performed by: OTOLARYNGOLOGY

## 2021-04-01 PROCEDURE — 3008F PR BODY MASS INDEX (BMI) DOCUMENTED: ICD-10-PCS | Mod: CPTII,S$GLB,, | Performed by: OTOLARYNGOLOGY

## 2021-04-01 PROCEDURE — 31231 NASAL ENDOSCOPY DX: CPT | Mod: S$GLB,,, | Performed by: OTOLARYNGOLOGY

## 2021-04-01 PROCEDURE — 99204 PR OFFICE/OUTPT VISIT, NEW, LEVL IV, 45-59 MIN: ICD-10-PCS | Mod: 25,S$GLB,, | Performed by: OTOLARYNGOLOGY

## 2021-04-06 ENCOUNTER — PATIENT MESSAGE (OUTPATIENT)
Dept: ADMINISTRATIVE | Facility: HOSPITAL | Age: 51
End: 2021-04-06

## 2021-04-11 ENCOUNTER — IMMUNIZATION (OUTPATIENT)
Dept: PRIMARY CARE CLINIC | Facility: CLINIC | Age: 51
End: 2021-04-11
Payer: COMMERCIAL

## 2021-04-11 DIAGNOSIS — Z23 NEED FOR VACCINATION: Primary | ICD-10-CM

## 2021-04-11 PROCEDURE — 91300 PR SARS-COV- 2 COVID-19 VACCINE, NO PRSV, 30MCG/0.3ML, IM: ICD-10-PCS | Mod: S$GLB,,, | Performed by: INTERNAL MEDICINE

## 2021-04-11 PROCEDURE — 0002A PR IMMUNIZ ADMIN, SARS-COV-2 COVID-19 VACC, 30MCG/0.3ML, 2ND DOSE: CPT | Mod: CV19,S$GLB,, | Performed by: INTERNAL MEDICINE

## 2021-04-11 PROCEDURE — 0002A PR IMMUNIZ ADMIN, SARS-COV-2 COVID-19 VACC, 30MCG/0.3ML, 2ND DOSE: ICD-10-PCS | Mod: CV19,S$GLB,, | Performed by: INTERNAL MEDICINE

## 2021-04-11 PROCEDURE — 91300 PR SARS-COV- 2 COVID-19 VACCINE, NO PRSV, 30MCG/0.3ML, IM: CPT | Mod: S$GLB,,, | Performed by: INTERNAL MEDICINE

## 2021-04-11 RX ADMIN — Medication 0.3 ML: at 01:04

## 2021-07-07 ENCOUNTER — PATIENT MESSAGE (OUTPATIENT)
Dept: ADMINISTRATIVE | Facility: HOSPITAL | Age: 51
End: 2021-07-07

## 2021-08-12 ENCOUNTER — TELEPHONE (OUTPATIENT)
Dept: PULMONOLOGY | Facility: CLINIC | Age: 51
End: 2021-08-12

## 2021-10-04 ENCOUNTER — PATIENT MESSAGE (OUTPATIENT)
Dept: ADMINISTRATIVE | Facility: HOSPITAL | Age: 51
End: 2021-10-04

## 2021-12-17 ENCOUNTER — HOSPITAL ENCOUNTER (INPATIENT)
Facility: HOSPITAL | Age: 51
LOS: 2 days | Discharge: HOME | DRG: 390 | End: 2021-12-19
Attending: EMERGENCY MEDICINE | Admitting: SURGERY
Payer: COMMERCIAL

## 2021-12-17 DIAGNOSIS — R11.0 NAUSEA: ICD-10-CM

## 2021-12-17 DIAGNOSIS — K56.600 PARTIAL SMALL BOWEL OBSTRUCTION: Primary | ICD-10-CM

## 2021-12-17 DIAGNOSIS — Z46.59 ENCOUNTER FOR FEEDING TUBE PLACEMENT: ICD-10-CM

## 2021-12-17 DIAGNOSIS — R14.0 ABDOMINAL DISTENSION: ICD-10-CM

## 2021-12-17 DIAGNOSIS — R11.10 EMESIS: ICD-10-CM

## 2021-12-17 LAB
ALBUMIN SERPL-MCNC: 4 G/DL (ref 3.3–5.5)
ALBUMIN SERPL-MCNC: 4 G/DL (ref 3.3–5.5)
ALLENS TEST: ABNORMAL
ALP SERPL-CCNC: 88 U/L (ref 42–141)
ALP SERPL-CCNC: 90 U/L (ref 42–141)
BILIRUB SERPL-MCNC: 0.7 MG/DL (ref 0.2–1.6)
BILIRUB SERPL-MCNC: 0.7 MG/DL (ref 0.2–1.6)
BILIRUBIN, POC UA: NEGATIVE
BLOOD, POC UA: ABNORMAL
BUN SERPL-MCNC: 12 MG/DL (ref 7–22)
CALCIUM SERPL-MCNC: 9.6 MG/DL (ref 8–10.3)
CHLORIDE SERPL-SCNC: 105 MMOL/L (ref 98–108)
CLARITY, POC UA: CLEAR
COLOR, POC UA: YELLOW
CREAT SERPL-MCNC: 1.2 MG/DL (ref 0.6–1.2)
CTP QC/QA: YES
GLUCOSE SERPL-MCNC: 135 MG/DL (ref 73–118)
GLUCOSE, POC UA: NEGATIVE
HCO3 UR-SCNC: 27.8 MMOL/L (ref 24–28)
KETONES, POC UA: NEGATIVE
LDH SERPL L TO P-CCNC: 0.81 MMOL/L (ref 0.5–2.2)
LEUKOCYTE EST, POC UA: NEGATIVE
NITRITE, POC UA: NEGATIVE
PCO2 BLDA: 47.8 MMHG (ref 35–45)
PH SMN: 7.37 [PH] (ref 7.35–7.45)
PH UR STRIP: 5.5 [PH]
PO2 BLDA: 24 MMHG (ref 40–60)
POC ALT (SGPT): 38 U/L (ref 10–47)
POC ALT (SGPT): 39 U/L (ref 10–47)
POC AMYLASE: 23 U/L (ref 14–97)
POC AST (SGOT): 26 U/L (ref 11–38)
POC AST (SGOT): 29 U/L (ref 11–38)
POC B-TYPE NATRIURETIC PEPTIDE: 7.4 PG/ML (ref 0–100)
POC BE: 2 MMOL/L
POC CARDIAC TROPONIN I: 0 NG/ML
POC GGT: 59 U/L (ref 5–65)
POC SATURATED O2: 41 % (ref 95–100)
POC TCO2: 28 MMOL/L (ref 18–33)
POC TCO2: 29 MMOL/L (ref 24–29)
POTASSIUM BLD-SCNC: 4.2 MMOL/L (ref 3.6–5.1)
PROTEIN, POC UA: NEGATIVE
PROTEIN, POC: 7.2 G/DL (ref 6.4–8.1)
PROTEIN, POC: 7.2 G/DL (ref 6.4–8.1)
SAMPLE: ABNORMAL
SAMPLE: NORMAL
SARS-COV-2 RDRP RESP QL NAA+PROBE: NEGATIVE
SITE: ABNORMAL
SODIUM BLD-SCNC: 148 MMOL/L (ref 128–145)
SPECIFIC GRAVITY, POC UA: <=1.005
UROBILINOGEN, POC UA: 0.2 E.U./DL

## 2021-12-17 PROCEDURE — 82040 ASSAY OF SERUM ALBUMIN: CPT | Mod: ER

## 2021-12-17 PROCEDURE — U0002 COVID-19 LAB TEST NON-CDC: HCPCS | Mod: ER | Performed by: EMERGENCY MEDICINE

## 2021-12-17 PROCEDURE — 85025 COMPLETE CBC W/AUTO DIFF WBC: CPT | Mod: ER

## 2021-12-17 PROCEDURE — 81003 URINALYSIS AUTO W/O SCOPE: CPT | Mod: ER

## 2021-12-17 PROCEDURE — 63600175 PHARM REV CODE 636 W HCPCS: Mod: ER | Performed by: EMERGENCY MEDICINE

## 2021-12-17 PROCEDURE — 96375 TX/PRO/DX INJ NEW DRUG ADDON: CPT | Mod: ER

## 2021-12-17 PROCEDURE — 93010 ELECTROCARDIOGRAM REPORT: CPT | Mod: ,,, | Performed by: INTERNAL MEDICINE

## 2021-12-17 PROCEDURE — 99285 EMERGENCY DEPT VISIT HI MDM: CPT | Mod: 25,ER

## 2021-12-17 PROCEDURE — 93005 ELECTROCARDIOGRAM TRACING: CPT | Mod: ER

## 2021-12-17 PROCEDURE — 93010 EKG 12-LEAD: ICD-10-PCS | Mod: ,,, | Performed by: INTERNAL MEDICINE

## 2021-12-17 PROCEDURE — 83880 ASSAY OF NATRIURETIC PEPTIDE: CPT | Mod: ER

## 2021-12-17 PROCEDURE — 82803 BLOOD GASES ANY COMBINATION: CPT | Mod: ER

## 2021-12-17 PROCEDURE — 80053 COMPREHEN METABOLIC PANEL: CPT | Mod: ER

## 2021-12-17 PROCEDURE — 25000003 PHARM REV CODE 250: Performed by: SURGERY

## 2021-12-17 PROCEDURE — 96374 THER/PROPH/DIAG INJ IV PUSH: CPT | Mod: ER

## 2021-12-17 PROCEDURE — 25000003 PHARM REV CODE 250: Mod: ER | Performed by: EMERGENCY MEDICINE

## 2021-12-17 PROCEDURE — 63600175 PHARM REV CODE 636 W HCPCS

## 2021-12-17 PROCEDURE — 11000001 HC ACUTE MED/SURG PRIVATE ROOM

## 2021-12-17 PROCEDURE — 84484 ASSAY OF TROPONIN QUANT: CPT | Mod: ER

## 2021-12-17 PROCEDURE — 25500020 PHARM REV CODE 255: Mod: ER | Performed by: EMERGENCY MEDICINE

## 2021-12-17 PROCEDURE — 96361 HYDRATE IV INFUSION ADD-ON: CPT | Mod: ER

## 2021-12-17 RX ORDER — MORPHINE SULFATE 4 MG/ML
4 INJECTION, SOLUTION INTRAMUSCULAR; INTRAVENOUS
Status: COMPLETED | OUTPATIENT
Start: 2021-12-17 | End: 2021-12-17

## 2021-12-17 RX ORDER — ONDANSETRON 2 MG/ML
8 INJECTION INTRAMUSCULAR; INTRAVENOUS
Status: COMPLETED | OUTPATIENT
Start: 2021-12-17 | End: 2021-12-17

## 2021-12-17 RX ORDER — ONDANSETRON 2 MG/ML
8 INJECTION INTRAMUSCULAR; INTRAVENOUS EVERY 8 HOURS PRN
Status: DISCONTINUED | OUTPATIENT
Start: 2021-12-17 | End: 2021-12-19 | Stop reason: HOSPADM

## 2021-12-17 RX ORDER — MORPHINE SULFATE 4 MG/ML
2 INJECTION, SOLUTION INTRAMUSCULAR; INTRAVENOUS EVERY 4 HOURS PRN
Status: DISCONTINUED | OUTPATIENT
Start: 2021-12-17 | End: 2021-12-19 | Stop reason: HOSPADM

## 2021-12-17 RX ORDER — PANTOPRAZOLE SODIUM 40 MG/10ML
40 INJECTION, POWDER, LYOPHILIZED, FOR SOLUTION INTRAVENOUS DAILY
Status: DISCONTINUED | OUTPATIENT
Start: 2021-12-18 | End: 2021-12-19 | Stop reason: HOSPADM

## 2021-12-17 RX ORDER — SODIUM CHLORIDE 9 MG/ML
INJECTION, SOLUTION INTRAVENOUS CONTINUOUS
Status: DISCONTINUED | OUTPATIENT
Start: 2021-12-17 | End: 2021-12-19 | Stop reason: HOSPADM

## 2021-12-17 RX ORDER — ENOXAPARIN SODIUM 100 MG/ML
40 INJECTION SUBCUTANEOUS EVERY 24 HOURS
Status: DISCONTINUED | OUTPATIENT
Start: 2021-12-17 | End: 2021-12-19 | Stop reason: HOSPADM

## 2021-12-17 RX ORDER — MORPHINE SULFATE 4 MG/ML
8 INJECTION, SOLUTION INTRAMUSCULAR; INTRAVENOUS
Status: COMPLETED | OUTPATIENT
Start: 2021-12-17 | End: 2021-12-17

## 2021-12-17 RX ORDER — LIDOCAINE HYDROCHLORIDE 20 MG/ML
15 SOLUTION OROPHARYNGEAL ONCE
Status: DISCONTINUED | OUTPATIENT
Start: 2021-12-17 | End: 2021-12-17

## 2021-12-17 RX ORDER — LIDOCAINE HYDROCHLORIDE 20 MG/ML
10 SOLUTION OROPHARYNGEAL
Status: COMPLETED | OUTPATIENT
Start: 2021-12-17 | End: 2021-12-17

## 2021-12-17 RX ORDER — LIDOCAINE HYDROCHLORIDE 20 MG/ML
15 SOLUTION OROPHARYNGEAL ONCE
Status: COMPLETED | OUTPATIENT
Start: 2021-12-17 | End: 2021-12-17

## 2021-12-17 RX ORDER — DICYCLOMINE HYDROCHLORIDE 10 MG/ML
20 INJECTION INTRAMUSCULAR
Status: DISCONTINUED | OUTPATIENT
Start: 2021-12-17 | End: 2021-12-17

## 2021-12-17 RX ADMIN — MORPHINE SULFATE 2 MG: 4 INJECTION, SOLUTION INTRAMUSCULAR; INTRAVENOUS at 11:12

## 2021-12-17 RX ADMIN — SODIUM CHLORIDE 1000 ML: 0.9 INJECTION, SOLUTION INTRAVENOUS at 04:12

## 2021-12-17 RX ADMIN — IOHEXOL 100 ML: 350 INJECTION, SOLUTION INTRAVENOUS at 05:12

## 2021-12-17 RX ADMIN — ONDANSETRON 8 MG: 2 INJECTION INTRAMUSCULAR; INTRAVENOUS at 04:12

## 2021-12-17 RX ADMIN — MORPHINE SULFATE 4 MG: 4 INJECTION INTRAVENOUS at 12:12

## 2021-12-17 RX ADMIN — LIDOCAINE HYDROCHLORIDE 15 ML: 20 SOLUTION ORAL; TOPICAL at 09:12

## 2021-12-17 RX ADMIN — SODIUM CHLORIDE: 0.9 INJECTION, SOLUTION INTRAVENOUS at 06:12

## 2021-12-17 RX ADMIN — ENOXAPARIN SODIUM 40 MG: 100 INJECTION SUBCUTANEOUS at 05:12

## 2021-12-17 RX ADMIN — SODIUM CHLORIDE: 0.9 INJECTION, SOLUTION INTRAVENOUS at 11:12

## 2021-12-17 RX ADMIN — LIDOCAINE HYDROCHLORIDE 10 ML: 20 SOLUTION ORAL; TOPICAL at 06:12

## 2021-12-17 RX ADMIN — MORPHINE SULFATE 8 MG: 4 INJECTION INTRAVENOUS at 04:12

## 2021-12-18 LAB
ANION GAP SERPL CALC-SCNC: 10 MMOL/L (ref 8–16)
BACTERIA #/AREA URNS HPF: NORMAL /HPF
BASOPHILS # BLD AUTO: 0.01 K/UL (ref 0–0.2)
BASOPHILS NFR BLD: 0.2 % (ref 0–1.9)
BILIRUB UR QL STRIP: NEGATIVE
BUN SERPL-MCNC: 16 MG/DL (ref 6–20)
CALCIUM SERPL-MCNC: 8.7 MG/DL (ref 8.7–10.5)
CHLORIDE SERPL-SCNC: 103 MMOL/L (ref 95–110)
CLARITY UR: CLEAR
CO2 SERPL-SCNC: 25 MMOL/L (ref 23–29)
COLOR UR: YELLOW
CREAT SERPL-MCNC: 0.9 MG/DL (ref 0.5–1.4)
DIFFERENTIAL METHOD: ABNORMAL
EOSINOPHIL # BLD AUTO: 0 K/UL (ref 0–0.5)
EOSINOPHIL NFR BLD: 0.7 % (ref 0–8)
ERYTHROCYTE [DISTWIDTH] IN BLOOD BY AUTOMATED COUNT: 13.1 % (ref 11.5–14.5)
EST. GFR  (AFRICAN AMERICAN): >60 ML/MIN/1.73 M^2
EST. GFR  (NON AFRICAN AMERICAN): >60 ML/MIN/1.73 M^2
GLUCOSE SERPL-MCNC: 99 MG/DL (ref 70–110)
GLUCOSE UR QL STRIP: ABNORMAL
HCT VFR BLD AUTO: 44.3 % (ref 40–54)
HGB BLD-MCNC: 14.4 G/DL (ref 14–18)
HGB UR QL STRIP: NEGATIVE
HYALINE CASTS #/AREA URNS LPF: 0 /LPF
IMM GRANULOCYTES # BLD AUTO: 0.01 K/UL (ref 0–0.04)
IMM GRANULOCYTES NFR BLD AUTO: 0.2 % (ref 0–0.5)
KETONES UR QL STRIP: ABNORMAL
LEUKOCYTE ESTERASE UR QL STRIP: NEGATIVE
LYMPHOCYTES # BLD AUTO: 1.1 K/UL (ref 1–4.8)
LYMPHOCYTES NFR BLD: 25.4 % (ref 18–48)
MAGNESIUM SERPL-MCNC: 1.9 MG/DL (ref 1.6–2.6)
MCH RBC QN AUTO: 29.6 PG (ref 27–31)
MCHC RBC AUTO-ENTMCNC: 32.5 G/DL (ref 32–36)
MCV RBC AUTO: 91 FL (ref 82–98)
MICROSCOPIC COMMENT: NORMAL
MONOCYTES # BLD AUTO: 0.8 K/UL (ref 0.3–1)
MONOCYTES NFR BLD: 17.4 % (ref 4–15)
NEUTROPHILS # BLD AUTO: 2.5 K/UL (ref 1.8–7.7)
NEUTROPHILS NFR BLD: 56.1 % (ref 38–73)
NITRITE UR QL STRIP: NEGATIVE
NRBC BLD-RTO: 0 /100 WBC
PH UR STRIP: 6 [PH] (ref 5–8)
PHOSPHATE SERPL-MCNC: 3 MG/DL (ref 2.7–4.5)
PLATELET # BLD AUTO: 190 K/UL (ref 150–450)
PMV BLD AUTO: 10.5 FL (ref 9.2–12.9)
POTASSIUM SERPL-SCNC: 3.8 MMOL/L (ref 3.5–5.1)
PROT UR QL STRIP: ABNORMAL
RBC # BLD AUTO: 4.87 M/UL (ref 4.6–6.2)
RBC #/AREA URNS HPF: 2 /HPF (ref 0–4)
SODIUM SERPL-SCNC: 138 MMOL/L (ref 136–145)
SP GR UR STRIP: >1.03 (ref 1–1.03)
URN SPEC COLLECT METH UR: ABNORMAL
UROBILINOGEN UR STRIP-ACNC: ABNORMAL EU/DL
WBC # BLD AUTO: 4.49 K/UL (ref 3.9–12.7)
WBC #/AREA URNS HPF: 0 /HPF (ref 0–5)

## 2021-12-18 PROCEDURE — 25500020 PHARM REV CODE 255: Performed by: SURGERY

## 2021-12-18 PROCEDURE — 11000001 HC ACUTE MED/SURG PRIVATE ROOM

## 2021-12-18 PROCEDURE — 99223 PR INITIAL HOSPITAL CARE,LEVL III: ICD-10-PCS | Mod: ,,, | Performed by: SURGERY

## 2021-12-18 PROCEDURE — 99232 PR SUBSEQUENT HOSPITAL CARE,LEVL II: ICD-10-PCS | Mod: ,,, | Performed by: SURGERY

## 2021-12-18 PROCEDURE — 80048 BASIC METABOLIC PNL TOTAL CA: CPT

## 2021-12-18 PROCEDURE — 99900035 HC TECH TIME PER 15 MIN (STAT)

## 2021-12-18 PROCEDURE — 99223 1ST HOSP IP/OBS HIGH 75: CPT | Mod: ,,, | Performed by: SURGERY

## 2021-12-18 PROCEDURE — 81000 URINALYSIS NONAUTO W/SCOPE: CPT | Performed by: STUDENT IN AN ORGANIZED HEALTH CARE EDUCATION/TRAINING PROGRAM

## 2021-12-18 PROCEDURE — 99232 SBSQ HOSP IP/OBS MODERATE 35: CPT | Mod: ,,, | Performed by: SURGERY

## 2021-12-18 PROCEDURE — 83735 ASSAY OF MAGNESIUM: CPT

## 2021-12-18 PROCEDURE — 25000003 PHARM REV CODE 250: Performed by: SURGERY

## 2021-12-18 PROCEDURE — 84100 ASSAY OF PHOSPHORUS: CPT

## 2021-12-18 PROCEDURE — 85025 COMPLETE CBC W/AUTO DIFF WBC: CPT

## 2021-12-18 PROCEDURE — 63600175 PHARM REV CODE 636 W HCPCS

## 2021-12-18 PROCEDURE — 36415 COLL VENOUS BLD VENIPUNCTURE: CPT

## 2021-12-18 PROCEDURE — C9113 INJ PANTOPRAZOLE SODIUM, VIA: HCPCS

## 2021-12-18 RX ADMIN — ONDANSETRON 8 MG: 2 INJECTION INTRAMUSCULAR; INTRAVENOUS at 11:12

## 2021-12-18 RX ADMIN — SODIUM CHLORIDE: 0.9 INJECTION, SOLUTION INTRAVENOUS at 06:12

## 2021-12-18 RX ADMIN — SODIUM CHLORIDE: 0.9 INJECTION, SOLUTION INTRAVENOUS at 10:12

## 2021-12-18 RX ADMIN — DIATRIZOATE MEGLUMINE AND DIATRIZOATE SODIUM 240 ML: 660; 100 LIQUID ORAL; RECTAL at 11:12

## 2021-12-18 RX ADMIN — ENOXAPARIN SODIUM 40 MG: 100 INJECTION SUBCUTANEOUS at 05:12

## 2021-12-18 RX ADMIN — PANTOPRAZOLE SODIUM 40 MG: 40 INJECTION, POWDER, FOR SOLUTION INTRAVENOUS at 08:12

## 2021-12-19 VITALS
RESPIRATION RATE: 20 BRPM | TEMPERATURE: 98 F | DIASTOLIC BLOOD PRESSURE: 72 MMHG | BODY MASS INDEX: 35.05 KG/M2 | HEIGHT: 67 IN | SYSTOLIC BLOOD PRESSURE: 121 MMHG | WEIGHT: 223.31 LBS | OXYGEN SATURATION: 98 % | HEART RATE: 63 BPM

## 2021-12-19 PROBLEM — K56.600 PARTIAL SMALL BOWEL OBSTRUCTION: Status: ACTIVE | Noted: 2017-03-02

## 2021-12-19 PROBLEM — K56.600 PARTIAL SMALL BOWEL OBSTRUCTION: Status: RESOLVED | Noted: 2017-03-02 | Resolved: 2021-12-19

## 2021-12-19 LAB
ANION GAP SERPL CALC-SCNC: 10 MMOL/L (ref 8–16)
BASOPHILS # BLD AUTO: 0.01 K/UL (ref 0–0.2)
BASOPHILS NFR BLD: 0.2 % (ref 0–1.9)
BUN SERPL-MCNC: 17 MG/DL (ref 6–20)
CALCIUM SERPL-MCNC: 8.3 MG/DL (ref 8.7–10.5)
CHLORIDE SERPL-SCNC: 106 MMOL/L (ref 95–110)
CO2 SERPL-SCNC: 23 MMOL/L (ref 23–29)
CREAT SERPL-MCNC: 0.8 MG/DL (ref 0.5–1.4)
DIFFERENTIAL METHOD: ABNORMAL
EOSINOPHIL # BLD AUTO: 0.1 K/UL (ref 0–0.5)
EOSINOPHIL NFR BLD: 1.3 % (ref 0–8)
ERYTHROCYTE [DISTWIDTH] IN BLOOD BY AUTOMATED COUNT: 13 % (ref 11.5–14.5)
EST. GFR  (AFRICAN AMERICAN): >60 ML/MIN/1.73 M^2
EST. GFR  (NON AFRICAN AMERICAN): >60 ML/MIN/1.73 M^2
GLUCOSE SERPL-MCNC: 89 MG/DL (ref 70–110)
HCT VFR BLD AUTO: 40.2 % (ref 40–54)
HGB BLD-MCNC: 13 G/DL (ref 14–18)
IMM GRANULOCYTES # BLD AUTO: 0.01 K/UL (ref 0–0.04)
IMM GRANULOCYTES NFR BLD AUTO: 0.2 % (ref 0–0.5)
LYMPHOCYTES # BLD AUTO: 1.1 K/UL (ref 1–4.8)
LYMPHOCYTES NFR BLD: 24.8 % (ref 18–48)
MAGNESIUM SERPL-MCNC: 2 MG/DL (ref 1.6–2.6)
MCH RBC QN AUTO: 29.6 PG (ref 27–31)
MCHC RBC AUTO-ENTMCNC: 32.3 G/DL (ref 32–36)
MCV RBC AUTO: 92 FL (ref 82–98)
MONOCYTES # BLD AUTO: 0.6 K/UL (ref 0.3–1)
MONOCYTES NFR BLD: 13.2 % (ref 4–15)
NEUTROPHILS # BLD AUTO: 2.7 K/UL (ref 1.8–7.7)
NEUTROPHILS NFR BLD: 60.3 % (ref 38–73)
NRBC BLD-RTO: 0 /100 WBC
PHOSPHATE SERPL-MCNC: 2.1 MG/DL (ref 2.7–4.5)
PLATELET # BLD AUTO: 153 K/UL (ref 150–450)
PMV BLD AUTO: 10.5 FL (ref 9.2–12.9)
POTASSIUM SERPL-SCNC: 3.8 MMOL/L (ref 3.5–5.1)
RBC # BLD AUTO: 4.39 M/UL (ref 4.6–6.2)
SODIUM SERPL-SCNC: 139 MMOL/L (ref 136–145)
WBC # BLD AUTO: 4.47 K/UL (ref 3.9–12.7)

## 2021-12-19 PROCEDURE — C9113 INJ PANTOPRAZOLE SODIUM, VIA: HCPCS

## 2021-12-19 PROCEDURE — 85025 COMPLETE CBC W/AUTO DIFF WBC: CPT

## 2021-12-19 PROCEDURE — 84100 ASSAY OF PHOSPHORUS: CPT

## 2021-12-19 PROCEDURE — 25000003 PHARM REV CODE 250: Performed by: SURGERY

## 2021-12-19 PROCEDURE — 83735 ASSAY OF MAGNESIUM: CPT

## 2021-12-19 PROCEDURE — 36415 COLL VENOUS BLD VENIPUNCTURE: CPT

## 2021-12-19 PROCEDURE — 63600175 PHARM REV CODE 636 W HCPCS

## 2021-12-19 PROCEDURE — 80048 BASIC METABOLIC PNL TOTAL CA: CPT

## 2021-12-19 RX ADMIN — PANTOPRAZOLE SODIUM 40 MG: 40 INJECTION, POWDER, FOR SOLUTION INTRAVENOUS at 10:12

## 2021-12-19 RX ADMIN — SODIUM CHLORIDE: 0.9 INJECTION, SOLUTION INTRAVENOUS at 03:12

## 2021-12-20 ENCOUNTER — NURSE TRIAGE (OUTPATIENT)
Dept: ADMINISTRATIVE | Facility: CLINIC | Age: 51
End: 2021-12-20
Payer: COMMERCIAL

## 2021-12-21 ENCOUNTER — TELEPHONE (OUTPATIENT)
Dept: SURGERY | Facility: CLINIC | Age: 51
End: 2021-12-21
Payer: COMMERCIAL

## 2021-12-21 ENCOUNTER — OFFICE VISIT (OUTPATIENT)
Dept: GASTROENTEROLOGY | Facility: CLINIC | Age: 51
End: 2021-12-21
Payer: COMMERCIAL

## 2021-12-21 VITALS
BODY MASS INDEX: 34.29 KG/M2 | WEIGHT: 218.5 LBS | DIASTOLIC BLOOD PRESSURE: 86 MMHG | SYSTOLIC BLOOD PRESSURE: 142 MMHG | HEIGHT: 67 IN | HEART RATE: 61 BPM

## 2021-12-21 DIAGNOSIS — Z12.11 COLON CANCER SCREENING: ICD-10-CM

## 2021-12-21 DIAGNOSIS — K56.609 SMALL BOWEL OBSTRUCTION: Primary | ICD-10-CM

## 2021-12-21 PROCEDURE — 99203 OFFICE O/P NEW LOW 30 MIN: CPT | Mod: S$GLB,,, | Performed by: INTERNAL MEDICINE

## 2021-12-21 PROCEDURE — 99999 PR PBB SHADOW E&M-EST. PATIENT-LVL III: CPT | Mod: PBBFAC,,, | Performed by: INTERNAL MEDICINE

## 2021-12-21 PROCEDURE — 99999 PR PBB SHADOW E&M-EST. PATIENT-LVL III: ICD-10-PCS | Mod: PBBFAC,,, | Performed by: INTERNAL MEDICINE

## 2021-12-21 PROCEDURE — 99203 PR OFFICE/OUTPT VISIT, NEW, LEVL III, 30-44 MIN: ICD-10-PCS | Mod: S$GLB,,, | Performed by: INTERNAL MEDICINE

## 2021-12-23 ENCOUNTER — TELEPHONE (OUTPATIENT)
Dept: SURGERY | Facility: CLINIC | Age: 51
End: 2021-12-23
Payer: COMMERCIAL

## 2021-12-23 ENCOUNTER — TELEPHONE (OUTPATIENT)
Dept: ENDOSCOPY | Facility: HOSPITAL | Age: 51
End: 2021-12-23
Payer: COMMERCIAL

## 2021-12-23 ENCOUNTER — TELEPHONE (OUTPATIENT)
Dept: FAMILY MEDICINE | Facility: CLINIC | Age: 51
End: 2021-12-23
Payer: COMMERCIAL

## 2021-12-23 NOTE — TELEPHONE ENCOUNTER
----- Message from Chetna Moon sent at 12/23/2021  3:38 PM CST -----  Regarding: self [phone]  .Type: Patient Call Back    Who called: self     What is the request in detail: Pt stated that he's having a lot of gas since having the tube placed up his nose and requesting a rx to help resolve the matter. Pt stated that the gas is so heavy that his stomach feels like it's about to explode   .  [id number] [place] - [address]  [address] [place] [address]  Phone: [phone] Fax: [phone]    Can the clinic reply by [facility]? Call back     Would the patient rather a call back or a response via [facility]?  Call back     Best call back number: [phone]

## 2021-12-24 ENCOUNTER — HOSPITAL ENCOUNTER (EMERGENCY)
Facility: HOSPITAL | Age: 51
Discharge: HOME | End: 2021-12-24
Attending: EMERGENCY MEDICINE
Payer: COMMERCIAL

## 2021-12-24 ENCOUNTER — HOSPITAL ENCOUNTER (EMERGENCY)
Facility: HOSPITAL | Age: 51
Discharge: LEFT BEFORE DISCHARGE | End: 2021-12-24
Attending: INTERNAL MEDICINE
Payer: COMMERCIAL

## 2021-12-24 VITALS
HEART RATE: 76 BPM | BODY MASS INDEX: 34.53 KG/M2 | DIASTOLIC BLOOD PRESSURE: 72 MMHG | SYSTOLIC BLOOD PRESSURE: 122 MMHG | OXYGEN SATURATION: 99 % | RESPIRATION RATE: 14 BRPM | HEIGHT: 67 IN | TEMPERATURE: 98 F | WEIGHT: 220 LBS

## 2021-12-24 VITALS
HEART RATE: 64 BPM | BODY MASS INDEX: 33.74 KG/M2 | RESPIRATION RATE: 16 BRPM | DIASTOLIC BLOOD PRESSURE: 73 MMHG | WEIGHT: 215 LBS | HEIGHT: 67 IN | SYSTOLIC BLOOD PRESSURE: 126 MMHG | OXYGEN SATURATION: 100 % | TEMPERATURE: 98 F

## 2021-12-24 DIAGNOSIS — R10.9 ABDOMINAL PAIN: ICD-10-CM

## 2021-12-24 DIAGNOSIS — R19.7 DIARRHEA: ICD-10-CM

## 2021-12-24 DIAGNOSIS — R19.7 DIARRHEA, UNSPECIFIED TYPE: Primary | ICD-10-CM

## 2021-12-24 DIAGNOSIS — K56.609 SMALL BOWEL OBSTRUCTION: ICD-10-CM

## 2021-12-24 LAB
ALBUMIN SERPL-MCNC: 3.5 G/DL (ref 3.3–5.5)
ALP SERPL-CCNC: 91 U/L (ref 42–141)
BILIRUB SERPL-MCNC: 0.6 MG/DL (ref 0.2–1.6)
BILIRUBIN, POC UA: NEGATIVE
BLOOD, POC UA: NEGATIVE
BUN SERPL-MCNC: 11 MG/DL (ref 7–22)
CALCIUM SERPL-MCNC: 9.5 MG/DL (ref 8–10.3)
CHLORIDE SERPL-SCNC: 105 MMOL/L (ref 98–108)
CLARITY, POC UA: CLEAR
COLOR, POC UA: YELLOW
CREAT SERPL-MCNC: 0.9 MG/DL (ref 0.6–1.2)
GLUCOSE SERPL-MCNC: 107 MG/DL (ref 73–118)
GLUCOSE, POC UA: NEGATIVE
KETONES, POC UA: NEGATIVE
LEUKOCYTE EST, POC UA: NEGATIVE
NITRITE, POC UA: NEGATIVE
PH UR STRIP: 7 [PH]
POC ALT (SGPT): 90 U/L (ref 10–47)
POC AST (SGOT): 42 U/L (ref 11–38)
POC TCO2: 30 MMOL/L (ref 18–33)
POTASSIUM BLD-SCNC: 3.6 MMOL/L (ref 3.6–5.1)
PROTEIN, POC UA: NEGATIVE
PROTEIN, POC: 6.6 G/DL (ref 6.4–8.1)
SODIUM BLD-SCNC: 147 MMOL/L (ref 128–145)
SPECIFIC GRAVITY, POC UA: 1.02
UROBILINOGEN, POC UA: 0.2 E.U./DL

## 2021-12-24 PROCEDURE — 99284 PR EMERGENCY DEPT VISIT,LEVEL IV: ICD-10-PCS | Mod: ,,, | Performed by: EMERGENCY MEDICINE

## 2021-12-24 PROCEDURE — 99283 EMERGENCY DEPT VISIT LOW MDM: CPT | Mod: 25

## 2021-12-24 PROCEDURE — 36000 PLACE NEEDLE IN VEIN: CPT

## 2021-12-24 PROCEDURE — 25000003 PHARM REV CODE 250: Mod: ER | Performed by: INTERNAL MEDICINE

## 2021-12-24 PROCEDURE — 85025 COMPLETE CBC W/AUTO DIFF WBC: CPT | Mod: ER

## 2021-12-24 PROCEDURE — 99285 EMERGENCY DEPT VISIT HI MDM: CPT | Mod: 25,27,ER

## 2021-12-24 PROCEDURE — 80053 COMPREHEN METABOLIC PANEL: CPT | Mod: ER

## 2021-12-24 PROCEDURE — 81003 URINALYSIS AUTO W/O SCOPE: CPT | Mod: ER

## 2021-12-24 PROCEDURE — 99284 EMERGENCY DEPT VISIT MOD MDM: CPT | Mod: ,,, | Performed by: EMERGENCY MEDICINE

## 2021-12-24 PROCEDURE — 25500020 PHARM REV CODE 255: Mod: ER | Performed by: INTERNAL MEDICINE

## 2021-12-24 RX ORDER — MAG HYDROX/ALUMINUM HYD/SIMETH 200-200-20
30 SUSPENSION, ORAL (FINAL DOSE FORM) ORAL
Status: COMPLETED | OUTPATIENT
Start: 2021-12-24 | End: 2021-12-24

## 2021-12-24 RX ADMIN — IOHEXOL 100 ML: 350 INJECTION, SOLUTION INTRAVENOUS at 06:12

## 2021-12-24 RX ADMIN — MAGNESIUM HYDROXIDE/ALUMINUM HYDROXICE/SIMETHICONE 30 ML: 120; 1200; 1200 SUSPENSION ORAL at 05:12

## 2021-12-24 NOTE — ED TRIAGE NOTES
"Pt presents to the ED c/o diarrhea. Pt was admitted 1 week ago and discharged Sunday for an SBO. Discharged on regular diet and follow up with General Surgery outpatient. Pt went to [facility] this AM for diarrhea and increased abdominal distension. [facility] wanted to send pt to  ED, pt refused and came here. Pt reports diarrhea since 1900 yesterday. Pt states he's been eating a "regular diet": pizza and grilled cheese last night. Denies n/v at this time.  "

## 2021-12-24 NOTE — LETTER
Patient: Rafa Crowley  YOB: 1970  Date: 12/24/2021 Time: 6:29 AM  Location: [facility]    Leaving the Hospital Against Medical Advice    Chart #:[id number]    This will certify that I, the undersigned,    ______________________________________________________________________    A patient in the above named medical center, having requested discharge and removal from the medical center against the advice of my attending physician(s), hereby release [facility], its physicians, officers and employees, severally and individually, from any and all liability of any nature whatsoever for any injury or harm or complication of any kind that may result directly or indirectly, by reason of my terminating my stay as a patient at [facility] and my departure from [facility], and hereby waive any and all rights of action I may now have or later acquire as a result of my voluntary departure from [facility] and the termination of my stay as a patient therein.    This release is made with the full knowledge of the danger that may result from the action which I am taking.      Date:_______________________                         ___________________________                                                                                    Patient/Legal Representative    Witness:        ____________________________                          ___________________________  Nurse                                                                        Physician

## 2021-12-24 NOTE — ED PROVIDER NOTES
Encounter Date: 12/24/2021       History     Chief Complaint   Patient presents with    Diarrhea     Stated last night. Pt reports taking Immodium at home without relief. Pt was dc'ed from hospital Sunday (SBO)     51-year-old male presents to the emergency department complaining of intermittent diarrhea since discharge from [facility] for small-bowel obstruction 5 days ago.  He states he has had intermittent abdominal cramping and bloating as well as watery stools.  He denies fever/chills/nausea/vomiting/chest pain/shortness of breath.    The history is provided by the patient. No  was used.     Review of patient's allergies indicates:   Allergen Reactions    Penicillins      Past Medical History:   Diagnosis Date    Bowel obstruction     Partial small bowel obstruction      Past Surgical History:   Procedure Laterality Date    ABDOMINAL SURGERY      knee scope      KNEE SURGERY       Family History   Problem Relation Age of Onset    Cancer Mother         kidney    Cancer Father         bladder    Cancer Maternal Aunt         kidney     Social History     Tobacco Use    Smoking status: Never Smoker    Smokeless tobacco: Former User   Substance Use Topics    Alcohol use: Yes     Comment: socially    Drug use: No     Review of Systems   Constitutional: Negative for chills and fever.   Respiratory: Negative for shortness of breath.    Cardiovascular: Negative for chest pain.   Gastrointestinal: Positive for abdominal pain and diarrhea. Negative for anal bleeding, blood in stool, constipation, nausea and vomiting.   All other systems reviewed and are negative.      Physical Exam     Initial Vitals [12/24/21 0431]   BP Pulse Resp Temp SpO2   136/78 79 20 98 °F (36.7 °C) 100 %      MAP       --         Physical Exam    Nursing note and vitals reviewed.  Constitutional: He is not diaphoretic. No distress.   HENT:   Head: Normocephalic.   Mouth/Throat: Oropharynx is clear and  moist.   Eyes: Conjunctivae are normal.   Neck:   Normal range of motion.  Cardiovascular: Normal rate, regular rhythm and normal heart sounds.   Pulmonary/Chest: No respiratory distress.   Abdominal: Abdomen is soft. Bowel sounds are normal. There is no abdominal tenderness.   Musculoskeletal:         General: Normal range of motion.      Cervical back: Normal range of motion.      Comments: Ambulates without difficulty     Neurological: He is alert. He has normal strength.   Skin: Skin is warm and dry. Capillary refill takes less than 2 seconds.   Psychiatric: Thought content normal.         ED Course   Procedures  Labs Reviewed   POCT CMP - Abnormal; Notable for the following components:       Result Value    ALT (SGPT), POC 90 (*)     AST (SGOT), POC 42 (*)     POC Sodium 147 (*)     All other components within normal limits   POCT URINALYSIS W/O SCOPE   POCT CBC   POCT URINALYSIS W/O SCOPE   POCT CMP          Imaging Results          CT Abdomen Pelvis With Contrast (Final result)  Result time 12/24/21 06:21:32    Final result by Holden Gilmore MD (12/24/21 06:21:32)                 Impression:      1. Dilated loops of small bowel measuring up to 4.2 cm with suggested transition point in the right lower quadrant, overall concerning for small bowel obstruction.  No definite evidence of free intraperitoneal air or pneumatosis.  2. Mild increased attenuation is suggested dependently within the gallbladder, which could relate to sludge and/or small stones.  3. Details of additional stable incidental and chronic findings, as described in body of report.      Electronically signed by: Holden Gilmore  Date:    12/24/2021  Time:    06:21             Narrative:    EXAMINATION:  CT ABDOMEN PELVIS WITH CONTRAST    CLINICAL HISTORY:  Abdominal pain, acute, nonlocalized;    TECHNIQUE:  Low dose axial images, sagittal and coronal reformations were obtained from the lung bases to the pubic symphysis following the IV  administration of 100 mL of Omnipaque 350    COMPARISON:  Abdominal radiograph performed 12/24/2021.  CT of the abdomen pelvis performed 12/17/2021.    FINDINGS:  Lower chest: Unremarkable.    Liver: Unremarkable.    Gallbladder and bile ducts: Mild a degree of increased attenuation dependent within the gallbladder may relate to sludge versus small stones.  No pericholecystic inflammatory change.  No biliary duct dilatation is suggested.    Pancreas: Unremarkable.    Spleen: Splenomegaly.    Adrenals: Unremarkable.    Kidneys: Unremarkable.    Lymph nodes: No abdominal or pelvic lymphadenopathy.    Bowel and mesentery: Small hiatal hernia.Dilated loops of fluid filled small bowel measure up to 4.2 cm in diameter.  Suggested transition point is noted in the right lower quadrant (series 2, image 64).  No definite surrounding inflammatory change.  No pneumatosis or portal venous gas is seen.  Small amount of reactive free fluid is suggested dependently within the pelvis.  No definite free air seen.  There is normal appearance of the appendix.    Abdominal aorta: Normal caliber.  Atherosclerotic calcification.    Inferior vena cava: Unremarkable.    Free fluid or free air: As above.    Pelvis: Unremarkable.    Body wall: Small fat containing bilateral inguinal hernias.    Bones: No acute findings.                               X-Ray Abdomen Flat And Erect (Final result)  Result time 12/24/21 05:29:36    Final result by Josh Virk MD (12/24/21 05:29:36)                 Impression:      Multiple dilated bowel loops of the abdomen and pelvis noted, as discussed above.  Clinical and historical correlation is needed if indicated CT examination may be helpful for further evaluation.      Electronically signed by: Josh Virk  Date:    12/24/2021  Time:    05:29             Narrative:    EXAMINATION:  XR ABDOMEN FLAT AND ERECT    CLINICAL HISTORY:  Unspecified abdominal pain    TECHNIQUE:  Flat and erect AP views of  the abdomen were performed.    COMPARISON:  December 17, 2021    FINDINGS:  Abdominal radiographic examination is submitted, standing and supine views.  There is no evidence for free intraperitoneal air.  There are multiple dilated bowel loops throughout the abdomen and pelvis, multiple moderate to prominently dilated air-filled small bowel loops are noted.  There does appear to be air along the colon as well.  Clinical and historical correlation is needed, if clinically warranted CT examination may be helpful for further evaluation.    The osseous structures demonstrate chronic change, calcification of the pelvis may relate to phleboliths.                                 Medications   aluminum-magnesium hydroxide-simethicone 200-200-20 mg/5 mL suspension 30 mL (30 mLs Oral Given 12/24/21 0538)   iohexoL (OMNIPAQUE 350) injection 100 mL (100 mLs Intravenous Given 12/24/21 0602)     Medical Decision Making:   Initial Assessment:   51-year-old male presents to the emergency department complaining of intermittent diarrhea since discharge from [facility] for small-bowel obstruction 5 days ago.  He states he has had intermittent abdominal cramping and bloating as well as watery stools.  He denies fever/chills/nausea/vomiting/chest pain/shortness of breath.  ED Management:  CBC and CMP were grossly normal.  X-ray of abdomen revealed dilated loops of bowel and CT of the abdomen was suggested.  CT of the abdomen was performed using contrast and was concerning for small-bowel obstruction.  Results were reviewed with the patient as well as recommendation for transfer to her level of care for surgical evaluation and treatment of possible small-bowel obstruction.  Patient states he will forego transfer and go to higher level of care via private vehicle.                      Clinical Impression:   Final diagnoses:  [R19.7] Diarrhea, unspecified type (Primary)  [R19.7] Diarrhea  [R10.9] Abdominal pain  [K56.609] Small  bowel obstruction          ED Disposition Condition    AMA               Kamron Sandy MD  12/24/21 0670

## 2021-12-24 NOTE — ED PROVIDER NOTES
Encounter Date: 12/24/2021       History     Chief Complaint   Patient presents with    Diarrhea     Dx with SBO. Refused admission to [facility].      51-year-old male with past medical history of small-bowel obstruction requiring ex lap and lysis of adhesions in 2017 presents the ED complaining of diarrhea that onset at 7:00 p.m. yesterday.  Patient states he was discharged from [facility] this past Sunday following conservative management of SBO.  He presented to [facility] ER where a CT abdomen pelvis was performed that revealed SBO and he denied transfer to [facility] [place] and instead presented to [facility] via private vehicle.  Patient also reports of increased flatus which is intermittently causing him sharp pain, however, once he goes to the bathroom and defecates the pain is relieved.  Patient denies chest pain, shortness of breath, weakness, dysuria, urinary frequency, fatigue, nausea, vomiting, constipation, fevers and chills.  No other complaints this time.          Review of patient's allergies indicates:   Allergen Reactions    Penicillins      Past Medical History:   Diagnosis Date    Bowel obstruction     Partial small bowel obstruction      Past Surgical History:   Procedure Laterality Date    ABDOMINAL SURGERY      knee scope      KNEE SURGERY       Family History   Problem Relation Age of Onset    Cancer Mother         kidney    Cancer Father         bladder    Cancer Maternal Aunt         kidney     Social History     Tobacco Use    Smoking status: Never Smoker    Smokeless tobacco: Former User   Substance Use Topics    Alcohol use: Yes     Comment: socially    Drug use: No     Review of Systems   Constitutional: Negative for activity change, chills and fever.   HENT: Negative for congestion, ear pain and sore throat.    Respiratory: Negative for shortness of breath and stridor.    Cardiovascular: Negative for chest pain and palpitations.   Gastrointestinal:  "Positive for diarrhea. Negative for abdominal pain, nausea and vomiting.        "Gas abdominal pain"   Genitourinary: Negative for dysuria.   Musculoskeletal: Negative for back pain.   Skin: Negative for rash.   Neurological: Negative for dizziness, syncope, weakness and headaches.   Hematological: Does not bruise/bleed easily.       Physical Exam     Initial Vitals [12/24/21 0734]   BP Pulse Resp Temp SpO2   (!) 154/84 66 16 98.3 °F (36.8 °C) 99 %      MAP       --         Physical Exam    Nursing note and vitals reviewed.  Constitutional: Vital signs are normal. He appears well-developed and well-nourished. He is not diaphoretic. No distress.   HENT:   Head: Normocephalic and atraumatic.   Right Ear: External ear normal.   Left Ear: External ear normal.   Neck: Trachea normal. Neck supple. No thyroid mass present.   Cardiovascular: Normal rate, regular rhythm, normal heart sounds and intact distal pulses. Exam reveals no gallop and no friction rub.    No murmur heard.  Pulmonary/Chest: Breath sounds normal. No respiratory distress. He has no wheezes. He has no rhonchi. He has no rales.   Abdominal: Abdomen is soft. Normal appearance and bowel sounds are normal. He exhibits distension. There is no abdominal tenderness.   Tenderness to palpation in the right upper and left upper quadrants.  Abdomen is distended.  Auscultated hypoactive bowel sounds. There is no rebound and no guarding.   Musculoskeletal:      Cervical back: Neck supple.     Neurological: He is alert and oriented to person, place, and time. He has normal strength. No cranial nerve deficit or sensory deficit. GCS score is 15. GCS eye subscore is 4. GCS verbal subscore is 5. GCS motor subscore is 6.   Skin: Skin is warm and dry. Capillary refill takes less than 2 seconds. No rash noted.   Psychiatric: He has a normal mood and affect.         ED Course   Procedures  Labs Reviewed - No data to display       Imaging Results    None          Medications - " No data to display  Medical Decision Making:   Initial Assessment:   51-year-old male in no acute distress presents to the ED complaining of diarrhea.  Patient is able to converse normally, breath sounds are equal bilaterally and distal pulses are present.  Differential Diagnosis:   Small-bowel obstruction  Ascites  Cholecystitis  Gastroenteritis  ED Management:  0912:  General surgeon called back and they will come down to see patient.  Cholecystitis is less likely as CT from previous ED did not reveal so.  No history of liver disease so ascites is less likely.    0938: General surgery deemed the small-bowel obstruction present in the patient did not warrant surgical intervention. General surgery offered overnight observation vs. discharge home from the ED with return precautions.  Patient states he has comfortable going home and returning if need be.     0959:  General surgery recommended no further action for patient's symptomatic diarrhea. Will discharge patient.            Attending Attestation:   Physician Attestation Statement for Resident:  As the supervising MD   Physician Attestation Statement: I have personally seen and examined this patient.   I agree with the above history. -: 51-year-old male with history of SBO with lysis of adhesions (2017), recent hospitalization for SBO presents with chief complaint of diarrhea.  Patient went to outside ED at which time CT revealed a small bowel obstruction.  Patient was planned to be transferred but left and drove himself to [facility].  He denies any abdominal pain or nausea.  He just reports diarrhea since last night.   As the supervising MD I agree with the above PE.   -: Abdomen is distended and firm.  There is some mild tenderness to deep palpation in the upper quadrants.  Hypoactive bowel sounds.   As the supervising MD I agree with the above treatment, course, plan, and disposition.   -: Presentation is somewhat odd given lack of abdominal pain or nausea despite  CT.  General surgery was consulted for management recommendations.                         Clinical Impression:   Final diagnoses:  [R19.7] Diarrhea, unspecified type (Primary)          ED Disposition Condition    Discharge Stable        ED Prescriptions     None        Follow-up Information     Follow up With Specialties Details Why Contact Info    Katheryn Espinosa MD Internal Medicine, Wound Care Schedule an appointment as soon as possible for a visit in 1 week  [address] [place] [address]  [address]  [place] [address]  [phone]      [facility] - Emergency Dept Emergency Medicine Go to  If symptoms worsen [address] [facility] [address] [phone]           Nena Andujar MD  Resident  12/24/21 1002       Nena Andujar MD  Resident  12/24/21 1003       Nena Andujar MD  Resident  12/24/21 1248

## 2021-12-24 NOTE — CONSULTS
[facility] - Emergency Dept  General Surgery  Consult Note    Inpatient consult to General Surgery  Consult performed by: Richard Crabtree MD  Consult ordered by: Nena Andujar MD        Subjective:     Chief Complaint/Reason for Consult: diarrhea, pSBO    History of Present Illness: Rafa Crowley is a 51 y.o. male with h/o SBO requiring ex-lap, AC in 2017 presents to the ED with complaint of watery diarrhea. He was recently admitted at [facility] from 12/17-19 for pSBO. SBFT was done, he had return of bowel function, and discharged on 12/19. The patient reports he has been doing well since discharge besides having multiple liquids stools per day. He has been tolerating a regular diet. Denies any abdominal pain, nausea, or vomiting. He is passing flatus. Feels hungry.   In ED, vitals are wnl. Labs unremarkable. CT scan findings are similar to imaging obtained on 12/17. Dilated small bowel loops with questionable transition in RLQ. Stomach not distended.     Current Facility-Administered Medications on File Prior to Encounter   Medication    [COMPLETED] aluminum-magnesium hydroxide-simethicone 200-200-20 mg/5 mL suspension 30 mL    [COMPLETED] iohexoL (OMNIPAQUE 350) injection 100 mL     No current outpatient medications on file prior to encounter.       Review of patient's allergies indicates:   Allergen Reactions    Penicillins        Past Medical History:   Diagnosis Date    Bowel obstruction     Partial small bowel obstruction      Past Surgical History:   Procedure Laterality Date    ABDOMINAL SURGERY      knee scope      KNEE SURGERY       Family History     Problem Relation (Age of Onset)    Cancer Mother, Father, Maternal Aunt        Tobacco Use    Smoking status: Never Smoker    Smokeless tobacco: Former User   Substance and Sexual Activity    Alcohol use: Yes     Comment: socially    Drug use: No    Sexual activity: Yes     Partners: Female     Review of Systems   Constitutional: Negative.   Negative for chills, fatigue and fever.   HENT: Negative.    Eyes: Negative.    Respiratory: Negative.    Cardiovascular: Negative.    Gastrointestinal: Positive for diarrhea. Negative for abdominal pain, nausea and vomiting.   Endocrine: Negative.    Genitourinary: Negative.    Musculoskeletal: Negative.    Skin: Negative.    Allergic/Immunologic: Negative.    Neurological: Negative.    Hematological: Negative.    Psychiatric/Behavioral: Negative.      Objective:     Vital Signs (Most Recent):  Temp: 98.3 °F (36.8 °C) (12/24/21 0734)  Pulse: 62 (12/24/21 0807)  Resp: 16 (12/24/21 0734)  BP: 130/71 (12/24/21 0807)  SpO2: 100 % (12/24/21 0807) Vital Signs (24h Range):  Temp:  [98 °F (36.7 °C)-98.3 °F (36.8 °C)] 98.3 °F (36.8 °C)  Pulse:  [62-79] 62  Resp:  [14-20] 16  SpO2:  [99 %-100 %] 100 %  BP: (122-154)/(71-84) 130/71     Weight: 97.5 kg (215 lb)  Body mass index is 33.67 kg/m².    No intake or output data in the 24 hours ending 12/24/21 0949    Physical Exam  Vitals and nursing note reviewed.   Constitutional:       Appearance: Normal appearance.   HENT:      Head: Normocephalic.      Mouth/Throat:      Mouth: Mucous membranes are moist.   Eyes:      Extraocular Movements: Extraocular movements intact.      Pupils: Pupils are equal, round, and reactive to light.   Cardiovascular:      Rate and Rhythm: Normal rate and regular rhythm.      Pulses: Normal pulses.   Abdominal:      General: Abdomen is flat. There is no distension.      Palpations: Abdomen is soft. There is no mass.      Tenderness: There is no abdominal tenderness.      Hernia: No hernia is present.      Comments: Midline incision well healed. No hernia.    Skin:     General: Skin is warm.   Neurological:      General: No focal deficit present.      Mental Status: He is oriented to person, place, and time.   Psychiatric:         Mood and Affect: Mood normal.         Behavior: Behavior normal.         Significant Labs:  All pertinent labs from the  last 24 hours have been reviewed.    Significant Diagnostics:  I have reviewed all pertinent imaging results/findings within the past 24 hours.    Assessment/Plan:    51 y.o. male with h/o SBO requiring ex-lap, AC in 2017 and recent SBO managed conservatively presents with diarrhea    No abdominal pain, nausea or vomiting. He is having BMs and flatus.   Suspect his diarrhea is secondary to recent oral contrast load from SBFT obtained at [facility]. He is not clinically obstructed.   I offered overnight observation vs d/c home from the ED. The patient lives close by and is comfortable with going home and returning to the ED if his symptoms don't improve or worsens.     Richard Crabtree MD  General Surgery PGYV

## 2021-12-27 ENCOUNTER — PATIENT MESSAGE (OUTPATIENT)
Dept: GASTROENTEROLOGY | Facility: CLINIC | Age: 51
End: 2021-12-27
Payer: COMMERCIAL

## 2022-01-11 ENCOUNTER — TELEPHONE (OUTPATIENT)
Dept: ENDOSCOPY | Facility: HOSPITAL | Age: 52
End: 2022-01-11
Payer: COMMERCIAL

## 2022-01-20 ENCOUNTER — TELEPHONE (OUTPATIENT)
Dept: SURGERY | Facility: CLINIC | Age: 52
End: 2022-01-20
Payer: COMMERCIAL

## 2022-01-21 ENCOUNTER — OFFICE VISIT (OUTPATIENT)
Dept: SURGERY | Facility: CLINIC | Age: 52
End: 2022-01-21
Payer: COMMERCIAL

## 2022-01-21 VITALS
SYSTOLIC BLOOD PRESSURE: 145 MMHG | DIASTOLIC BLOOD PRESSURE: 76 MMHG | HEART RATE: 75 BPM | BODY MASS INDEX: 35.29 KG/M2 | HEIGHT: 67 IN | WEIGHT: 224.88 LBS

## 2022-01-21 DIAGNOSIS — K56.609 SMALL BOWEL OBSTRUCTION: Primary | ICD-10-CM

## 2022-01-21 PROCEDURE — 1159F PR MEDICATION LIST DOCUMENTED IN MEDICAL RECORD: ICD-10-PCS | Mod: CPTII,S$GLB,, | Performed by: STUDENT IN AN ORGANIZED HEALTH CARE EDUCATION/TRAINING PROGRAM

## 2022-01-21 PROCEDURE — 3077F PR MOST RECENT SYSTOLIC BLOOD PRESSURE >= 140 MM HG: ICD-10-PCS | Mod: CPTII,S$GLB,, | Performed by: STUDENT IN AN ORGANIZED HEALTH CARE EDUCATION/TRAINING PROGRAM

## 2022-01-21 PROCEDURE — 99204 PR OFFICE/OUTPT VISIT, NEW, LEVL IV, 45-59 MIN: ICD-10-PCS | Mod: S$GLB,,, | Performed by: STUDENT IN AN ORGANIZED HEALTH CARE EDUCATION/TRAINING PROGRAM

## 2022-01-21 PROCEDURE — 3077F SYST BP >= 140 MM HG: CPT | Mod: CPTII,S$GLB,, | Performed by: STUDENT IN AN ORGANIZED HEALTH CARE EDUCATION/TRAINING PROGRAM

## 2022-01-21 PROCEDURE — 3078F DIAST BP <80 MM HG: CPT | Mod: CPTII,S$GLB,, | Performed by: STUDENT IN AN ORGANIZED HEALTH CARE EDUCATION/TRAINING PROGRAM

## 2022-01-21 PROCEDURE — 99999 PR PBB SHADOW E&M-EST. PATIENT-LVL III: ICD-10-PCS | Mod: PBBFAC,,, | Performed by: STUDENT IN AN ORGANIZED HEALTH CARE EDUCATION/TRAINING PROGRAM

## 2022-01-21 PROCEDURE — 1159F MED LIST DOCD IN RCRD: CPT | Mod: CPTII,S$GLB,, | Performed by: STUDENT IN AN ORGANIZED HEALTH CARE EDUCATION/TRAINING PROGRAM

## 2022-01-21 PROCEDURE — 3008F PR BODY MASS INDEX (BMI) DOCUMENTED: ICD-10-PCS | Mod: CPTII,S$GLB,, | Performed by: STUDENT IN AN ORGANIZED HEALTH CARE EDUCATION/TRAINING PROGRAM

## 2022-01-21 PROCEDURE — 3008F BODY MASS INDEX DOCD: CPT | Mod: CPTII,S$GLB,, | Performed by: STUDENT IN AN ORGANIZED HEALTH CARE EDUCATION/TRAINING PROGRAM

## 2022-01-21 PROCEDURE — 99204 OFFICE O/P NEW MOD 45 MIN: CPT | Mod: S$GLB,,, | Performed by: STUDENT IN AN ORGANIZED HEALTH CARE EDUCATION/TRAINING PROGRAM

## 2022-01-21 PROCEDURE — 99999 PR PBB SHADOW E&M-EST. PATIENT-LVL III: CPT | Mod: PBBFAC,,, | Performed by: STUDENT IN AN ORGANIZED HEALTH CARE EDUCATION/TRAINING PROGRAM

## 2022-01-21 PROCEDURE — 3078F PR MOST RECENT DIASTOLIC BLOOD PRESSURE < 80 MM HG: ICD-10-PCS | Mod: CPTII,S$GLB,, | Performed by: STUDENT IN AN ORGANIZED HEALTH CARE EDUCATION/TRAINING PROGRAM

## 2022-01-21 NOTE — PROGRESS NOTES
[facility]  Colon and Rectal Surgery    [address]  [place], [place]  Tel: [phone]  Fax: [phone]  [email or web address]   MD Ernie Urban MD Brian Kann, MD W. Forrest Johnston, MD Matthew Giglia, MD Jennifer Paruch, MD William Kethman, MD     Patient name: Rafa Crowley   YOB: 1970   MRN: [id number]  Date of visit: 01/21/2022    Dear Dr. Espinosa,    It was a pleasure seeing Mr. Crowley in the Colon and Rectal Surgery clinic here at [facility].     As you know, Mr. Crowley is a 51 year old man with no significant medical history who presents for evaluation of recurrent SBO. He was seen by Dr. Baltazar recently for recurrent SBO - she ordered a colonoscopy and MRE for evaluation. He underwent a laparoscopic, converted to open, lysis of adhesions in 2017 by Dr. Arreguin and was recently admitted for non-operative management of a pSBO. He is doing well - prior to his surgery in 2017 he had never had a bowel obstruction. He denies any pain, nausea or vomiting currently.    2017 - Exploratory laparotomy, lysis of adhesions - laparoscopic, converted to open, minimal adhesions described but they did lyse the band, he did not have any other evidence of pathology (operative note reviewed personally and with the patient)    12/17/2021 - CT AP - Images reviewed personally and with the patient, agree with location of transition  1. Mildly distended fluid-filled loops of proximal and mid small bowel suspicious for least partial small bowel obstruction with possible transition point in the right lower quadrant. No evidence of free intraperitoneal air or pneumatosis.    12/18/2021 - Small bowel follow through  No evidence of complete obstruction - antegrade contrast appreciated    No history of colonoscopy    The patient was informed of the availability of a certified  without charge. A certified  was not necessary  "for this visit.    Review of Systems  See pertinent review of systems above    Past Medical History:   Diagnosis Date    Bowel obstruction     Partial small bowel obstruction      Past Surgical History:   Procedure Laterality Date    ABDOMINAL SURGERY      knee scope      KNEE SURGERY       Family History   Problem Relation Age of Onset    Cancer Mother         kidney    Cancer Father         bladder    Cancer Maternal Aunt         kidney    Inflammatory bowel disease Neg Hx     Colon cancer Neg Hx      Social History     Tobacco Use    Smoking status: Never Smoker    Smokeless tobacco: Former User   Substance Use Topics    Alcohol use: Yes     Comment: socially    Drug use: No     Review of patient's allergies indicates:   Allergen Reactions    Penicillins        No current outpatient medications on file prior to visit.     No current facility-administered medications on file prior to visit.     Physical Examination  BP (!) 145/76 (BP Location: Left arm, Patient Position: Sitting, BP Method: Large (Automatic))   Pulse 75   Ht 5' 7" (1.702 m)   Wt 102 kg (224 lb 13.9 oz)   BMI 35.22 kg/m²      A chaperone was present for the physical examination.    Constitutional: well developed, no cough, no dyspnea, alert, and no acute distress    Head: Normocephalic, no lesions, without obvious abnormality  Eye: Normal external eye, conjunctiva, and lids  Cardiovascular: regular rate and regular rhythm  Respiratory: normal air entry  Gastrointestinal: soft, non-tender, no midline hernia, scar well-healed  Musculoskeletal: full range of motion without pain  Neurologic: alert, oriented, normal speech, no focal findings or movement disorder noted  Psychiatric: appropriate, normal mood    Assessment and Plan of Care    Thank you again for referring Mr. Crowley to my care. In summary, Mr. Crowley is a 51 year old man presenting with a recurrent pSBO. We discussed treatment options and have provided the following " recommendations:    1. Agree with need for colonoscopy for mostly screening purposes but there will be utility in TI evaluation  2. MRE will be useful, however, suspect that obstruction is more related to recurrent adhesive disease  3. Discussed benefits and risks of pursuing repeat surgical intervention and likelihood for recurrent obstructive symptoms and development of scar tissue  4. Follow-up after completion of MRE     Please do not hesitate to contact me if you have any questions.      Pietro Madsen MD - Staff Surgeon  Department of Colon & Rectal Surgery  [facility]

## 2022-02-21 ENCOUNTER — HOSPITAL ENCOUNTER (OUTPATIENT)
Dept: RADIOLOGY | Facility: HOSPITAL | Age: 52
Discharge: HOME | End: 2022-02-21
Attending: INTERNAL MEDICINE
Payer: COMMERCIAL

## 2022-02-21 ENCOUNTER — TELEPHONE (OUTPATIENT)
Dept: SURGERY | Facility: CLINIC | Age: 52
End: 2022-02-21
Payer: COMMERCIAL

## 2022-02-21 DIAGNOSIS — K56.609 SMALL BOWEL OBSTRUCTION: ICD-10-CM

## 2022-02-21 PROCEDURE — A9585 GADOBUTROL INJECTION: HCPCS | Performed by: INTERNAL MEDICINE

## 2022-02-21 PROCEDURE — 72197 MRI PELVIS W/O & W/DYE: CPT | Mod: TC

## 2022-02-21 PROCEDURE — 74183 MRI ENTEROGRAPHY: ICD-10-PCS | Mod: 26,,, | Performed by: RADIOLOGY

## 2022-02-21 PROCEDURE — 72197 MRI PELVIS W/O & W/DYE: CPT | Mod: 26,,, | Performed by: RADIOLOGY

## 2022-02-21 PROCEDURE — 74183 MRI ABD W/O CNTR FLWD CNTR: CPT | Mod: 26,,, | Performed by: RADIOLOGY

## 2022-02-21 PROCEDURE — 72197 MRI ENTEROGRAPHY: ICD-10-PCS | Mod: 26,,, | Performed by: RADIOLOGY

## 2022-02-21 PROCEDURE — 25500020 PHARM REV CODE 255: Performed by: INTERNAL MEDICINE

## 2022-02-21 RX ORDER — GADOBUTROL 604.72 MG/ML
10 INJECTION INTRAVENOUS
Status: COMPLETED | OUTPATIENT
Start: 2022-02-21 | End: 2022-02-21

## 2022-02-21 RX ADMIN — GADOBUTROL 10 ML: 604.72 INJECTION INTRAVENOUS at 11:02

## 2022-02-21 NOTE — PROGRESS NOTES
[facility]  Colon and Rectal Surgery    [address]  [place], [place]  Tel: [phone]  Fax: [phone]  [email or web address]   MD Ernie Urban MD Brian Kann, MD W. Forrest Johnston, MD Matthew Giglia, MD Jennifer Paruch, MD William Kethman, MD     Patient name: Rafa Crowley   YOB: 1970   MRN: [id number]  Date of visit: 02/22/2022    Dear Dr. Espinosa,    It was a pleasure seeing Mr. Crowley in the Colon and Rectal Surgery clinic here at [facility].     As you know, Mr. Crowley is a 51 year old man with no significant medical history who presents for evaluation of recurrent SBO. He was seen by Dr. Baltazar recently for recurrent SBO - she ordered a colonoscopy and MRE for evaluation. He underwent a laparoscopic, converted to open, lysis of adhesions in 2017 by Dr. Arreguin and was recently admitted for non-operative management of a pSBO. He is doing well - prior to his surgery in 2017 he had never had a bowel obstruction. He denies any pain, nausea or vomiting currently.    2017 - Exploratory laparotomy, lysis of adhesions - laparoscopic, converted to open, minimal adhesions described but they did lyse the band, he did not have any other evidence of pathology (operative note reviewed personally and with the patient)    12/17/2021 - CT AP - Images reviewed personally and with the patient, agree with location of transition  1. Mildly distended fluid-filled loops of proximal and mid small bowel suspicious for least partial small bowel obstruction with possible transition point in the right lower quadrant. No evidence of free intraperitoneal air or pneumatosis.    12/18/2021 - Small bowel follow through  No evidence of complete obstruction - antegrade contrast appreciated    No history of colonoscopy    2/22/2022  Since our last visit, colonoscopy was scheduled on 3/1/2022. MRE confirmed no evidence of mass or stricture. Remaining relatively  "asymptomatic.    MRE Enterography - 2/21/2022  No findings to explain small-bowel obstruction.  No evidence of stricture or mass.  No imaging findings of small bowel inflammation.    The patient was informed of the availability of a certified  without charge. A certified  was not necessary for this visit.    Review of Systems  See pertinent review of systems above    Past Medical History:   Diagnosis Date    Bowel obstruction     Partial small bowel obstruction      Past Surgical History:   Procedure Laterality Date    ABDOMINAL SURGERY      knee scope      KNEE SURGERY       Family History   Problem Relation Age of Onset    Cancer Mother         kidney    Cancer Father         bladder    Cancer Maternal Aunt         kidney    Inflammatory bowel disease Neg Hx     Colon cancer Neg Hx      Social History     Tobacco Use    Smoking status: Never Smoker    Smokeless tobacco: Former User     Quit date: 3/13/2017   Substance Use Topics    Alcohol use: Yes     Comment: socially    Drug use: No     Review of patient's allergies indicates:   Allergen Reactions    Penicillins        No current outpatient medications on file prior to visit.     Current Facility-Administered Medications on File Prior to Visit   Medication Dose Route Frequency Provider Last Rate Last Admin    [COMPLETED] gadobutroL (GADAVIST) injection 10 mL  10 mL Intravenous ONCE PRN Spencer Baltazar MD   10 mL at 02/21/22 1137     Physical Examination  /77 (BP Location: Left arm, Patient Position: Sitting, BP Method: Large (Automatic))   Pulse 64   Ht 5' 7" (1.702 m)   Wt 101 kg (222 lb 10.6 oz)   BMI 34.87 kg/m²      A chaperone was present for the physical examination.    Constitutional: well developed, no cough, no dyspnea, alert, and no acute distress    Head: Normocephalic, no lesions, without obvious abnormality  Eye: Normal external eye, conjunctiva, and lids  Cardiovascular: regular rate and " regular rhythm  Respiratory: normal air entry  Gastrointestinal: soft, non-tender, no midline hernia, scar well-healed  Musculoskeletal: full range of motion without pain  Neurologic: alert, oriented, normal speech, no focal findings or movement disorder noted  Psychiatric: appropriate, normal mood    Assessment and Plan of Care    Thank you again for referring Mr. Crowley to my care. In summary, Mr. Crowley is a 51 year old man presenting with a recurrent pSBO. We discussed treatment options and have provided the following recommendations:    1. Discussed benefits and risks of pursuing repeat surgical intervention and likelihood for recurrent obstructive symptoms and development of scar tissue - would recommend against intervention at this time, if frequency of symptoms increased then can consider (at risk of development of future adhesions)  2. Follow-up colonoscopy - looks like he is scheduled on Jaswant Gras day - he would like this rescheduled (card provided)    Please do not hesitate to contact me if you have any questions.      Pietro Madsen MD - Staff Surgeon  Department of Colon & Rectal Surgery  [facility]

## 2022-02-22 ENCOUNTER — OFFICE VISIT (OUTPATIENT)
Dept: SURGERY | Facility: CLINIC | Age: 52
End: 2022-02-22
Payer: COMMERCIAL

## 2022-02-22 VITALS
BODY MASS INDEX: 34.95 KG/M2 | HEART RATE: 64 BPM | DIASTOLIC BLOOD PRESSURE: 77 MMHG | SYSTOLIC BLOOD PRESSURE: 133 MMHG | WEIGHT: 222.69 LBS | HEIGHT: 67 IN

## 2022-02-22 DIAGNOSIS — Z12.11 SPECIAL SCREENING FOR MALIGNANT NEOPLASMS, COLON: Primary | ICD-10-CM

## 2022-02-22 DIAGNOSIS — K56.609 SMALL BOWEL OBSTRUCTION: Primary | ICD-10-CM

## 2022-02-22 PROCEDURE — 99999 PR PBB SHADOW E&M-EST. PATIENT-LVL III: CPT | Mod: PBBFAC,,, | Performed by: STUDENT IN AN ORGANIZED HEALTH CARE EDUCATION/TRAINING PROGRAM

## 2022-02-22 PROCEDURE — 3078F DIAST BP <80 MM HG: CPT | Mod: CPTII,S$GLB,, | Performed by: STUDENT IN AN ORGANIZED HEALTH CARE EDUCATION/TRAINING PROGRAM

## 2022-02-22 PROCEDURE — 3008F BODY MASS INDEX DOCD: CPT | Mod: CPTII,S$GLB,, | Performed by: STUDENT IN AN ORGANIZED HEALTH CARE EDUCATION/TRAINING PROGRAM

## 2022-02-22 PROCEDURE — 99999 PR PBB SHADOW E&M-EST. PATIENT-LVL III: ICD-10-PCS | Mod: PBBFAC,,, | Performed by: STUDENT IN AN ORGANIZED HEALTH CARE EDUCATION/TRAINING PROGRAM

## 2022-02-22 PROCEDURE — 1159F MED LIST DOCD IN RCRD: CPT | Mod: CPTII,S$GLB,, | Performed by: STUDENT IN AN ORGANIZED HEALTH CARE EDUCATION/TRAINING PROGRAM

## 2022-02-22 PROCEDURE — 3008F PR BODY MASS INDEX (BMI) DOCUMENTED: ICD-10-PCS | Mod: CPTII,S$GLB,, | Performed by: STUDENT IN AN ORGANIZED HEALTH CARE EDUCATION/TRAINING PROGRAM

## 2022-02-22 PROCEDURE — 99212 PR OFFICE/OUTPT VISIT, EST, LEVL II, 10-19 MIN: ICD-10-PCS | Mod: S$GLB,,, | Performed by: STUDENT IN AN ORGANIZED HEALTH CARE EDUCATION/TRAINING PROGRAM

## 2022-02-22 PROCEDURE — 99212 OFFICE O/P EST SF 10 MIN: CPT | Mod: S$GLB,,, | Performed by: STUDENT IN AN ORGANIZED HEALTH CARE EDUCATION/TRAINING PROGRAM

## 2022-02-22 PROCEDURE — 3075F SYST BP GE 130 - 139MM HG: CPT | Mod: CPTII,S$GLB,, | Performed by: STUDENT IN AN ORGANIZED HEALTH CARE EDUCATION/TRAINING PROGRAM

## 2022-02-22 PROCEDURE — 3075F PR MOST RECENT SYSTOLIC BLOOD PRESS GE 130-139MM HG: ICD-10-PCS | Mod: CPTII,S$GLB,, | Performed by: STUDENT IN AN ORGANIZED HEALTH CARE EDUCATION/TRAINING PROGRAM

## 2022-02-22 PROCEDURE — 3078F PR MOST RECENT DIASTOLIC BLOOD PRESSURE < 80 MM HG: ICD-10-PCS | Mod: CPTII,S$GLB,, | Performed by: STUDENT IN AN ORGANIZED HEALTH CARE EDUCATION/TRAINING PROGRAM

## 2022-02-22 PROCEDURE — 1159F PR MEDICATION LIST DOCUMENTED IN MEDICAL RECORD: ICD-10-PCS | Mod: CPTII,S$GLB,, | Performed by: STUDENT IN AN ORGANIZED HEALTH CARE EDUCATION/TRAINING PROGRAM

## 2022-02-22 RX ORDER — POLYETHYLENE GLYCOL 3350, SODIUM SULFATE ANHYDROUS, SODIUM BICARBONATE, SODIUM CHLORIDE, POTASSIUM CHLORIDE 236; 22.74; 6.74; 5.86; 2.97 G/4L; G/4L; G/4L; G/4L; G/4L
4 POWDER, FOR SOLUTION ORAL ONCE
Qty: 4000 ML | Refills: 0 | Status: SHIPPED | OUTPATIENT
Start: 2022-02-22 | End: 2022-02-22

## 2022-03-09 ENCOUNTER — HOSPITAL ENCOUNTER (EMERGENCY)
Facility: HOSPITAL | Age: 52
Discharge: HOME | End: 2022-03-09
Attending: EMERGENCY MEDICINE
Payer: COMMERCIAL

## 2022-03-09 VITALS
SYSTOLIC BLOOD PRESSURE: 132 MMHG | TEMPERATURE: 99 F | RESPIRATION RATE: 15 BRPM | WEIGHT: 225 LBS | HEIGHT: 66 IN | DIASTOLIC BLOOD PRESSURE: 83 MMHG | BODY MASS INDEX: 36.16 KG/M2 | OXYGEN SATURATION: 98 % | HEART RATE: 86 BPM

## 2022-03-09 DIAGNOSIS — J06.9 VIRAL URI WITH COUGH: Primary | ICD-10-CM

## 2022-03-09 PROCEDURE — 99283 EMERGENCY DEPT VISIT LOW MDM: CPT | Mod: ER

## 2022-03-09 RX ORDER — BENZONATATE 200 MG/1
200 CAPSULE ORAL 3 TIMES DAILY PRN
Qty: 30 CAPSULE | Refills: 0 | Status: SHIPPED | OUTPATIENT
Start: 2022-03-09 | End: 2022-03-19

## 2022-03-09 NOTE — ED TRIAGE NOTES
52 y.o male presents to the ED with chief complaint of cough. Pt reports cough, congestion, and watery eyes x 4 days. Denies any other symptoms. Has been taking OTC medications x 2 days. AATremaine4, NAD.

## 2022-03-09 NOTE — DISCHARGE INSTRUCTIONS
Drink plenty of clear fluids. Take ibuprofen 600mg every 6 hours and tylenol 650mg every 6 hours as needed for fever, pain or headache.  Take xyzal, zyrtec, allegra, or claritin for runny nose, cough, or postnasal drip.  Take sudafed or use afrin for nasal, sinus or ear congestion.  Take mucinex DM or robitussin DM for cough.      Take tessalon perles for cough.  You may take DOXYLAMINE at bedtime for cough and to help you sleep.

## 2022-03-09 NOTE — ED PROVIDER NOTES
Encounter Date: 3/9/2022    SCRIBE #1 NOTE: I, Rashaad Oswaldo, am scribing for, and in the presence of, Laura Gomez MD.       History     Chief Complaint   Patient presents with    Cough     Pt reports cough, post nasal drip, watery eyes x4 days     52 year old male who presents to the ED with complaints of a cough, congestion, post nasal drip, and watery eyes for four days. Symptoms have been constant and moderate since onset.  Denies any associated fever. Denies any known sick contact. Has attempted treatment with Guaifenesin, Mucinex, and Xyzal since last night and Afrin for two days. Denies Hx of smoking, alcohol use, or drug use. No other complaints at this time.      The history is provided by the patient. No  was used.     Review of patient's allergies indicates:   Allergen Reactions    Penicillins      Past Medical History:   Diagnosis Date    Bowel obstruction     Partial small bowel obstruction      Past Surgical History:   Procedure Laterality Date    ABDOMINAL SURGERY      knee scope      KNEE SURGERY       Family History   Problem Relation Age of Onset    Cancer Mother         kidney    Cancer Father         bladder    Cancer Maternal Aunt         kidney    Inflammatory bowel disease Neg Hx     Colon cancer Neg Hx      Social History     Tobacco Use    Smoking status: Never Smoker    Smokeless tobacco: Former User     Quit date: 3/13/2017   Substance Use Topics    Alcohol use: Yes     Comment: socially    Drug use: No     Review of Systems   Constitutional: Negative for activity change, appetite change, chills and fever.   HENT: Positive for congestion and postnasal drip. Negative for rhinorrhea, sneezing and sore throat.    Eyes:        Positive for watery eyes.    Respiratory: Positive for cough. Negative for chest tightness, shortness of breath and wheezing.    Cardiovascular: Negative for chest pain and palpitations.   Gastrointestinal: Negative for abdominal  pain, diarrhea, nausea and vomiting.   Skin: Negative for rash.   Neurological: Negative for dizziness, syncope, light-headedness and headaches.   All other systems reviewed and are negative.      Physical Exam     Initial Vitals [03/09/22 1037]   BP Pulse Resp Temp SpO2   132/83 86 15 99 °F (37.2 °C) 98 %      MAP       --         Physical Exam    Nursing note and vitals reviewed.  Constitutional: He appears well-developed and well-nourished. No distress.   HENT:   Head: Normocephalic and atraumatic.   Right Ear: Tympanic membrane normal.   Left Ear: Tympanic membrane normal.   Mouth/Throat: Uvula is midline and oropharynx is clear and moist. No oropharyngeal exudate, posterior oropharyngeal edema or posterior oropharyngeal erythema.   Eyes: Conjunctivae are normal.   Neck:   Normal range of motion.  Cardiovascular: Normal rate, regular rhythm, normal heart sounds and intact distal pulses. Exam reveals no gallop and no friction rub.    No murmur heard.  Pulmonary/Chest: Breath sounds normal. No respiratory distress. He has no wheezes. He has no rhonchi. He has no rales.   Abdominal: Bowel sounds are normal. He exhibits no distension.   Musculoskeletal:         General: Normal range of motion.      Cervical back: Normal range of motion.     Neurological: He is alert and oriented to person, place, and time.   Skin: Skin is warm and dry.   Psychiatric: He has a normal mood and affect. His behavior is normal.         ED Course   Procedures  Labs Reviewed - No data to display       Imaging Results    None          Medications - No data to display  Medical Decision Making:   History:   Old Medical Records: I decided to obtain old medical records.  ED Management:  URI symptoms - no fever, no signs of infection on exam.  Declined covid and flu testing.  Likely viral - treat symptomatically with OTC meds.  Pt requested tessalon perles.            Scribe Attestation:   Scribe #1: I performed the above scribed service and the  documentation accurately describes the services I performed. I attest to the accuracy of the note.                 I, Dr. Laura Gomez, personally performed the services described in this documentation.   All medical record entries made by the scribe were at my direction and in my presence.   I have reviewed the chart and agree that the record is accurate and complete.   Laura Gomez MD.  11:10 AM 03/09/2022     Clinical Impression:   Final diagnoses:  [J06.9] Viral URI with cough (Primary)          ED Disposition Condition    Discharge Stable        ED Prescriptions     Medication Sig Dispense Start Date End Date Auth. Provider    benzonatate (TESSALON) 200 MG capsule Take 1 capsule (200 mg total) by mouth 3 (three) times daily as needed for Cough. 30 capsule 3/9/2022 3/19/2022 Laura Gomez MD        Follow-up Information     Follow up With Specialties Details Why Contact Info    Katheryn Espinosa MD Internal Medicine, Wound Care  As needed [address] [address]  [address]  [facility] [id number] [phone]             Laura Gomez MD  03/09/22 1605

## 2022-03-09 NOTE — Clinical Note
"Rafa Martínez" Keo was seen and treated in our emergency department on 3/9/2022.  He may return to work on 03/12/2022.       If you have any questions or concerns, please don't hesitate to call.      KELBY Kumar RN RN    "

## 2022-04-12 DIAGNOSIS — Z12.11 SPECIAL SCREENING FOR MALIGNANT NEOPLASMS, COLON: Primary | ICD-10-CM

## 2022-04-12 RX ORDER — SODIUM, POTASSIUM,MAG SULFATES 17.5-3.13G
1 SOLUTION, RECONSTITUTED, ORAL ORAL DAILY
Qty: 1 KIT | Refills: 0 | Status: SHIPPED | OUTPATIENT
Start: 2022-04-12 | End: 2022-04-12

## 2022-04-13 ENCOUNTER — ANESTHESIA (OUTPATIENT)
Dept: ENDOSCOPY | Facility: HOSPITAL | Age: 52
End: 2022-04-13
Payer: COMMERCIAL

## 2022-04-13 ENCOUNTER — HOSPITAL ENCOUNTER (OUTPATIENT)
Facility: HOSPITAL | Age: 52
Discharge: HOME | End: 2022-04-13
Attending: STUDENT IN AN ORGANIZED HEALTH CARE EDUCATION/TRAINING PROGRAM | Admitting: STUDENT IN AN ORGANIZED HEALTH CARE EDUCATION/TRAINING PROGRAM
Payer: COMMERCIAL

## 2022-04-13 ENCOUNTER — ANESTHESIA EVENT (OUTPATIENT)
Dept: ENDOSCOPY | Facility: HOSPITAL | Age: 52
End: 2022-04-13
Payer: COMMERCIAL

## 2022-04-13 VITALS
BODY MASS INDEX: 35.63 KG/M2 | HEART RATE: 68 BPM | HEIGHT: 67 IN | SYSTOLIC BLOOD PRESSURE: 127 MMHG | TEMPERATURE: 98 F | RESPIRATION RATE: 16 BRPM | OXYGEN SATURATION: 100 % | DIASTOLIC BLOOD PRESSURE: 77 MMHG | WEIGHT: 227 LBS

## 2022-04-13 DIAGNOSIS — Z12.11 SCREENING FOR MALIGNANT NEOPLASM OF COLON: Primary | ICD-10-CM

## 2022-04-13 PROCEDURE — G0121 COLON CA SCRN NOT HI RSK IND: HCPCS | Performed by: STUDENT IN AN ORGANIZED HEALTH CARE EDUCATION/TRAINING PROGRAM

## 2022-04-13 PROCEDURE — G0121 COLON CA SCRN NOT HI RSK IND: ICD-10-PCS | Mod: ,,, | Performed by: STUDENT IN AN ORGANIZED HEALTH CARE EDUCATION/TRAINING PROGRAM

## 2022-04-13 PROCEDURE — 63600175 PHARM REV CODE 636 W HCPCS: Performed by: NURSE ANESTHETIST, CERTIFIED REGISTERED

## 2022-04-13 PROCEDURE — 37000009 HC ANESTHESIA EA ADD 15 MINS: Performed by: STUDENT IN AN ORGANIZED HEALTH CARE EDUCATION/TRAINING PROGRAM

## 2022-04-13 PROCEDURE — 37000008 HC ANESTHESIA 1ST 15 MINUTES: Performed by: STUDENT IN AN ORGANIZED HEALTH CARE EDUCATION/TRAINING PROGRAM

## 2022-04-13 PROCEDURE — E9220 PRA ENDO ANESTHESIA: HCPCS | Mod: ,,, | Performed by: NURSE ANESTHETIST, CERTIFIED REGISTERED

## 2022-04-13 PROCEDURE — G0121 COLON CA SCRN NOT HI RSK IND: HCPCS | Mod: ,,, | Performed by: STUDENT IN AN ORGANIZED HEALTH CARE EDUCATION/TRAINING PROGRAM

## 2022-04-13 PROCEDURE — 25000003 PHARM REV CODE 250: Performed by: NURSE ANESTHETIST, CERTIFIED REGISTERED

## 2022-04-13 PROCEDURE — E9220 PRA ENDO ANESTHESIA: ICD-10-PCS | Mod: ,,, | Performed by: NURSE ANESTHETIST, CERTIFIED REGISTERED

## 2022-04-13 RX ORDER — SODIUM CHLORIDE 9 MG/ML
INJECTION, SOLUTION INTRAVENOUS CONTINUOUS
Status: DISCONTINUED | OUTPATIENT
Start: 2022-04-13 | End: 2022-04-13 | Stop reason: HOSPADM

## 2022-04-13 RX ORDER — PROPOFOL 10 MG/ML
VIAL (ML) INTRAVENOUS
Status: DISCONTINUED | OUTPATIENT
Start: 2022-04-13 | End: 2022-04-13

## 2022-04-13 RX ORDER — PROPOFOL 10 MG/ML
VIAL (ML) INTRAVENOUS CONTINUOUS PRN
Status: DISCONTINUED | OUTPATIENT
Start: 2022-04-13 | End: 2022-04-13

## 2022-04-13 RX ORDER — LIDOCAINE HCL/PF 100 MG/5ML
SYRINGE (ML) INTRAVENOUS
Status: DISCONTINUED | OUTPATIENT
Start: 2022-04-13 | End: 2022-04-13

## 2022-04-13 RX ORDER — SODIUM CHLORIDE 9 MG/ML
INJECTION, SOLUTION INTRAVENOUS CONTINUOUS PRN
Status: DISCONTINUED | OUTPATIENT
Start: 2022-04-13 | End: 2022-04-13

## 2022-04-13 RX ADMIN — Medication 100 MG: at 12:04

## 2022-04-13 RX ADMIN — SODIUM CHLORIDE: 0.9 INJECTION, SOLUTION INTRAVENOUS at 12:04

## 2022-04-13 RX ADMIN — PROPOFOL 150 MCG/KG/MIN: 10 INJECTION, EMULSION INTRAVENOUS at 12:04

## 2022-04-13 RX ADMIN — PROPOFOL 70 MG: 10 INJECTION, EMULSION INTRAVENOUS at 12:04

## 2022-04-13 RX ADMIN — PROPOFOL 50 MG: 10 INJECTION, EMULSION INTRAVENOUS at 12:04

## 2022-04-13 NOTE — ANESTHESIA PREPROCEDURE EVALUATION
04/13/2022  Rafa Crowley is a 52 y.o., male.  Past Medical History:   Diagnosis Date    Bowel obstruction     Partial small bowel obstruction      Past Surgical History:   Procedure Laterality Date    ABDOMINAL SURGERY      knee scope      KNEE SURGERY           Pre-op Assessment    I have reviewed the Patient Summary Reports.     I have reviewed the Nursing Notes.    I have reviewed the Medications.     Review of Systems  Anesthesia Hx:  No problems with previous Anesthesia  Neg history of prior surgery. Denies Family Hx of Anesthesia complications.   Denies Personal Hx of Anesthesia complications.   Hematology/Oncology:  Hematology Normal   Oncology Normal     EENT/Dental:EENT/Dental Normal   Cardiovascular:  Cardiovascular Normal     Pulmonary:   Sleep Apnea    Renal/:  Renal/ Normal     Hepatic/GI:   Liver Disease,    Musculoskeletal:  Musculoskeletal Normal    Neurological:  Neurology Normal    Endocrine:  Endocrine Normal    Dermatological:  Skin Normal    Psych:  Psychiatric Normal           Physical Exam  General: Well nourished    Airway:  Mallampati: II / II  Mouth Opening: Normal  TM Distance: Normal  Tongue: Normal  Neck ROM: Normal ROM    Dental:  Intact    Chest/Lungs:  Clear to auscultation, Normal Respiratory Rate    Heart:  Rate: Normal  Rhythm: Regular Rhythm  Sounds: Normal        Anesthesia Plan  Type of Anesthesia, risks & benefits discussed:    Anesthesia Type: Gen Natural Airway  Intra-op Monitoring Plan: Standard ASA Monitors  Induction:  IV  Informed Consent: Informed consent signed with the Patient and all parties understand the risks and agree with anesthesia plan.  All questions answered.   ASA Score: 2  Day of Surgery Review of History & Physical: H&P Update referred to the surgeon/provider.    Ready For Surgery From Anesthesia Perspective.     .

## 2022-04-13 NOTE — TRANSFER OF CARE
"Anesthesia Transfer of Care Note    Patient: Rafa Crowley    Procedure(s) Performed: Procedure(s) (LRB):  COLONOSCOPY (N/A)    Patient location: PACU    Anesthesia Type: general    Transport from OR: Transported from OR on 100% O2 by closed face mask with adequate spontaneous ventilation    Post pain: adequate analgesia    Post assessment: no apparent anesthetic complications and tolerated procedure well    Post vital signs: stable    Level of consciousness: sedated and responds to stimulation    Nausea/Vomiting: no nausea/vomiting    Complications: none    Transfer of care protocol was followed      Last vitals:   Visit Vitals  BP (!) 148/82 (BP Location: Left arm, Patient Position: Lying)   Pulse 65   Temp 36.9 °C (98.4 °F) (Temporal)   Resp 16   Ht 5' 7" (1.702 m)   Wt 103 kg (227 lb)   SpO2 97%   BMI 35.55 kg/m²     "

## 2022-04-13 NOTE — PROVATION PATIENT INSTRUCTIONS
Discharge Summary/Instructions after an Endoscopic Procedure  Patient Name: Rafa Crowley  Patient MRN: [id number]  Patient YOB: 1970  Wednesday, April 13, 2022  Pitero Madsen MD  Dear patient,  As a result of recent federal legislation (The Federal Cures Act), you may   receive lab or pathology results from your procedure in your [facility]   account before your physician is able to contact you. Your physician or   their representative will relay the results to you with their   recommendations at their soonest availability.  Thank you,  RESTRICTIONS:  During your procedure today, you received medications for sedation.  These   medications may affect your judgment, balance and coordination.  Therefore,   for 24 hours, you have the following restrictions:   - DO NOT drive a car, operate machinery, make legal/financial decisions,   sign important papers or drink alcohol.    ACTIVITY:  Today: no heavy lifting, straining or running due to procedural   sedation/anesthesia.  The following day: return to full activity including work.  DIET:  Eat and drink normally unless instructed otherwise.     TREATMENT FOR COMMON SIDE EFFECTS:  - Mild abdominal pain, nausea, belching, bloating or excessive gas:  rest,   eat lightly and use a heating pad.  - Sore Throat: treat with throat lozenges and/or gargle with warm salt   water.  - Because air was used during the procedure, expelling large amounts of air   from your rectum or belching is normal.  - If a bowel prep was taken, you may not have a bowel movement for 1-3 days.    This is normal.  SYMPTOMS TO WATCH FOR AND REPORT TO YOUR PHYSICIAN:  1. Abdominal pain or bloating, other than gas cramps.  2. Chest pain.  3. Back pain.  4. Signs of infection such as: chills or fever occurring within 24 hours   after the procedure.  5. Rectal bleeding, which would show as bright red, maroon, or black stools.   (A tablespoon of blood from the rectum is not serious,  especially if   hemorrhoids are present.)  6. Vomiting.  7. Weakness or dizziness.  GO DIRECTLY TO THE NEAREST EMERGENCY ROOM IF YOU HAVE ANY OF THE FOLLOWING:      Difficulty breathing              Chills and/or fever over 101 F   Persistent vomiting and/or vomiting blood   Severe abdominal pain   Severe chest pain   Black, tarry stools   Bleeding- more than one tablespoon   Any other symptom or condition that you feel may need urgent attention  Your doctor recommends these additional instructions:  If any biopsies were taken, your doctors clinic will contact you in 1 to 2   weeks with any results.  - Discharge patient to home.   - Resume previous diet.   - Continue present medications.   - Repeat colonoscopy in 10 years for screening purposes.   - Return to referring physician as previously scheduled.  For questions, problems or results please call your physician - Pietro Madsen MD at Work:  [phone].  [facility] EMERGENCY ROOM PHONE NUMBER: [phone]  IF A COMPLICATION OR EMERGENCY SITUATION ARISES AND YOU ARE UNABLE TO REACH   YOUR PHYSICIAN - GO DIRECTLY TO THE EMERGENCY ROOM.  MD Pietro Maradiaga MD  4/13/2022 12:39:17 PM  This report has been verified and signed electronically.  Dear patient,  As a result of recent federal legislation (The Federal Cures Act), you may   receive lab or pathology results from your procedure in your [facility]   account before your physician is able to contact you. Your physician or   their representative will relay the results to you with their   recommendations at their soonest availability.  Thank you,  PROVATION

## 2022-04-13 NOTE — H&P
Endoscopy H&P    Procedure : Colonoscopy   Mr. Crowley is a 51 year old man with no significant medical history who presents for evaluation of recurrent SBO. He was seen by Dr. Baltazar recently for recurrent SBO - she ordered a colonoscopy and MRE for evaluation. He underwent a laparoscopic, converted to open, lysis of adhesions in 2017 by Dr. Arreguin and was recently admitted for non-operative management of a pSBO. He is doing well - prior to his surgery in 2017 he had never had a bowel obstruction. He denies any pain, nausea or vomiting currently.     2017 - Exploratory laparotomy, lysis of adhesions - laparoscopic, converted to open, minimal adhesions described but they did lyse the band, he did not have any other evidence of pathology (operative note reviewed personally and with the patient)     12/17/2021 - CT AP - Images reviewed personally and with the patient, agree with location of transition  1. Mildly distended fluid-filled loops of proximal and mid small bowel suspicious for least partial small bowel obstruction with possible transition point in the right lower quadrant. No evidence of free intraperitoneal air or pneumatosis.     12/18/2021 - Small bowel follow through  No evidence of complete obstruction - antegrade contrast appreciated     No prior colonoscopy    Interval:  Presents today for colonoscopy. Reports some irritation of hemorrhoids with bleeding with wiping during the prep     FHx: negative for colon or rectal cancer. + for Urologic cancer in mother and father     Past Medical History:   Diagnosis Date    Bowel obstruction     Partial small bowel obstruction              Review of Systems -ROS:  GENERAL: No fever, chills, fatigability or weight loss.  CHEST: Denies DUMONT, cyanosis, wheezing, cough and sputum production.  CARDIOVASCULAR: Denies chest pain, PND, orthopnea or reduced exercise tolerance.   Musculoskeletal ROS: negative for - gait disturbance or joint pain  Neurological ROS:  negative for - confusion or memory loss        Physical Exam:  General: well developed, well nourished, no distress  Head: normocephalic  Neck: supple, symmetrical, trachea midline  Lungs:  clear to auscultation bilaterally and normal respiratory effort  Heart: regular rate and rhythm, S1, S2 normal, no murmur, rub or gallop and regular rate and rhythm  Abdomen: soft, non-tender non-distented; bowel sounds normal; no masses,  no organomegaly  Extremities: no cyanosis or edema, or clubbing       Moderate Sedation (choice): Mallampati Score 1    ASA : II      Assessment and Plan of Care     Thank you again for referring Mr. Crowley to my care. In summary, Mr. Crowley is a 51 year old man presenting with a recurrent pSBO. We discussed treatment options and have provided the following recommendations:     Plan: Colonoscopy with Moderate sedation.  I have explained the procedure including indications, alternatives, expected outcomes and potential complications. The patient appears to understand and gives informed consent. The patient is medically ready for surgery.

## 2022-04-13 NOTE — ANESTHESIA POSTPROCEDURE EVALUATION
Anesthesia Post Evaluation    Patient: Rafa Crowley    Procedure(s) Performed: Procedure(s) (LRB):  COLONOSCOPY (N/A)    Final Anesthesia Type: general      Patient location during evaluation: PACU  Patient participation: Yes- Able to Participate  Level of consciousness: awake and alert  Post-procedure vital signs: reviewed and stable  Pain management: adequate  Airway patency: patent    PONV status at discharge: No PONV  Anesthetic complications: no      Cardiovascular status: hemodynamically stable  Respiratory status: spontaneous ventilation  Follow-up not needed.          Vitals Value Taken Time   /66 04/13/22 1240   Temp 36.7 °C (98.1 °F) 04/13/22 1240   Pulse 63 04/13/22 1240   Resp 16 04/13/22 1240   SpO2 97 % 04/13/22 1240         No case tracking events are documented in the log.      Pain/Emelyn Score: Emelyn Score: 9 (4/13/2022 12:48 PM)

## 2022-05-30 ENCOUNTER — PATIENT MESSAGE (OUTPATIENT)
Dept: ADMINISTRATIVE | Facility: HOSPITAL | Age: 52
End: 2022-05-30
Payer: COMMERCIAL

## 2022-06-01 ENCOUNTER — OFFICE VISIT (OUTPATIENT)
Dept: CARDIOLOGY | Facility: CLINIC | Age: 52
End: 2022-06-01
Payer: COMMERCIAL

## 2022-06-01 VITALS
SYSTOLIC BLOOD PRESSURE: 126 MMHG | DIASTOLIC BLOOD PRESSURE: 66 MMHG | RESPIRATION RATE: 15 BRPM | HEART RATE: 71 BPM | WEIGHT: 220.44 LBS | HEIGHT: 67 IN | OXYGEN SATURATION: 98 % | BODY MASS INDEX: 34.6 KG/M2

## 2022-06-01 DIAGNOSIS — T46.6X5A STATIN MYOPATHY: ICD-10-CM

## 2022-06-01 DIAGNOSIS — Z13.6 ENCOUNTER FOR SCREENING FOR CARDIOVASCULAR DISORDERS: ICD-10-CM

## 2022-06-01 DIAGNOSIS — Z82.49 FAMILY HISTORY OF PREMATURE CORONARY ARTERY DISEASE: Primary | ICD-10-CM

## 2022-06-01 DIAGNOSIS — K76.0 FATTY LIVER: ICD-10-CM

## 2022-06-01 DIAGNOSIS — E66.09 CLASS 1 OBESITY DUE TO EXCESS CALORIES WITH SERIOUS COMORBIDITY AND BODY MASS INDEX (BMI) OF 34.0 TO 34.9 IN ADULT: ICD-10-CM

## 2022-06-01 DIAGNOSIS — G47.33 OSA (OBSTRUCTIVE SLEEP APNEA): ICD-10-CM

## 2022-06-01 DIAGNOSIS — G72.0 STATIN MYOPATHY: ICD-10-CM

## 2022-06-01 DIAGNOSIS — E78.2 MIXED HYPERLIPIDEMIA: ICD-10-CM

## 2022-06-01 PROCEDURE — 3008F BODY MASS INDEX DOCD: CPT | Mod: CPTII,S$GLB,, | Performed by: INTERNAL MEDICINE

## 2022-06-01 PROCEDURE — 1159F PR MEDICATION LIST DOCUMENTED IN MEDICAL RECORD: ICD-10-PCS | Mod: CPTII,S$GLB,, | Performed by: INTERNAL MEDICINE

## 2022-06-01 PROCEDURE — 3008F PR BODY MASS INDEX (BMI) DOCUMENTED: ICD-10-PCS | Mod: CPTII,S$GLB,, | Performed by: INTERNAL MEDICINE

## 2022-06-01 PROCEDURE — 3078F DIAST BP <80 MM HG: CPT | Mod: CPTII,S$GLB,, | Performed by: INTERNAL MEDICINE

## 2022-06-01 PROCEDURE — 93000 EKG 12-LEAD: ICD-10-PCS | Mod: S$GLB,,, | Performed by: INTERNAL MEDICINE

## 2022-06-01 PROCEDURE — 3078F PR MOST RECENT DIASTOLIC BLOOD PRESSURE < 80 MM HG: ICD-10-PCS | Mod: CPTII,S$GLB,, | Performed by: INTERNAL MEDICINE

## 2022-06-01 PROCEDURE — 1159F MED LIST DOCD IN RCRD: CPT | Mod: CPTII,S$GLB,, | Performed by: INTERNAL MEDICINE

## 2022-06-01 PROCEDURE — 3074F SYST BP LT 130 MM HG: CPT | Mod: CPTII,S$GLB,, | Performed by: INTERNAL MEDICINE

## 2022-06-01 PROCEDURE — 99999 PR PBB SHADOW E&M-EST. PATIENT-LVL III: CPT | Mod: PBBFAC,,, | Performed by: INTERNAL MEDICINE

## 2022-06-01 PROCEDURE — 99203 OFFICE O/P NEW LOW 30 MIN: CPT | Mod: 25,S$GLB,, | Performed by: INTERNAL MEDICINE

## 2022-06-01 PROCEDURE — 99999 PR PBB SHADOW E&M-EST. PATIENT-LVL III: ICD-10-PCS | Mod: PBBFAC,,, | Performed by: INTERNAL MEDICINE

## 2022-06-01 PROCEDURE — 93000 ELECTROCARDIOGRAM COMPLETE: CPT | Mod: S$GLB,,, | Performed by: INTERNAL MEDICINE

## 2022-06-01 PROCEDURE — 99203 PR OFFICE/OUTPT VISIT, NEW, LEVL III, 30-44 MIN: ICD-10-PCS | Mod: 25,S$GLB,, | Performed by: INTERNAL MEDICINE

## 2022-06-01 PROCEDURE — 3074F PR MOST RECENT SYSTOLIC BLOOD PRESSURE < 130 MM HG: ICD-10-PCS | Mod: CPTII,S$GLB,, | Performed by: INTERNAL MEDICINE

## 2022-06-01 NOTE — PROGRESS NOTES
CARDIOLOGY CONSULTATION    REASON FOR CONSULT:   Rafa Crowley is a 52 y.o. male who presents for cardiovascular evaluation.      HISTORY OF PRESENT ILLNESS:     Raaf Crowley present for cardiovascular evaluation.  Family history of premature coronary artery disease.  History of hyperlipidemia.  Statin myopathy.  EKG today shows normal sinus rhythm.  Denies chest pain.  No shortness of breath.  Relatively active.    CARDIOVASCULAR HISTORY:     None    PAST MEDICAL HISTORY:     Past Medical History:   Diagnosis Date    Bowel obstruction     Partial small bowel obstruction        PAST SURGICAL HISTORY:     Past Surgical History:   Procedure Laterality Date    ABDOMINAL SURGERY      COLONOSCOPY N/A 4/13/2022    Procedure: COLONOSCOPY;  Surgeon: Pietro Madsen MD;  Location: [facility] ([address] [place] [address]);  Service: Endoscopy;  Laterality: N/A;  fully vaccinated    knee scope      KNEE SURGERY         ALLERGIES AND MEDICATION:     Review of patient's allergies indicates:   Allergen Reactions    Penicillins         Medication List      as of June 1, 2022 11:40 AM     You have not been prescribed any medications.         SOCIAL HISTORY:     Social History     Socioeconomic History    Marital status: Single   Tobacco Use    Smoking status: Never Smoker    Smokeless tobacco: Former User     Quit date: 3/13/2017   Substance and Sexual Activity    Alcohol use: Yes     Comment: socially    Drug use: No    Sexual activity: Yes     Partners: Female       FAMILY HISTORY:     Family History   Problem Relation Age of Onset    Cancer Mother         kidney    Cancer Father         bladder    Cancer Maternal Aunt         kidney    Inflammatory bowel disease Neg Hx     Colon cancer Neg Hx        REVIEW OF SYSTEMS:   Review of Systems   Constitutional: Negative for chills, diaphoresis, fever, malaise/fatigue and weight loss.   Eyes: Negative for blurred vision and pain.   Respiratory: Negative for sputum production,  "shortness of breath and wheezing.    Cardiovascular: Negative for chest pain, palpitations, orthopnea, claudication, leg swelling and PND.   Gastrointestinal: Negative for abdominal pain, heartburn, nausea and vomiting.   Musculoskeletal: Negative for back pain, falls, joint pain, myalgias and neck pain.   Neurological: Negative for dizziness, speech change, focal weakness, loss of consciousness, weakness and headaches.   Endo/Heme/Allergies: Does not bruise/bleed easily.   Psychiatric/Behavioral: Negative for depression, memory loss and substance abuse. The patient is not nervous/anxious.        PHYSICAL EXAM:     Vitals:    06/01/22 1114   BP: 126/66   Pulse: 71   Resp: 15    Body mass index is 34.53 kg/m².  Weight: 100 kg (220 lb 7.4 oz)   Height: 5' 7" (170.2 cm)     Physical Exam  Vitals reviewed.   Constitutional:       General: He is not in acute distress.     Appearance: He is well-developed and overweight. He is not diaphoretic.   Neck:      Vascular: No carotid bruit or JVD.   Cardiovascular:      Rate and Rhythm: Normal rate and regular rhythm.      Pulses: Normal pulses.      Heart sounds: Normal heart sounds.   Pulmonary:      Effort: Pulmonary effort is normal.      Breath sounds: Normal breath sounds.   Abdominal:      General: Bowel sounds are normal.      Palpations: Abdomen is soft.      Tenderness: There is no abdominal tenderness.   Musculoskeletal:      Right lower leg: No edema.      Left lower leg: No edema.   Neurological:      Mental Status: He is alert and oriented to person, place, and time.   Psychiatric:         Speech: Speech normal.         Behavior: Behavior normal.         DATA:   EKG: (personally reviewed tracing)  06/01/2022-normal sinus rhythm  Laboratory:  CBC:  Recent Labs   Lab 08/06/19  1512 12/18/21  0338 12/19/21  0445   WBC 6.08 4.49 4.47   Hemoglobin 14.6 14.4 13.0 L   Hematocrit 45.4 44.3 40.2   Platelets 254 190 153       CHEMISTRIES:  Recent Labs   Lab 08/06/19  1511 " 12/18/21  0338 12/19/21  0444   Glucose 100 99 89   Sodium 140 138 139   Potassium 4.4 3.8 3.8   BUN 16 16 17   Creatinine 1.0 0.9 0.8   eGFR if African American >60 >60 >60   eGFR if non African American >60 >60 >60   Calcium 9.8 8.7 8.3 L   Magnesium  --  1.9 2.0       CARDIAC BIOMARKERS:        COAGS:        LIPIDS/LFTS:  Recent Labs   Lab 08/06/19  1511   Cholesterol 261 H   Triglycerides 81   HDL 60   LDL Cholesterol 184.8 H   Non-HDL Cholesterol 201   AST 20   ALT 27       Cardiovascular Testing:      ASSESSMENT:     1. Family history of premature coronary artery disease  2. Hyperlipidemia  3. Statin myopathy  4. Obesity  5. Hepatic steatosis  6. Encounter for screening for cardiovascular disorders     PLAN:     1. Family history of premature coronary artery disease/ Encounter for screening for cardiovascular disorders:  2D echocardiogram to assess structure and function.  Exercise stress for evaluation. Baseline labs.  2. Hyperlipidemia:  Follow-up lipids  3. Return to clinic 1 month.               Shaun Lerma MD, MPH, FACC, [facility]

## 2022-06-15 ENCOUNTER — HOSPITAL ENCOUNTER (OUTPATIENT)
Dept: CARDIOLOGY | Facility: HOSPITAL | Age: 52
Discharge: HOME | End: 2022-06-15
Attending: INTERNAL MEDICINE
Payer: COMMERCIAL

## 2022-06-15 VITALS — HEIGHT: 67 IN | WEIGHT: 220 LBS | BODY MASS INDEX: 34.53 KG/M2

## 2022-06-15 DIAGNOSIS — E66.09 CLASS 1 OBESITY DUE TO EXCESS CALORIES WITH SERIOUS COMORBIDITY AND BODY MASS INDEX (BMI) OF 34.0 TO 34.9 IN ADULT: ICD-10-CM

## 2022-06-15 DIAGNOSIS — Z82.49 FAMILY HISTORY OF PREMATURE CORONARY ARTERY DISEASE: ICD-10-CM

## 2022-06-15 DIAGNOSIS — E78.2 MIXED HYPERLIPIDEMIA: ICD-10-CM

## 2022-06-15 DIAGNOSIS — Z13.6 ENCOUNTER FOR SCREENING FOR CARDIOVASCULAR DISORDERS: ICD-10-CM

## 2022-06-15 LAB
ASCENDING AORTA: 3.24 CM
AV INDEX (PROSTH): 0.66
AV MEAN GRADIENT: 3 MMHG
AV PEAK GRADIENT: 5 MMHG
AV VALVE AREA: 3.45 CM2
AV VELOCITY RATIO: 0.7
CV ECHO LV RWT: 0.46 CM
CV STRESS BASE HR: 62 BPM
DIASTOLIC BLOOD PRESSURE: 79 MMHG
DOP CALC AO PEAK VEL: 1.1 M/S
DOP CALC AO VTI: 27.22 CM
DOP CALC LVOT AREA: 5.3 CM2
DOP CALC LVOT DIAMETER: 2.59 CM
DOP CALC LVOT PEAK VEL: 0.77 M/S
DOP CALC LVOT STROKE VOLUME: 93.89 CM3
DOP CALCLVOT PEAK VEL VTI: 17.83 CM
E WAVE DECELERATION TIME: 182.93 MSEC
E/A RATIO: 1.41
E/E' RATIO: 7.2 M/S
ECHO LV POSTERIOR WALL: 1.18 CM (ref 0.6–1.1)
EJECTION FRACTION: 60 %
FRACTIONAL SHORTENING: 34 % (ref 28–44)
INTERVENTRICULAR SEPTUM: 1.17 CM (ref 0.6–1.1)
IVRT: 105.61 MSEC
LA MAJOR: 5.65 CM
LA MINOR: 5.49 CM
LA WIDTH: 3.57 CM
LEFT ATRIUM SIZE: 4.07 CM
LEFT ATRIUM VOLUME: 68.78 CM3
LEFT INTERNAL DIMENSION IN SYSTOLE: 3.38 CM (ref 2.1–4)
LEFT VENTRICLE DIASTOLIC VOLUME: 124.66 ML
LEFT VENTRICLE SYSTOLIC VOLUME: 46.8 ML
LEFT VENTRICULAR INTERNAL DIMENSION IN DIASTOLE: 5.12 CM (ref 3.5–6)
LEFT VENTRICULAR MASS: 235.74 G
LV LATERAL E/E' RATIO: 6.55 M/S
LV SEPTAL E/E' RATIO: 8 M/S
MV PEAK A VEL: 0.51 M/S
MV PEAK E VEL: 0.72 M/S
MV STENOSIS PRESSURE HALF TIME: 53.05 MS
MV VALVE AREA P 1/2 METHOD: 4.15 CM2
OHS CV CPX 1 MINUTE RECOVERY HEART RATE: 148 BPM
OHS CV CPX 85 PERCENT MAX PREDICTED HEART RATE MALE: 143
OHS CV CPX ESTIMATED METS: 11.2
OHS CV CPX MAX PREDICTED HEART RATE: 168
OHS CV CPX PATIENT IS FEMALE: 0
OHS CV CPX PATIENT IS MALE: 1
OHS CV CPX PEAK DIASTOLIC BLOOD PRESSURE: 61 MMHG
OHS CV CPX PEAK HEAR RATE: 169 BPM
OHS CV CPX PEAK RATE PRESSURE PRODUCT: NORMAL
OHS CV CPX PEAK SYSTOLIC BLOOD PRESSURE: 202 MMHG
OHS CV CPX PERCENT MAX PREDICTED HEART RATE ACHIEVED: 101
OHS CV CPX RATE PRESSURE PRODUCT PRESENTING: 7998
PISA TR MAX VEL: 2.01 M/S
PULM VEIN S/D RATIO: 1.38
PV PEAK D VEL: 0.34 M/S
PV PEAK S VEL: 0.47 M/S
PV PEAK VELOCITY: 1.06 CM/S
RA MAJOR: 5.8 CM
RA PRESSURE: 3 MMHG
RA WIDTH: 3.79 CM
RIGHT VENTRICULAR END-DIASTOLIC DIMENSION: 3.87 CM
RV TISSUE DOPPLER FREE WALL SYSTOLIC VELOCITY 1 (APICAL 4 CHAMBER VIEW): 9.79 CM/S
STJ: 2.29 CM
STRESS ECHO POST EXERCISE DUR MIN: 9 MINUTES
STRESS ECHO POST EXERCISE DUR SEC: 42 SECONDS
SYSTOLIC BLOOD PRESSURE: 129 MMHG
TDI LATERAL: 0.11 M/S
TDI SEPTAL: 0.09 M/S
TDI: 0.1 M/S
TR MAX PG: 16 MMHG
TRICUSPID ANNULAR PLANE SYSTOLIC EXCURSION: 2.29 CM
TV REST PULMONARY ARTERY PRESSURE: 19 MMHG

## 2022-06-15 PROCEDURE — 93306 TTE W/DOPPLER COMPLETE: CPT | Mod: 26,,, | Performed by: INTERNAL MEDICINE

## 2022-06-15 PROCEDURE — 93306 TTE W/DOPPLER COMPLETE: CPT

## 2022-06-15 PROCEDURE — 93018 EXERCISE STRESS - EKG (CUPID ONLY): ICD-10-PCS | Mod: ,,, | Performed by: INTERNAL MEDICINE

## 2022-06-15 PROCEDURE — 93017 CV STRESS TEST TRACING ONLY: CPT

## 2022-06-15 PROCEDURE — 93306 ECHO (CUPID ONLY): ICD-10-PCS | Mod: 26,,, | Performed by: INTERNAL MEDICINE

## 2022-06-15 PROCEDURE — 93016 EXERCISE STRESS - EKG (CUPID ONLY): ICD-10-PCS | Mod: ,,, | Performed by: INTERNAL MEDICINE

## 2022-06-15 PROCEDURE — 93016 CV STRESS TEST SUPVJ ONLY: CPT | Mod: ,,, | Performed by: INTERNAL MEDICINE

## 2022-06-15 PROCEDURE — 93018 CV STRESS TEST I&R ONLY: CPT | Mod: ,,, | Performed by: INTERNAL MEDICINE

## 2022-07-15 ENCOUNTER — OFFICE VISIT (OUTPATIENT)
Dept: CARDIOLOGY | Facility: CLINIC | Age: 52
End: 2022-07-15
Payer: COMMERCIAL

## 2022-07-15 VITALS
HEART RATE: 68 BPM | OXYGEN SATURATION: 98 % | WEIGHT: 221.88 LBS | HEIGHT: 67 IN | SYSTOLIC BLOOD PRESSURE: 122 MMHG | RESPIRATION RATE: 18 BRPM | DIASTOLIC BLOOD PRESSURE: 74 MMHG | BODY MASS INDEX: 34.82 KG/M2

## 2022-07-15 DIAGNOSIS — T46.6X5A STATIN MYOPATHY: ICD-10-CM

## 2022-07-15 DIAGNOSIS — E78.2 MIXED HYPERLIPIDEMIA: ICD-10-CM

## 2022-07-15 DIAGNOSIS — Z82.49 FAMILY HISTORY OF PREMATURE CORONARY ARTERY DISEASE: Primary | ICD-10-CM

## 2022-07-15 DIAGNOSIS — K76.0 FATTY LIVER: ICD-10-CM

## 2022-07-15 DIAGNOSIS — G72.0 STATIN MYOPATHY: ICD-10-CM

## 2022-07-15 DIAGNOSIS — E66.09 CLASS 1 OBESITY DUE TO EXCESS CALORIES WITH SERIOUS COMORBIDITY AND BODY MASS INDEX (BMI) OF 34.0 TO 34.9 IN ADULT: ICD-10-CM

## 2022-07-15 DIAGNOSIS — Z13.6 ENCOUNTER FOR SCREENING FOR CARDIOVASCULAR DISORDERS: ICD-10-CM

## 2022-07-15 PROCEDURE — 99999 PR PBB SHADOW E&M-EST. PATIENT-LVL III: ICD-10-PCS | Mod: PBBFAC,,, | Performed by: INTERNAL MEDICINE

## 2022-07-15 PROCEDURE — 3074F PR MOST RECENT SYSTOLIC BLOOD PRESSURE < 130 MM HG: ICD-10-PCS | Mod: CPTII,S$GLB,, | Performed by: INTERNAL MEDICINE

## 2022-07-15 PROCEDURE — 3008F BODY MASS INDEX DOCD: CPT | Mod: CPTII,S$GLB,, | Performed by: INTERNAL MEDICINE

## 2022-07-15 PROCEDURE — 1159F MED LIST DOCD IN RCRD: CPT | Mod: CPTII,S$GLB,, | Performed by: INTERNAL MEDICINE

## 2022-07-15 PROCEDURE — 99214 OFFICE O/P EST MOD 30 MIN: CPT | Mod: S$GLB,,, | Performed by: INTERNAL MEDICINE

## 2022-07-15 PROCEDURE — 1159F PR MEDICATION LIST DOCUMENTED IN MEDICAL RECORD: ICD-10-PCS | Mod: CPTII,S$GLB,, | Performed by: INTERNAL MEDICINE

## 2022-07-15 PROCEDURE — 3078F PR MOST RECENT DIASTOLIC BLOOD PRESSURE < 80 MM HG: ICD-10-PCS | Mod: CPTII,S$GLB,, | Performed by: INTERNAL MEDICINE

## 2022-07-15 PROCEDURE — 3008F PR BODY MASS INDEX (BMI) DOCUMENTED: ICD-10-PCS | Mod: CPTII,S$GLB,, | Performed by: INTERNAL MEDICINE

## 2022-07-15 PROCEDURE — 1160F PR REVIEW ALL MEDS BY PRESCRIBER/CLIN PHARMACIST DOCUMENTED: ICD-10-PCS | Mod: CPTII,S$GLB,, | Performed by: INTERNAL MEDICINE

## 2022-07-15 PROCEDURE — 99999 PR PBB SHADOW E&M-EST. PATIENT-LVL III: CPT | Mod: PBBFAC,,, | Performed by: INTERNAL MEDICINE

## 2022-07-15 PROCEDURE — 1160F RVW MEDS BY RX/DR IN RCRD: CPT | Mod: CPTII,S$GLB,, | Performed by: INTERNAL MEDICINE

## 2022-07-15 PROCEDURE — 3078F DIAST BP <80 MM HG: CPT | Mod: CPTII,S$GLB,, | Performed by: INTERNAL MEDICINE

## 2022-07-15 PROCEDURE — 3074F SYST BP LT 130 MM HG: CPT | Mod: CPTII,S$GLB,, | Performed by: INTERNAL MEDICINE

## 2022-07-15 PROCEDURE — 99214 PR OFFICE/OUTPT VISIT, EST, LEVL IV, 30-39 MIN: ICD-10-PCS | Mod: S$GLB,,, | Performed by: INTERNAL MEDICINE

## 2022-07-15 NOTE — PROGRESS NOTES
CARDIOLOGY CLINIC VISIT        HISTORY OF PRESENT ILLNESS:     Rafa Crowley present for continued care. Seen 6/1/22 for cardiovascular evaluation.  Family history of premature coronary artery disease.  History of hyperlipidemia.  Statin myopathy.  EKG today shows normal sinus rhythm.  Denies chest pain.  No shortness of breath.  Relatively active.    07/15/2022:  Echocardiogram showed normal left ventricular systolic function with estimated ejection fraction of 60%.  Left ventricular hypertrophy.  Normal pulmonary pressure.  Exercise EKG was negative for ischemia. Patient exercised for 9 minutes 42 seconds, functional capacity 11.2 Mets.  Peak blood pressure 202/61.    CARDIOVASCULAR HISTORY:     None    PAST MEDICAL HISTORY:     Past Medical History:   Diagnosis Date    Bowel obstruction     Partial small bowel obstruction        PAST SURGICAL HISTORY:     Past Surgical History:   Procedure Laterality Date    ABDOMINAL SURGERY      COLONOSCOPY N/A 4/13/2022    Procedure: COLONOSCOPY;  Surgeon: Pietro Madsen MD;  Location: [address]);  Service: Endoscopy;  Laterality: N/A;  fully vaccinated    knee scope      KNEE SURGERY         ALLERGIES AND MEDICATION:     Review of patient's allergies indicates:   Allergen Reactions    Penicillins         Medication List      as of July 15, 2022  1:35 PM     You have not been prescribed any medications.         SOCIAL HISTORY:     Social History     Socioeconomic History    Marital status: Single   Tobacco Use    Smoking status: Never Smoker    Smokeless tobacco: Former User     Quit date: 3/13/2017   Substance and Sexual Activity    Alcohol use: Yes     Comment: socially    Drug use: No    Sexual activity: Yes     Partners: Female       FAMILY HISTORY:     Family History   Problem Relation Age of Onset    Cancer Mother         kidney    Cancer Father         bladder    Cancer Maternal Aunt         kidney    Inflammatory bowel disease Neg Hx   "   Colon cancer Neg Hx        REVIEW OF SYSTEMS:   Review of Systems   Constitutional: Negative for chills, diaphoresis, fever, malaise/fatigue and weight loss.   Eyes: Negative for blurred vision and pain.   Respiratory: Negative for sputum production, shortness of breath and wheezing.    Cardiovascular: Negative for chest pain, palpitations, orthopnea, claudication, leg swelling and PND.   Gastrointestinal: Negative for abdominal pain, heartburn, nausea and vomiting.   Musculoskeletal: Negative for back pain, falls, joint pain, myalgias and neck pain.   Neurological: Negative for dizziness, speech change, focal weakness, loss of consciousness, weakness and headaches.   Endo/Heme/Allergies: Does not bruise/bleed easily.   Psychiatric/Behavioral: Negative for depression, memory loss and substance abuse. The patient is not nervous/anxious.        PHYSICAL EXAM:     Vitals:    07/15/22 1027   BP: 122/74   Pulse: 68   Resp: 18    Body mass index is 34.75 kg/m².  Weight: 100.7 kg (221 lb 14.3 oz)   Height: 5' 7" (170.2 cm)     Physical Exam  Vitals reviewed.   Constitutional:       General: He is not in acute distress.     Appearance: He is well-developed and overweight. He is not diaphoretic.   Neck:      Vascular: No carotid bruit or JVD.   Cardiovascular:      Rate and Rhythm: Normal rate and regular rhythm.      Pulses: Normal pulses.      Heart sounds: Normal heart sounds.   Pulmonary:      Effort: Pulmonary effort is normal.      Breath sounds: Normal breath sounds.   Abdominal:      General: Bowel sounds are normal.      Palpations: Abdomen is soft.      Tenderness: There is no abdominal tenderness.   Musculoskeletal:      Right lower leg: No edema.      Left lower leg: No edema.   Neurological:      Mental Status: He is alert and oriented to person, place, and time.   Psychiatric:         Speech: Speech normal.         Behavior: Behavior normal.         DATA:   EKG: (personally reviewed " tracing)  06/01/2022-normal sinus rhythm  Laboratory:  CBC:  Recent Labs   Lab 12/18/21  0338 12/19/21  0445 06/15/22  0743   WBC 4.49 4.47 5.54   Hemoglobin 14.4 13.0 L 14.1   Hematocrit 44.3 40.2 42.4   Platelets 190 153 205       CHEMISTRIES:  Recent Labs   Lab 12/18/21  0338 12/19/21  0444 06/15/22  0743   Glucose 99 89 111 H   Sodium 138 139 140   Potassium 3.8 3.8 4.5   BUN 16 17 14   Creatinine 0.9 0.8 0.9   eGFR if  >60 >60 >60   eGFR if non African American >60 >60 >60   Calcium 8.7 8.3 L 9.7   Magnesium 1.9 2.0  --        CARDIAC BIOMARKERS:        COAGS:        LIPIDS/LFTS:  Recent Labs   Lab 08/06/19  1511 06/15/22  0743   Cholesterol 261 H 236 H   Triglycerides 81 119   HDL 60 59   LDL Cholesterol 184.8 H 153.2   Non-HDL Cholesterol 201 177   AST 20 20   ALT 27 22       Cardiovascular Testing:    Echocardiogram 06/15/2022:    · The left ventricle is normal in size with concentric hypertrophy and normal systolic function.  · The estimated ejection fraction is 60%.  · Normal left ventricular diastolic function.  · Normal right ventricular size with normal right ventricular systolic function.  · Normal central venous pressure (3 mmHg).  · The estimated PA systolic pressure is 19 mmHg.    Exercise EKG 06/15/2022:      The EKG portion of this study is negative for ischemia.    The patient reported no chest pain during the stress test.    During stress, rare PVCs are noted.    The exercise capacity was above average.         ASSESSMENT:     1. Family history of premature coronary artery disease  2. Hyperlipidemia  3. Statin myopathy  4. Obesity  5. Hepatic steatosis  6. Encounter for screening for cardiovascular disorders     PLAN:     1. Family history of premature coronary artery disease/ Encounter for screening for cardiovascular disorders:  2D echocardiogram showed normal function.  Stress EKG negative for ischemia.  Good exercise capacity.  2. Hyperlipidemia:  Follow-up lipids with  . Monitor.  3. Return to clinic 1 year.           Shaun Lerma MD, MPH, FACC, [facility]

## 2022-07-16 ENCOUNTER — HOSPITAL ENCOUNTER (EMERGENCY)
Facility: HOSPITAL | Age: 52
Discharge: HOME | End: 2022-07-16
Attending: INTERNAL MEDICINE
Payer: COMMERCIAL

## 2022-07-16 VITALS
SYSTOLIC BLOOD PRESSURE: 140 MMHG | RESPIRATION RATE: 15 BRPM | HEIGHT: 67 IN | TEMPERATURE: 99 F | WEIGHT: 220 LBS | DIASTOLIC BLOOD PRESSURE: 81 MMHG | OXYGEN SATURATION: 99 % | HEART RATE: 66 BPM | BODY MASS INDEX: 34.53 KG/M2

## 2022-07-16 DIAGNOSIS — H10.9 CONJUNCTIVITIS OF RIGHT EYE, UNSPECIFIED CONJUNCTIVITIS TYPE: Primary | ICD-10-CM

## 2022-07-16 PROCEDURE — 63600175 PHARM REV CODE 636 W HCPCS: Mod: ER | Performed by: NURSE PRACTITIONER

## 2022-07-16 PROCEDURE — 99284 EMERGENCY DEPT VISIT MOD MDM: CPT | Mod: 25,ER

## 2022-07-16 PROCEDURE — 90715 TDAP VACCINE 7 YRS/> IM: CPT | Mod: ER | Performed by: NURSE PRACTITIONER

## 2022-07-16 PROCEDURE — 25000003 PHARM REV CODE 250: Mod: ER | Performed by: NURSE PRACTITIONER

## 2022-07-16 PROCEDURE — 90471 IMMUNIZATION ADMIN: CPT | Mod: ER | Performed by: NURSE PRACTITIONER

## 2022-07-16 RX ORDER — TOBRAMYCIN AND DEXAMETHASONE 3; 1 MG/ML; MG/ML
2 SUSPENSION/ DROPS OPHTHALMIC
Qty: 5 ML | Refills: 0 | Status: SHIPPED | OUTPATIENT
Start: 2022-07-16 | End: 2022-07-21

## 2022-07-16 RX ORDER — PROPARACAINE HYDROCHLORIDE 5 MG/ML
1 SOLUTION/ DROPS OPHTHALMIC
Status: COMPLETED | OUTPATIENT
Start: 2022-07-16 | End: 2022-07-16

## 2022-07-16 RX ADMIN — TETANUS TOXOID, REDUCED DIPHTHERIA TOXOID AND ACELLULAR PERTUSSIS VACCINE, ADSORBED 0.5 ML: 5; 2.5; 8; 8; 2.5 SUSPENSION INTRAMUSCULAR at 11:07

## 2022-07-16 RX ADMIN — FLUORESCEIN SODIUM 1 EACH: 1 STRIP OPHTHALMIC at 08:07

## 2022-07-16 RX ADMIN — PROPARACAINE HYDROCHLORIDE 1 DROP: 5 SOLUTION/ DROPS OPHTHALMIC at 08:07

## 2022-07-17 NOTE — ED PROVIDER NOTES
Encounter Date: 7/16/2022    SCRIBE #1 NOTE: I, Brando Edmond, am scribing for, and in the presence of,  Dr. Sandy. I have scribed the following portions of the note - Other sections scribed: HPI, ROS, PE.       History     Chief Complaint   Patient presents with    Foreign Body in Eye     Reports working in the yard and something getting in his right eye. Still feels like something is still in his eye. Reports that he was wearing safety glasses while doing work, but he had just taken them off. Reports using eye solution to wash eye out, but no relief. Denies vision disturbance. Reports feeling irritated      Rafa Crowley is a 52 y.o. male who presents to the ED for evaluation of foreign body sensation to right eye with redness and tearing onset this afternoon from cutting his grass. Patient attempted to use eye solution to wash his eye out but endorses no relief. Patient denies visual disturbance and has no other complaints at the present time.      The history is provided by the patient. No  was used.     Review of patient's allergies indicates:   Allergen Reactions    Penicillins Hives     Past Medical History:   Diagnosis Date    Bowel obstruction     Partial small bowel obstruction      Past Surgical History:   Procedure Laterality Date    ABDOMINAL SURGERY      COLONOSCOPY N/A 4/13/2022    Procedure: COLONOSCOPY;  Surgeon: Pietro Madsen MD;  Location: [facility] ([address] [place] [address]);  Service: Endoscopy;  Laterality: N/A;  fully vaccinated    knee scope      KNEE SURGERY       Family History   Problem Relation Age of Onset    Cancer Mother         kidney    Cancer Father         bladder    Cancer Maternal Aunt         kidney    Inflammatory bowel disease Neg Hx     Colon cancer Neg Hx      Social History     Tobacco Use    Smoking status: Never Smoker    Smokeless tobacco: Former User     Quit date: 3/13/2017   Substance Use Topics    Alcohol use: Yes     Comment: socially     Drug use: No     Review of Systems   Constitutional: Negative for fever.   HENT: Negative for rhinorrhea.    Eyes: Positive for redness. Negative for visual disturbance.        (+) eye tearing   Respiratory: Negative for shortness of breath.    Cardiovascular: Negative for chest pain.   Gastrointestinal: Negative for vomiting.   Skin: Negative for rash.       Physical Exam     Initial Vitals [07/16/22 2005]   BP Pulse Resp Temp SpO2   118/76 94 19 98.8 °F (37.1 °C) 96 %      MAP       --         Physical Exam    Nursing note and vitals reviewed.  Constitutional: He appears well-developed and well-nourished.   HENT:   Head: Normocephalic and atraumatic.   Eyes: Right conjunctiva is injected.   Slit lamp exam:       The right eye shows no corneal abrasion.   Right conjunctival injection with no evidence of foreign body.   Neck: Neck supple.   Normal range of motion.  Cardiovascular: Normal rate, regular rhythm and normal heart sounds. Exam reveals no gallop and no friction rub.    No murmur heard.  Pulmonary/Chest: Breath sounds normal. No respiratory distress. He has no wheezes. He has no rhonchi. He has no rales.   Abdominal: Abdomen is soft. There is no abdominal tenderness.   Musculoskeletal:         General: No edema. Normal range of motion.      Cervical back: Normal range of motion and neck supple.     Neurological: He is alert and oriented to person, place, and time. GCS score is 15. GCS eye subscore is 4. GCS verbal subscore is 5. GCS motor subscore is 6.   Skin: Skin is warm and dry.   Psychiatric: He has a normal mood and affect.         ED Course   Procedures  Labs Reviewed - No data to display       Imaging Results    None          Medications   fluorescein ophthalmic strip 1 each (1 each Both Eyes Given 7/16/22 2045)   proparacaine 0.5 % ophthalmic solution 1 drop (1 drop Both Eyes Given 7/16/22 2045)   Tdap (BOOSTRIX) vaccine injection 0.5 mL (0.5 mLs Intramuscular Given 7/16/22 2302)     Medical  Decision Making:   History:   Old Medical Records: I decided to obtain old medical records.  Initial Assessment:   Rafa Crowley is a 52 y.o. male who presents to the ED for evaluation of foreign body sensation to right eye with redness and tearing onset this afternoon from cutting his grass. Patient attempted to use eye solution to wash his eye out but endorses no relief. Patient denies visual disturbance and has no other complaints at the present time.  ED Management:  Right eye exam was negative for foreign body and for corneal abrasion on fluorescein test.  Patient was given instructions for conjunctivitis and received a prescription for TobraDex.  He was advised to follow-up with ophthalmology within the next week for re-evaluation/return to the emergency department if condition worsens.          Scribe Attestation:   Scribe #1: I performed the above scribed service and the documentation accurately describes the services I performed. I attest to the accuracy of the note.               This document was produced by a scribe under my direction and in my presence. I agree with the content of the note and have made any necessary edits.     Dr. Sandy    07/17/2022 5:35 AM    Clinical Impression:   Final diagnoses:  [H10.9] Conjunctivitis of right eye, unspecified conjunctivitis type (Primary)          ED Disposition Condition    Discharge Stable        ED Prescriptions     Medication Sig Dispense Start Date End Date Auth. Provider    tobramycin-dexamethasone 0.3-0.1% (TOBRADEX) 0.3-0.1 % DrpS Place 2 drops into the right eye every 4 (four) hours while awake. for 5 days 5 mL 7/16/2022 7/21/2022 Kamron Sandy MD        Follow-up Information     Follow up With Specialties Details Why Contact Info    Katheryn Espinosa MD Internal Medicine, Wound Care Schedule an appointment as soon as possible for a visit in 3 days For reevaluation [address] [place] [address]  [address]  [place] [address] [phone]      Neal PATHAK  MD [facility] Schedule an appointment as soon as possible for a visit in 2 days For reevaluation [address] [facility] [id number] [phone]             Kamron Sandy MD  07/17/22 0588

## 2022-07-17 NOTE — FIRST PROVIDER EVALUATION
"Medical screening exam completed.  I have conducted a focused provider triage encounter, findings are as follows:    Brief history of present illness:  FB sensation to right eye with tearing    Vitals:    07/16/22 2005   BP: 118/76   BP Location: Right arm   Patient Position: Sitting   Pulse: 94   Resp: 19   Temp: 98.8 °F (37.1 °C)   TempSrc: Oral   SpO2: 96%   Weight: 99.8 kg (220 lb)   Height: 5' 7" (1.702 m)       Pertinent physical exam:  Erythematous right conjunctiva    Brief workup plan:  VA, staining, Tetanus    Preliminary workup initiated; this workup will be continued and followed by the physician or advanced practice provider that is assigned to the patient when roomed.  "

## 2022-11-11 ENCOUNTER — PATIENT MESSAGE (OUTPATIENT)
Dept: SURGERY | Facility: CLINIC | Age: 52
End: 2022-11-11
Payer: COMMERCIAL

## 2022-11-28 ENCOUNTER — TELEPHONE (OUTPATIENT)
Dept: SURGERY | Facility: CLINIC | Age: 52
End: 2022-11-28
Payer: COMMERCIAL

## 2022-11-29 ENCOUNTER — OFFICE VISIT (OUTPATIENT)
Dept: SURGERY | Facility: CLINIC | Age: 52
End: 2022-11-29
Payer: COMMERCIAL

## 2022-11-29 VITALS
HEIGHT: 67 IN | WEIGHT: 222.25 LBS | BODY MASS INDEX: 34.88 KG/M2 | HEART RATE: 76 BPM | DIASTOLIC BLOOD PRESSURE: 76 MMHG | SYSTOLIC BLOOD PRESSURE: 141 MMHG

## 2022-11-29 DIAGNOSIS — L98.9 DISORDER OF PERIANAL SKIN: Primary | ICD-10-CM

## 2022-11-29 PROCEDURE — 99212 OFFICE O/P EST SF 10 MIN: CPT | Mod: S$GLB,,, | Performed by: STUDENT IN AN ORGANIZED HEALTH CARE EDUCATION/TRAINING PROGRAM

## 2022-11-29 PROCEDURE — 99212 PR OFFICE/OUTPT VISIT, EST, LEVL II, 10-19 MIN: ICD-10-PCS | Mod: S$GLB,,, | Performed by: STUDENT IN AN ORGANIZED HEALTH CARE EDUCATION/TRAINING PROGRAM

## 2022-11-29 PROCEDURE — 1159F PR MEDICATION LIST DOCUMENTED IN MEDICAL RECORD: ICD-10-PCS | Mod: CPTII,S$GLB,, | Performed by: STUDENT IN AN ORGANIZED HEALTH CARE EDUCATION/TRAINING PROGRAM

## 2022-11-29 PROCEDURE — 3008F PR BODY MASS INDEX (BMI) DOCUMENTED: ICD-10-PCS | Mod: CPTII,S$GLB,, | Performed by: STUDENT IN AN ORGANIZED HEALTH CARE EDUCATION/TRAINING PROGRAM

## 2022-11-29 PROCEDURE — 3078F DIAST BP <80 MM HG: CPT | Mod: CPTII,S$GLB,, | Performed by: STUDENT IN AN ORGANIZED HEALTH CARE EDUCATION/TRAINING PROGRAM

## 2022-11-29 PROCEDURE — 3008F BODY MASS INDEX DOCD: CPT | Mod: CPTII,S$GLB,, | Performed by: STUDENT IN AN ORGANIZED HEALTH CARE EDUCATION/TRAINING PROGRAM

## 2022-11-29 PROCEDURE — 3077F PR MOST RECENT SYSTOLIC BLOOD PRESSURE >= 140 MM HG: ICD-10-PCS | Mod: CPTII,S$GLB,, | Performed by: STUDENT IN AN ORGANIZED HEALTH CARE EDUCATION/TRAINING PROGRAM

## 2022-11-29 PROCEDURE — 99999 PR PBB SHADOW E&M-EST. PATIENT-LVL III: ICD-10-PCS | Mod: PBBFAC,,, | Performed by: STUDENT IN AN ORGANIZED HEALTH CARE EDUCATION/TRAINING PROGRAM

## 2022-11-29 PROCEDURE — 3078F PR MOST RECENT DIASTOLIC BLOOD PRESSURE < 80 MM HG: ICD-10-PCS | Mod: CPTII,S$GLB,, | Performed by: STUDENT IN AN ORGANIZED HEALTH CARE EDUCATION/TRAINING PROGRAM

## 2022-11-29 PROCEDURE — 3077F SYST BP >= 140 MM HG: CPT | Mod: CPTII,S$GLB,, | Performed by: STUDENT IN AN ORGANIZED HEALTH CARE EDUCATION/TRAINING PROGRAM

## 2022-11-29 PROCEDURE — 1159F MED LIST DOCD IN RCRD: CPT | Mod: CPTII,S$GLB,, | Performed by: STUDENT IN AN ORGANIZED HEALTH CARE EDUCATION/TRAINING PROGRAM

## 2022-11-29 PROCEDURE — 99999 PR PBB SHADOW E&M-EST. PATIENT-LVL III: CPT | Mod: PBBFAC,,, | Performed by: STUDENT IN AN ORGANIZED HEALTH CARE EDUCATION/TRAINING PROGRAM

## 2022-11-29 NOTE — PROGRESS NOTES
[facility]  Colon and Rectal Surgery    [address]  [place], [place]  Tel: [phone]  Fax: [phone]  [email or web address]   MD Ernie Urban MD Brian Kann, MD W. Forrest Johnston, MD Matthew Giglia, MD Jennifer Paruch, MD William Kethman, MD     Patient name: Rafa Crowley   YOB: 1970   MRN: [id number]  Date of visit: 11/29/2022    Dear Dr. Espinosa,    It was a pleasure seeing Mr. Crowley in the Colon and Rectal Surgery clinic here at [facility].     As you know, Mr. Crowley is a 51 year old man with no significant medical history who presents for evaluation of recurrent SBO . He was seen by Dr. Baltazar recently for recurrent SBO - she ordered a colonoscopy and MRE for evaluation. He underwent a laparoscopic, converted to open, lysis of adhesions in 2017 by Dr. Arreguin and was recently admitted for non-operative management of a pSBO. He is doing well - prior to his surgery in 2017 he had never had a bowel obstruction. He denies any pain, nausea or vomiting currently.    2017 - Exploratory laparotomy, lysis of adhesions - laparoscopic, converted to open, minimal adhesions described but they did lyse the band, he did not have any other evidence of pathology (operative note reviewed personally and with the patient)    12/17/2021 - CT AP - Images reviewed personally and with the patient, agree with location of transition  1. Mildly distended fluid-filled loops of proximal and mid small bowel suspicious for least partial small bowel obstruction with possible transition point in the right lower quadrant. No evidence of free intraperitoneal air or pneumatosis.    12/18/2021 - Small bowel follow through  No evidence of complete obstruction - antegrade contrast appreciated    2/22/2022  Since our last visit, colonoscopy was scheduled on 3/1/2022. MRE confirmed no evidence of mass or stricture. Remaining relatively asymptomatic.    MRE  Enterography - 2/21/2022  No findings to explain small-bowel obstruction.  No evidence of stricture or mass.  No imaging findings of small bowel inflammation.    4/13/2022  He has not had any obstructive symptoms since our last visit. He does note that the skin tags do cause pruritis and some difficulty with cleanliness.     Colonoscopy - 4/13/2022 - Follow-up in 10 years for screening  - Perianal skin tags found on perianal exam.   - The examined portion of the ileum was normal.   - The entire examined colon is normal on direct and retroflexion views.       The patient was informed of the availability of a certified  without charge. A certified  was not necessary for this visit.    Review of Systems  See pertinent review of systems above    Past Medical History:   Diagnosis Date    Bowel obstruction     Partial small bowel obstruction      Past Surgical History:   Procedure Laterality Date    ABDOMINAL SURGERY      COLONOSCOPY N/A 4/13/2022    Procedure: COLONOSCOPY;  Surgeon: Pietro Madsen MD;  Location: [address]);  Service: Endoscopy;  Laterality: N/A;  fully vaccinated    knee scope      KNEE SURGERY       Family History   Problem Relation Age of Onset    Cancer Mother         kidney    Cancer Father         bladder    Cancer Maternal Aunt         kidney    Inflammatory bowel disease Neg Hx     Colon cancer Neg Hx      Social History     Tobacco Use    Smoking status: Never    Smokeless tobacco: Former     Quit date: 3/13/2017   Substance Use Topics    Alcohol use: Yes     Comment: socially    Drug use: No     Review of patient's allergies indicates:   Allergen Reactions    Penicillins Hives       No current outpatient medications on file prior to visit.     No current facility-administered medications on file prior to visit.     Physical Examination  BP (!) 141/76 (BP Location: Left arm, Patient Position: Sitting, BP Method: Large (Automatic))   Pulse 76   Ht 5'  "7" (1.702 m)   Wt 100.8 kg (222 lb 3.6 oz)   BMI 34.81 kg/m²      A chaperone was present for the physical examination.    Constitutional: well developed, no cough, no dyspnea, alert, and no acute distress    Head: Normocephalic, no lesions, without obvious abnormality  Eye: Normal external eye, conjunctiva, and lids  Cardiovascular: regular rate and regular rhythm  Respiratory: normal air entry  Gastrointestinal: soft, non-tender, no midline hernia, scar well-healed  Musculoskeletal: full range of motion without pain  Neurologic: alert, oriented, normal speech, no focal findings or movement disorder noted  Psychiatric: appropriate, normal mood    Perianal exam  Pictures reviewed with patient from colonoscopy    Assessment and Plan of Care    Thank you again for referring Mr. Crowley to my care. In summary, Mr. Crowley is a 51 year old man presenting with a recurrent pSBO. We discussed treatment options and have provided the following recommendations:    In regards to his small bowel obstructions, we previously discussed benefits and risks of pursuing repeat surgical intervention and likelihood for recurrent obstructive symptoms and development of scar tissue - would recommend against intervention at this time, if frequency of symptoms increased then can consider (at risk of development of future adhesions).  Discussed that excision of his skin tags does not necessary need to be performed - we reviewed expectations and that we may not be able to remove all of them during a single surgery due to circumferential nature. Discussed perioperative pain management as well. He will think about it and get back with us if he would like to proceed.    Please do not hesitate to contact me if you have any questions.      Pietro Madsen MD - Staff Surgeon  Department of Colon & Rectal Surgery  [facility]      "

## 2023-12-26 ENCOUNTER — OFFICE VISIT (OUTPATIENT)
Dept: SLEEP MEDICINE | Facility: CLINIC | Age: 53
End: 2023-12-26
Payer: COMMERCIAL

## 2023-12-26 VITALS
OXYGEN SATURATION: 97 % | DIASTOLIC BLOOD PRESSURE: 76 MMHG | HEIGHT: 67 IN | BODY MASS INDEX: 36.47 KG/M2 | WEIGHT: 232.38 LBS | SYSTOLIC BLOOD PRESSURE: 134 MMHG | HEART RATE: 74 BPM

## 2023-12-26 DIAGNOSIS — K11.7 XEROSTOMIA: ICD-10-CM

## 2023-12-26 DIAGNOSIS — G47.33 OSA (OBSTRUCTIVE SLEEP APNEA): Primary | ICD-10-CM

## 2023-12-26 PROCEDURE — 3075F PR MOST RECENT SYSTOLIC BLOOD PRESS GE 130-139MM HG: ICD-10-PCS | Mod: CPTII,S$GLB,, | Performed by: INTERNAL MEDICINE

## 2023-12-26 PROCEDURE — 3075F SYST BP GE 130 - 139MM HG: CPT | Mod: CPTII,S$GLB,, | Performed by: INTERNAL MEDICINE

## 2023-12-26 PROCEDURE — 99204 PR OFFICE/OUTPT VISIT, NEW, LEVL IV, 45-59 MIN: ICD-10-PCS | Mod: S$GLB,,, | Performed by: INTERNAL MEDICINE

## 2023-12-26 PROCEDURE — 99999 PR PBB SHADOW E&M-EST. PATIENT-LVL III: ICD-10-PCS | Mod: PBBFAC,,, | Performed by: INTERNAL MEDICINE

## 2023-12-26 PROCEDURE — 99999 PR PBB SHADOW E&M-EST. PATIENT-LVL III: CPT | Mod: PBBFAC,,, | Performed by: INTERNAL MEDICINE

## 2023-12-26 PROCEDURE — 1159F PR MEDICATION LIST DOCUMENTED IN MEDICAL RECORD: ICD-10-PCS | Mod: CPTII,S$GLB,, | Performed by: INTERNAL MEDICINE

## 2023-12-26 PROCEDURE — 1159F MED LIST DOCD IN RCRD: CPT | Mod: CPTII,S$GLB,, | Performed by: INTERNAL MEDICINE

## 2023-12-26 PROCEDURE — 1160F RVW MEDS BY RX/DR IN RCRD: CPT | Mod: CPTII,S$GLB,, | Performed by: INTERNAL MEDICINE

## 2023-12-26 PROCEDURE — 3008F PR BODY MASS INDEX (BMI) DOCUMENTED: ICD-10-PCS | Mod: CPTII,S$GLB,, | Performed by: INTERNAL MEDICINE

## 2023-12-26 PROCEDURE — 3008F BODY MASS INDEX DOCD: CPT | Mod: CPTII,S$GLB,, | Performed by: INTERNAL MEDICINE

## 2023-12-26 PROCEDURE — 3078F DIAST BP <80 MM HG: CPT | Mod: CPTII,S$GLB,, | Performed by: INTERNAL MEDICINE

## 2023-12-26 PROCEDURE — 3078F PR MOST RECENT DIASTOLIC BLOOD PRESSURE < 80 MM HG: ICD-10-PCS | Mod: CPTII,S$GLB,, | Performed by: INTERNAL MEDICINE

## 2023-12-26 PROCEDURE — 99204 OFFICE O/P NEW MOD 45 MIN: CPT | Mod: S$GLB,,, | Performed by: INTERNAL MEDICINE

## 2023-12-26 PROCEDURE — 1160F PR REVIEW ALL MEDS BY PRESCRIBER/CLIN PHARMACIST DOCUMENTED: ICD-10-PCS | Mod: CPTII,S$GLB,, | Performed by: INTERNAL MEDICINE

## 2023-12-26 RX ORDER — FLUTICASONE PROPIONATE 50 MCG
1 SPRAY, SUSPENSION (ML) NASAL
COMMUNITY
Start: 2023-09-25

## 2023-12-26 NOTE — PROGRESS NOTES
Subjective:   Patient ID: Rafa Crowley is a 53 y.o. male    Chief Complaint:   Chief Complaint   Patient presents with    Apnea       HPI  Rafa Crowley was diagnosed with Obstructive Sleep Apnea in the past. He was previously seen by Dr. Bronson, last in 2018.Initial triggers for sleep study include snoring, daytime hypersomnia.    He has CDL.    His last visit with Dr. Bronson in 2018 - he ad increased in APAP from 6-15 to 10-18.  Today, the patient presents for follow up visit and to get prescription for supplies.  He is compliant with CPAP and is benefiting from therapy. He does complain that he has dry mouth in the mornings despite increasing the humidification to max at 5.    Device checked today - it is Dreamstation 2 and set for 8.5 to 18 cmH2O with Amaraview mask.    There is record available of prior study:    Split study 3/2/18  ahi of 35.3; optimal cpap was not available.  Bmi 34.9  Weight 223 lbs.    PAP therapy:  The patient has since been issued a CPAP.    Patient Durable Medical Equipment (DME) company is [facility] phone number- [phone]  Device:   DreamStation 2 Advanced  [id number] - issued 12/20/2023 12/11/2023 - 12/18/2023  Dreamstation II  Setting: Auto CPAP 8.5-18    The interface is the Amaraview and it is  comfortable.     According to the patient, the compliance with PAP is 6-7 hours per night, 7 nights per week.      Currently, with PAP  use:  Rafa Crowley does feel refreshed upon awakening.   An assessment of daytime sleep tendency reveals that he does not have episodes of inadvertent dozing even when inactive or being sedentary.   Additionally, he does not have any difficulty maintaining alertness while driving. He has not had any motor vehicle accidents related to any inadvertent dozing while driving.         PAP objective data downloaded from PAP device shows (CPT 93819):  Compliance:  Days used:   89/90  Days used > 4hrs:  89/90  Average use  (hr:min):  6:6  Pressure:   16.9  Residual AHI:   4.9          Objective:   Vitals reviewed   Physical Exam  Constitutional:       Appearance: Normal appearance.   Neurological:      General: No focal deficit present.      Mental Status: He is alert. Mental status is at baseline.   Psychiatric:         Mood and Affect: Mood normal.         Behavior: Behavior normal.         Thought Content: Thought content normal.         Judgment: Judgment normal.         Assessment:     Problem List Items Addressed This Visit          Other    BELIA (obstructive sleep apnea) - Primary         Plan:         Patient is on PAP therapy at this time  excellent Compliance  excellent Efficacy    - Teaching in regards to PAP use and mask adjustment was given to the patient during the visit today.  - Use PAP >4hours per night for 7 nights per week  - Use PAP for any daytime sleeping  - Interface patient chose that was prescribed today: Zaria mccall  - Engage in a weight loss program through diet and exercise plan  - Prescription for PAP device supplies will be send to the [employer] today. Filter, mask, tube with climate line and humidification system.      Patient suffers from a complex medical condition and if sleep disordered breathing is not properly treated it will increase his morbidity and mortality and patient is aware that has to be optimally compliant with therapy. Sleep disordered breathing has been associated with uncontrolled hypertension, insulin resistance, pulmonary hypertension, dementia, glaucoma, cardiac arrhythmias, cerebro vascular accident and multiple other conditions when not treated appropriately. Patient benefits from PAP therapy.      Xerostomia  - recommend trial of OTC as needed Xylimelts    RTC annually

## 2024-04-26 ENCOUNTER — LAB VISIT (OUTPATIENT)
Dept: LAB | Facility: HOSPITAL | Age: 54
End: 2024-04-26
Attending: INTERNAL MEDICINE
Payer: COMMERCIAL

## 2024-04-26 ENCOUNTER — OFFICE VISIT (OUTPATIENT)
Dept: FAMILY MEDICINE | Facility: CLINIC | Age: 54
End: 2024-04-26
Payer: COMMERCIAL

## 2024-04-26 VITALS
WEIGHT: 230 LBS | TEMPERATURE: 98 F | OXYGEN SATURATION: 97 % | HEIGHT: 67 IN | BODY MASS INDEX: 36.1 KG/M2 | DIASTOLIC BLOOD PRESSURE: 78 MMHG | SYSTOLIC BLOOD PRESSURE: 130 MMHG | HEART RATE: 65 BPM

## 2024-04-26 DIAGNOSIS — Z11.59 NEED FOR HEPATITIS C SCREENING TEST: ICD-10-CM

## 2024-04-26 DIAGNOSIS — Z11.4 ENCOUNTER FOR SCREENING FOR HIV: ICD-10-CM

## 2024-04-26 DIAGNOSIS — R21 RASH OF GENITAL AREA: ICD-10-CM

## 2024-04-26 DIAGNOSIS — B37.2 CANDIDAL INTERTRIGO: Primary | ICD-10-CM

## 2024-04-26 DIAGNOSIS — G72.0 STATIN MYOPATHY: ICD-10-CM

## 2024-04-26 DIAGNOSIS — Z00.00 HEALTH CARE MAINTENANCE: ICD-10-CM

## 2024-04-26 DIAGNOSIS — T46.6X5A STATIN MYOPATHY: ICD-10-CM

## 2024-04-26 DIAGNOSIS — E66.01 SEVERE OBESITY (BMI 35.0-39.9) WITH COMORBIDITY: ICD-10-CM

## 2024-04-26 DIAGNOSIS — Z23 NEED FOR SHINGLES VACCINE: ICD-10-CM

## 2024-04-26 LAB
ALBUMIN SERPL BCP-MCNC: 4.2 G/DL (ref 3.5–5.2)
ALP SERPL-CCNC: 83 U/L (ref 55–135)
ALT SERPL W/O P-5'-P-CCNC: 34 U/L (ref 10–44)
ANION GAP SERPL CALC-SCNC: 11 MMOL/L (ref 8–16)
AST SERPL-CCNC: 21 U/L (ref 10–40)
BASOPHILS # BLD AUTO: 0.04 K/UL (ref 0–0.2)
BASOPHILS NFR BLD: 0.8 % (ref 0–1.9)
BILIRUB SERPL-MCNC: 0.5 MG/DL (ref 0.1–1)
BUN SERPL-MCNC: 16 MG/DL (ref 6–20)
CALCIUM SERPL-MCNC: 9.9 MG/DL (ref 8.7–10.5)
CHLORIDE SERPL-SCNC: 104 MMOL/L (ref 95–110)
CHOLEST SERPL-MCNC: 231 MG/DL (ref 120–199)
CHOLEST/HDLC SERPL: 3.9 {RATIO} (ref 2–5)
CO2 SERPL-SCNC: 24 MMOL/L (ref 23–29)
CREAT SERPL-MCNC: 0.9 MG/DL (ref 0.5–1.4)
DIFFERENTIAL METHOD BLD: NORMAL
EOSINOPHIL # BLD AUTO: 0.1 K/UL (ref 0–0.5)
EOSINOPHIL NFR BLD: 1.4 % (ref 0–8)
ERYTHROCYTE [DISTWIDTH] IN BLOOD BY AUTOMATED COUNT: 13.3 % (ref 11.5–14.5)
EST. GFR  (NO RACE VARIABLE): >60 ML/MIN/1.73 M^2
ESTIMATED AVG GLUCOSE: 120 MG/DL (ref 68–131)
GLUCOSE SERPL-MCNC: 99 MG/DL (ref 70–110)
HBA1C MFR BLD: 5.8 % (ref 4–5.6)
HCT VFR BLD AUTO: 45.7 % (ref 40–54)
HCV AB SERPL QL IA: NORMAL
HDLC SERPL-MCNC: 60 MG/DL (ref 40–75)
HDLC SERPL: 26 % (ref 20–50)
HGB BLD-MCNC: 15 G/DL (ref 14–18)
HIV 1+2 AB+HIV1 P24 AG SERPL QL IA: NORMAL
IMM GRANULOCYTES # BLD AUTO: 0.02 K/UL (ref 0–0.04)
IMM GRANULOCYTES NFR BLD AUTO: 0.4 % (ref 0–0.5)
LDLC SERPL CALC-MCNC: 149.6 MG/DL (ref 63–159)
LYMPHOCYTES # BLD AUTO: 1.4 K/UL (ref 1–4.8)
LYMPHOCYTES NFR BLD: 27.3 % (ref 18–48)
MCH RBC QN AUTO: 29.7 PG (ref 27–31)
MCHC RBC AUTO-ENTMCNC: 32.8 G/DL (ref 32–36)
MCV RBC AUTO: 91 FL (ref 82–98)
MONOCYTES # BLD AUTO: 0.5 K/UL (ref 0.3–1)
MONOCYTES NFR BLD: 9.7 % (ref 4–15)
NEUTROPHILS # BLD AUTO: 3.1 K/UL (ref 1.8–7.7)
NEUTROPHILS NFR BLD: 60.4 % (ref 38–73)
NONHDLC SERPL-MCNC: 171 MG/DL
NRBC BLD-RTO: 0 /100 WBC
PLATELET # BLD AUTO: 256 K/UL (ref 150–450)
PMV BLD AUTO: 10.7 FL (ref 9.2–12.9)
POTASSIUM SERPL-SCNC: 3.9 MMOL/L (ref 3.5–5.1)
PROT SERPL-MCNC: 7.2 G/DL (ref 6–8.4)
RBC # BLD AUTO: 5.05 M/UL (ref 4.6–6.2)
SODIUM SERPL-SCNC: 139 MMOL/L (ref 136–145)
TRIGL SERPL-MCNC: 107 MG/DL (ref 30–150)
TSH SERPL DL<=0.005 MIU/L-ACNC: 1.35 UIU/ML (ref 0.4–4)
WBC # BLD AUTO: 5.05 K/UL (ref 3.9–12.7)

## 2024-04-26 PROCEDURE — 1159F MED LIST DOCD IN RCRD: CPT | Mod: CPTII,S$GLB,, | Performed by: INTERNAL MEDICINE

## 2024-04-26 PROCEDURE — 90750 HZV VACC RECOMBINANT IM: CPT | Mod: S$GLB,,, | Performed by: INTERNAL MEDICINE

## 2024-04-26 PROCEDURE — 85025 COMPLETE CBC W/AUTO DIFF WBC: CPT | Performed by: INTERNAL MEDICINE

## 2024-04-26 PROCEDURE — 86803 HEPATITIS C AB TEST: CPT | Performed by: INTERNAL MEDICINE

## 2024-04-26 PROCEDURE — 87529 HSV DNA AMP PROBE: CPT | Mod: 59 | Performed by: INTERNAL MEDICINE

## 2024-04-26 PROCEDURE — 90471 IMMUNIZATION ADMIN: CPT | Mod: S$GLB,,, | Performed by: INTERNAL MEDICINE

## 2024-04-26 PROCEDURE — 36415 COLL VENOUS BLD VENIPUNCTURE: CPT | Mod: PO | Performed by: INTERNAL MEDICINE

## 2024-04-26 PROCEDURE — 83036 HEMOGLOBIN GLYCOSYLATED A1C: CPT | Performed by: INTERNAL MEDICINE

## 2024-04-26 PROCEDURE — 87389 HIV-1 AG W/HIV-1&-2 AB AG IA: CPT | Performed by: INTERNAL MEDICINE

## 2024-04-26 PROCEDURE — 80061 LIPID PANEL: CPT | Performed by: INTERNAL MEDICINE

## 2024-04-26 PROCEDURE — 99204 OFFICE O/P NEW MOD 45 MIN: CPT | Mod: 25,S$GLB,, | Performed by: INTERNAL MEDICINE

## 2024-04-26 PROCEDURE — 99999 PR PBB SHADOW E&M-EST. PATIENT-LVL IV: CPT | Mod: PBBFAC,,, | Performed by: INTERNAL MEDICINE

## 2024-04-26 PROCEDURE — 84443 ASSAY THYROID STIM HORMONE: CPT | Performed by: INTERNAL MEDICINE

## 2024-04-26 PROCEDURE — 80053 COMPREHEN METABOLIC PANEL: CPT | Performed by: INTERNAL MEDICINE

## 2024-04-26 PROCEDURE — 86696 HERPES SIMPLEX TYPE 2 TEST: CPT | Performed by: INTERNAL MEDICINE

## 2024-04-26 PROCEDURE — 3075F SYST BP GE 130 - 139MM HG: CPT | Mod: CPTII,S$GLB,, | Performed by: INTERNAL MEDICINE

## 2024-04-26 PROCEDURE — 3008F BODY MASS INDEX DOCD: CPT | Mod: CPTII,S$GLB,, | Performed by: INTERNAL MEDICINE

## 2024-04-26 PROCEDURE — 3078F DIAST BP <80 MM HG: CPT | Mod: CPTII,S$GLB,, | Performed by: INTERNAL MEDICINE

## 2024-04-26 RX ORDER — NYSTATIN 100000 U/G
CREAM TOPICAL 2 TIMES DAILY
Qty: 30 G | Refills: 3 | Status: SHIPPED | OUTPATIENT
Start: 2024-04-26

## 2024-04-26 NOTE — PROGRESS NOTES
SUBJECTIVE     Chief Complaint   Patient presents with    Rash       HPI  Rafa Crowley is a 54 y.o. male with multiple medical diagnoses as listed in the medical history and problem list that presents for evaluation of a R groin rash x 2 weeks. Pt reports an erythematous, itchy rash for which he has been applying Lotrimin with some relief. Pt has a positive herpes exposure. The rash spares the penis and scrotum.  Pt has also been applying Gold Bond Powder and foot powder to help keep the itch and moisture down as pt reports excessive sweating.     PAST MEDICAL HISTORY:  Past Medical History:   Diagnosis Date    Bowel obstruction     Partial small bowel obstruction        PAST SURGICAL HISTORY:  Past Surgical History:   Procedure Laterality Date    ABDOMINAL SURGERY      COLONOSCOPY N/A 4/13/2022    Procedure: COLONOSCOPY;  Surgeon: Pietro Madsen MD;  Location: [address]);  Service: Endoscopy;  Laterality: N/A;  fully vaccinated    knee scope      KNEE SURGERY         SOCIAL HISTORY:  Social History     Socioeconomic History    Marital status: Single   Tobacco Use    Smoking status: Never    Smokeless tobacco: Former     Quit date: 3/13/2017   Substance and Sexual Activity    Alcohol use: Yes     Comment: socially    Drug use: No    Sexual activity: Yes     Partners: Female     Social Determinants of Health     Stress: No Stress Concern Present (8/6/2019)    [employer] - Occupational Stress Questionnaire     Feeling of Stress : Not at all       FAMILY HISTORY:  Family History   Problem Relation Name Age of Onset    Cancer Mother          kidney    Cancer Father          bladder    Cancer Maternal Aunt          kidney    Inflammatory bowel disease Neg Hx      Colon cancer Neg Hx         ALLERGIES AND MEDICATIONS: updated and reviewed.  Review of patient's allergies indicates:   Allergen Reactions    Penicillins Hives     Current Outpatient Medications   Medication Sig  "Dispense Refill    fluticasone propionate (FLONASE) 50 mcg/actuation nasal spray 1 spray by Each Nostril route as needed.      nystatin (MYCOSTATIN) cream Apply topically 2 (two) times daily. 30 g 3     No current facility-administered medications for this visit.       ROS  Review of Systems   Constitutional:  Negative for chills and fever.   HENT:  Negative for hearing loss and sore throat.    Eyes:  Negative for visual disturbance.   Respiratory:  Negative for cough and shortness of breath.    Cardiovascular:  Negative for chest pain, palpitations and leg swelling.   Gastrointestinal:  Negative for abdominal pain, constipation, diarrhea, nausea and vomiting.   Genitourinary:  Negative for dysuria, frequency and urgency.   Musculoskeletal:  Negative for arthralgias, joint swelling and myalgias.   Skin:  Positive for rash (R groin). Negative for wound.   Neurological:  Negative for headaches.   Psychiatric/Behavioral:  Negative for agitation and confusion. The patient is not nervous/anxious.          OBJECTIVE     Physical Exam  Vitals:    04/26/24 1014   BP: 130/78   Pulse: 65   Temp: 97.8 °F (36.6 °C)    Body mass index is 36.02 kg/m².  Weight: 104.3 kg (230 lb)   Height: 5' 7" (170.2 cm)     Physical Exam  Constitutional:       General: He is not in acute distress.     Appearance: He is well-developed.   HENT:      Head: Normocephalic and atraumatic.      Right Ear: External ear normal.      Left Ear: External ear normal.      Nose: Nose normal.   Eyes:      General: No scleral icterus.        Right eye: No discharge.         Left eye: No discharge.      Conjunctiva/sclera: Conjunctivae normal.   Neck:      Vascular: No JVD.      Trachea: No tracheal deviation.   Cardiovascular:      Rate and Rhythm: Normal rate and regular rhythm.      Heart sounds: Normal heart sounds. No murmur heard.     No friction rub. No gallop.   Pulmonary:      Effort: Pulmonary effort is normal. No respiratory distress.      Breath " sounds: Normal breath sounds. No wheezing.   Abdominal:      General: Bowel sounds are normal. There is no distension.      Palpations: Abdomen is soft. There is no mass.      Tenderness: There is no abdominal tenderness. There is no guarding or rebound.   Musculoskeletal:         General: No tenderness or deformity. Normal range of motion.      Cervical back: Normal range of motion and neck supple.   Skin:     General: Skin is warm and dry.      Findings: Erythema (R groin) present. No rash.   Neurological:      Mental Status: He is alert and oriented to person, place, and time.      Motor: No abnormal muscle tone.      Coordination: Coordination normal.   Psychiatric:         Behavior: Behavior normal.         Thought Content: Thought content normal.         Judgment: Judgment normal.           Health Maintenance         Date Due Completion Date    Hepatitis C Screening Never done ---    Pneumococcal Vaccines (Age 0-64) (1 of 2 - PCV) Never done ---    HIV Screening Never done ---    Hemoglobin A1c (Diabetic Prevention Screening) Never done ---    Influenza Vaccine (1) 09/01/2023 11/2/2019    COVID-19 Vaccine (3 - 2023-24 season) 09/01/2023 4/11/2021    Shingles Vaccine (2 of 2) 06/21/2024 4/26/2024    Lipid Panel 06/15/2027 6/15/2022    Colorectal Cancer Screening 04/13/2032 4/13/2022    TETANUS VACCINE 07/16/2032 7/16/2022              ASSESSMENT     54 y.o. male with     1. Candidal intertrigo    2. Rash of genital area    3. Health care maintenance    4. Need for hepatitis C screening test    5. Encounter for screening for HIV    6. Need for shingles vaccine    7. Severe obesity (BMI 35.0-39.9) with comorbidity    8. Statin myopathy        PLAN:     1. Candidal intertrigo  - Pt to keep area dry and start nystatin as below  - nystatin (MYCOSTATIN) cream; Apply topically 2 (two) times daily.  Dispense: 30 g; Refill: 3    2. Rash of genital area  - r/o HSV  - HSV 1 & 2, IgG; Future  - HERPES SIMPLEX VIRUS (HSV)  TYPE 1 & 2 DNA BY PCR; Future    3. Health care maintenance  - CBC Auto Differential; Future  - Comprehensive Metabolic Panel; Future  - Hemoglobin A1C; Future  - TSH; Future  - Lipid Panel; Future    4. Need for hepatitis C screening test  - Hepatitis C Antibody; Future    5. Encounter for screening for HIV  - HIV 1/2 Ag/Ab (4th Gen); Future    6. Need for shingles vaccine  - varicella-zoster GE-AS01B (PF)(SHINGRIX) 50 mcg/0.5 mL KIT    7. Severe obesity (BMI 35.0-39.9) with comorbidity  - patient aware of importance of eating a prudent diet and exercising    8. Statin myopathy  - check labs as above        RTC in 1-2 weeks as needed for any acute worsening of current condition or failure to improve       Katheryn Espinosa MD  04/26/2024 9:56 AM        No follow-ups on file.

## 2024-04-29 LAB
HSV1 IGG SERPL QL IA: NEGATIVE
HSV2 IGG SERPL QL IA: NEGATIVE

## 2024-04-30 PROBLEM — R73.03 PREDIABETES: Status: ACTIVE | Noted: 2024-04-30

## 2024-04-30 LAB
HSV-1 DNA BY PCR: NEGATIVE
HSV-2 DNA BY PCR: NEGATIVE

## 2024-06-21 ENCOUNTER — CLINICAL SUPPORT (OUTPATIENT)
Dept: FAMILY MEDICINE | Facility: CLINIC | Age: 54
End: 2024-06-21
Payer: COMMERCIAL

## 2024-06-21 DIAGNOSIS — Z23 NEED FOR SHINGLES VACCINE: Primary | ICD-10-CM

## 2024-06-21 PROCEDURE — 99999 PR PBB SHADOW E&M-EST. PATIENT-LVL I: CPT | Mod: PBBFAC,,,

## 2024-06-25 ENCOUNTER — TELEPHONE (OUTPATIENT)
Dept: FAMILY MEDICINE | Facility: CLINIC | Age: 54
End: 2024-06-25
Payer: COMMERCIAL

## 2024-06-25 DIAGNOSIS — B37.2 CANDIDAL INTERTRIGO: Primary | ICD-10-CM

## 2024-06-25 RX ORDER — NYSTATIN AND TRIAMCINOLONE ACETONIDE 100000; 1 [USP'U]/G; MG/G
OINTMENT TOPICAL 2 TIMES DAILY
Qty: 15 G | Refills: 0 | Status: SHIPPED | OUTPATIENT
Start: 2024-06-25

## 2024-06-25 NOTE — TELEPHONE ENCOUNTER
Patient was last seen on 4/26/2024 and prescribed nystatin cream for candidal intertrigo. He reports that he has seen no improvement in rash. He says he was advised to let provider know if it didn't help and they would go another route. Please advise.

## 2024-06-25 NOTE — TELEPHONE ENCOUNTER
I have sent a prescription for Mycolog ointment instead.  If he does not have improvement, a dermatology referral has been placed.  They will call him for scheduling.  If the Mycolog cream is effective, he will not have to keep the dermatology appointment.

## 2024-06-25 NOTE — TELEPHONE ENCOUNTER
----- Message from Chen Viera sent at 6/25/2024  1:03 PM CDT -----  .Type: Patient Call Back    Who called: Self     What is the request in detail: Need to speak with doctor about last weeks treatment. Stated nothing has changed     Can the clinic reply by [facility]? No     Would the patient rather a call back or a response via [facility]? Call Back     Best call back number: .[phone] (home) [phone] (work)      Additional Information:

## 2024-10-31 ENCOUNTER — OFFICE VISIT (OUTPATIENT)
Dept: URGENT CARE | Facility: CLINIC | Age: 54
End: 2024-10-31
Payer: COMMERCIAL

## 2024-10-31 VITALS
HEART RATE: 75 BPM | WEIGHT: 230 LBS | DIASTOLIC BLOOD PRESSURE: 80 MMHG | SYSTOLIC BLOOD PRESSURE: 155 MMHG | OXYGEN SATURATION: 98 % | HEIGHT: 67 IN | RESPIRATION RATE: 20 BRPM | TEMPERATURE: 98 F | BODY MASS INDEX: 36.1 KG/M2

## 2024-10-31 DIAGNOSIS — R09.89 RUNNY NOSE: Primary | ICD-10-CM

## 2024-10-31 DIAGNOSIS — J00 ACUTE NASOPHARYNGITIS: ICD-10-CM

## 2024-10-31 LAB
CTP QC/QA: YES
SARS-COV-2 AG RESP QL IA.RAPID: NEGATIVE

## 2024-10-31 RX ORDER — FLUTICASONE PROPIONATE 50 MCG
1 SPRAY, SUSPENSION (ML) NASAL DAILY
Qty: 18.2 ML | Refills: 0 | Status: SHIPPED | OUTPATIENT
Start: 2024-10-31

## 2024-10-31 RX ORDER — CETIRIZINE HYDROCHLORIDE 10 MG/1
10 TABLET ORAL DAILY
Qty: 30 TABLET | Refills: 0 | Status: SHIPPED | OUTPATIENT
Start: 2024-10-31 | End: 2024-11-30

## 2024-10-31 RX ORDER — IBUPROFEN 800 MG/1
800 TABLET ORAL 3 TIMES DAILY
Qty: 30 TABLET | Refills: 0 | Status: SHIPPED | OUTPATIENT
Start: 2024-10-31

## 2024-10-31 RX ORDER — PROMETHAZINE HYDROCHLORIDE AND DEXTROMETHORPHAN HYDROBROMIDE 6.25; 15 MG/5ML; MG/5ML
5 SYRUP ORAL EVERY 6 HOURS PRN
Qty: 100 ML | Refills: 0 | Status: SHIPPED | OUTPATIENT
Start: 2024-10-31 | End: 2024-11-05

## 2024-10-31 RX ORDER — BENZONATATE 200 MG/1
200 CAPSULE ORAL 3 TIMES DAILY PRN
Qty: 30 CAPSULE | Refills: 0 | Status: SHIPPED | OUTPATIENT
Start: 2024-10-31 | End: 2024-11-10

## 2025-01-01 ENCOUNTER — OFFICE VISIT (OUTPATIENT)
Dept: URGENT CARE | Facility: CLINIC | Age: 55
End: 2025-01-01
Payer: COMMERCIAL

## 2025-01-01 VITALS
DIASTOLIC BLOOD PRESSURE: 75 MMHG | HEART RATE: 77 BPM | OXYGEN SATURATION: 98 % | HEIGHT: 67 IN | SYSTOLIC BLOOD PRESSURE: 131 MMHG | RESPIRATION RATE: 18 BRPM | TEMPERATURE: 98 F | BODY MASS INDEX: 36.1 KG/M2 | WEIGHT: 230 LBS

## 2025-01-01 DIAGNOSIS — J01.90 ACUTE BACTERIAL SINUSITIS: Primary | ICD-10-CM

## 2025-01-01 DIAGNOSIS — B96.89 ACUTE BACTERIAL SINUSITIS: Primary | ICD-10-CM

## 2025-01-01 PROCEDURE — 99213 OFFICE O/P EST LOW 20 MIN: CPT | Mod: S$GLB,,,

## 2025-01-01 PROCEDURE — 71046 X-RAY EXAM CHEST 2 VIEWS: CPT | Mod: S$GLB,,, | Performed by: RADIOLOGY

## 2025-01-01 RX ORDER — AZITHROMYCIN 250 MG/1
TABLET, FILM COATED ORAL
Qty: 6 TABLET | Refills: 0 | Status: SHIPPED | OUTPATIENT
Start: 2025-01-01 | End: 2025-01-06

## 2025-01-01 RX ORDER — IPRATROPIUM BROMIDE 42 UG/1
SPRAY, METERED NASAL
COMMUNITY
Start: 2024-12-29

## 2025-01-01 RX ORDER — BENZONATATE 200 MG/1
200 CAPSULE ORAL
COMMUNITY
Start: 2024-12-29

## 2025-01-01 RX ORDER — PROMETHAZINE HYDROCHLORIDE AND DEXTROMETHORPHAN HYDROBROMIDE 6.25; 15 MG/5ML; MG/5ML
5 SYRUP ORAL EVERY 6 HOURS PRN
Qty: 180 ML | Refills: 0 | Status: SHIPPED | OUTPATIENT
Start: 2025-01-01 | End: 2025-01-11

## 2025-01-01 RX ORDER — ALBUTEROL SULFATE 90 UG/1
INHALANT RESPIRATORY (INHALATION)
COMMUNITY
Start: 2024-12-29

## 2025-01-01 RX ORDER — PREDNISONE 20 MG/1
40 TABLET ORAL DAILY
Qty: 10 TABLET | Refills: 0 | Status: SHIPPED | OUTPATIENT
Start: 2025-01-01 | End: 2025-01-06

## 2025-01-01 NOTE — PROGRESS NOTES
"Subjective:      Patient ID: Rafa Crowley is a 54 y.o. male.    Vitals:  height is 5' 7" (1.702 m) and weight is 104.3 kg (230 lb). His oral temperature is 98.2 °F (36.8 °C). His blood pressure is 131/75 and his pulse is 77. His respiration is 18 and oxygen saturation is 98%.     Chief Complaint: Cough    54-year-old male here for coughing, congestion, postnasal drip, and wheezing x1 week  He was seen at urgent care 2 days ago and was prescribed Tessalon Perles, ipratropium nasal spray, albuterol. Coughing is constant and productive. Symptoms worsening.  No history of asthma or smoking.  Denies fever, chills, nasal congestion, nausea, vomiting, diarrhea, chest pain, SOB.    Cough  This is a recurrent problem. Episode onset: for 5 days. The problem has been unchanged. The problem occurs constantly. The cough is Productive of sputum. Associated symptoms include nasal congestion, postnasal drip and wheezing. Pertinent negatives include no chest pain, chills, ear pain, fever, headaches, myalgias, rash or shortness of breath. He has tried prescription cough suppressant and OTC cough suppressant for the symptoms.       Constitution: Negative for chills and fever.   HENT:  Positive for congestion and postnasal drip. Negative for ear pain.    Neck: Negative for neck pain.   Cardiovascular:  Negative for chest pain.   Respiratory:  Positive for cough and wheezing. Negative for shortness of breath and asthma.    Gastrointestinal:  Negative for abdominal pain, nausea, vomiting and diarrhea.   Musculoskeletal:  Negative for muscle ache.   Skin:  Negative for rash.   Allergic/Immunologic: Negative for asthma.   Neurological:  Negative for dizziness and headaches.      Objective:     Physical Exam   Constitutional: He is oriented to person, place, and time. He appears well-developed.   HENT:   Head: Normocephalic and atraumatic.   Ears:   Right Ear: External ear normal.   Left Ear: External ear normal.   Nose: Nose normal. "   Mouth/Throat: Oropharynx is clear and moist. Mucous membranes are moist. Oropharynx is clear.   Eyes: Conjunctivae, EOM and lids are normal. Pupils are equal, round, and reactive to light.   Neck: Trachea normal and phonation normal. Neck supple.   Cardiovascular: Normal rate, regular rhythm, normal heart sounds and normal pulses.   Pulmonary/Chest: Effort normal. He has wheezes.         Comments: Mild expiratory wheezing in lower lung fields.    Musculoskeletal: Normal range of motion.         General: Normal range of motion.   Neurological: no focal deficit. He is alert and oriented to person, place, and time.   Skin: Skin is warm, dry and intact. Capillary refill takes less than 2 seconds.   Psychiatric: His speech is normal and behavior is normal. Judgment and thought content normal.   Nursing note and vitals reviewed.    XR CHEST PA AND LATERAL    Result Date: 1/1/2025  EXAMINATION: XR CHEST PA AND LATERAL CLINICAL HISTORY: Cough, unspecified TECHNIQUE: PA and lateral views of the chest were performed. COMPARISON: 12/17/2021. FINDINGS: The previous NG tube is no longer visualized.  The trachea is unremarkable.  The cardiomediastinal silhouette is within normal limits.  The hilar structures are unremarkable.  There is no evidence of free air beneath hemidiaphragms.  There are no pleural effusions.  There is no evidence of a pneumothorax.  There is no evidence of pneumomediastinum.  No airspace opacity is present.  The osseous structures are unremarkable.     No acute intrathoracic process. Electronically signed by: Apollo Molina MD Date:    01/01/2025 Time:    15:54       Assessment:     1. Acute bacterial sinusitis        Plan:     Acute bacterial sinusitis  -     XR CHEST PA AND LATERAL; Future; Expected date: 01/01/2025  -     promethazine-dextromethorphan (PROMETHAZINE-DM) 6.25-15 mg/5 mL Syrp; Take 5 mLs by mouth every 6 (six) hours as needed (cough).  Dispense: 180 mL; Refill: 0  -     predniSONE  (DELTASONE) 20 MG tablet; Take 2 tablets (40 mg total) by mouth once daily. for 5 days  Dispense: 10 tablet; Refill: 0  -     azithromycin (Z-MARSHA) 250 MG tablet; Take 2 tablets by mouth on day 1; Take 1 tablet by mouth on days 2-5  Dispense: 6 tablet; Refill: 0    Discussed with patient that symptoms are most likely viral. Will send antibiotic to take if symptoms persist beyond 10 days. Discussed supportive care.          Patient Instructions   - Rest.    - Drink plenty of fluids.  - Viral upper respiratory infections typically run their course in 10-14 days.      - You can take over-the-counter claritin, zyrtec, allegra, or xyzal as directed. These are antihistamines that can help with runny nose, nasal congestion, sneezing, and helps to dry up post-nasal drip, which usually causes sore throat and cough.              - If you do NOT have high blood pressure, you may use a decongestant form (D)  of this medication (ie. Claritin- D, zyrtec-D, allegra-D) or if you do not take the D form, you can take sudafed (pseudoephedrine) over the counter, which is a decongestant. Do NOT take two decongestant (D) medications at the same time (such as mucinex-D and claritin-D or plain sudafed and claritin D)    - If you DO have Hypertension, anxiety, or palpitations, it is safe to take Coricidin HBP for relief of sinus symptoms.     - You can use Flonase (fluticasone) nasal spray as directed for sinus congestion and postnasal drip. This is a steroid nasal spray that works locally over time to decrease the inflammation in your nose/sinuses and help with allergic symptoms. This is not an quick- relief spray like afrin, but it works well if used daily.  Discontinue if you develop nose bleed  - use nasal saline prior to Flonase.  - Use Ocean Spray Nasal Saline 1-3 puffs each nostril every 2-3 hours then blow out onto tissue. This is to irrigate the nasal passage way to clear the sinus openings. Use until sinus problem resolved.     -  you can take plain Mucinex (guaifenesin) 1200 mg twice a day to help loosen mucous.      -warm salt water gargles can help with sore throat     - warm tea with honey can help with cough. Honey is a natural cough suppressant.     - Dextromethorphan (DM) is a cough suppressant over the counter (ie. mucinex DM, robitussin, delsym; dayquil/nyquil has DM as well.)        - Follow up with your PCP or specialty clinic as directed in the next 1-2 weeks if not improved or as needed.  You can call [phone] to schedule an appointment with the appropriate provider.       - Go to the ER if you develop new or worsening symptoms.      - You must understand that you have received an Urgent Care treatment only and that you may be released before all of your medical problems are known or treated.   - You, the patient, will arrange for follow up care as instructed.   - If your condition worsens or fails to improve we recommend that you receive another evaluation at the ER immediately or contact your PCP to discuss your concerns or return here.

## 2025-01-01 NOTE — PATIENT INSTRUCTIONS

## 2025-02-17 NOTE — PROGRESS NOTES
[facility]  Colon and Rectal Surgery    [address]  [place], [place]  Tel: [phone]  Fax: [phone]  [email or web address]   MD Ernie Urban MD Brian Kann, MD W. Forrest Johnston, MD Matthew Giglia, MD Jennifer Paruch, MD William Kethman, MD     Patient name: Rafa Crowley   YOB: 1970   MRN: [id number]  Date of visit: 02/18/2025    Dear Dr. Espinosa,    It was a pleasure seeing Mr. Crowley in the Colon and Rectal Surgery clinic here at [facility].     As you know, Mr. Crowley is a 54 year old man with no significant medical history who presents for evaluation of recurrent SBO . He was seen by Dr. Baltazar recently for recurrent SBO - she ordered a colonoscopy and MRE for evaluation. He underwent a laparoscopic, converted to open, lysis of adhesions in 2017 by Dr. Arreguin and was recently admitted for non-operative management of a pSBO. He is doing well - prior to his surgery in 2017 he had never had a bowel obstruction. He denies any pain, nausea or vomiting currently.    2017 - Exploratory laparotomy, lysis of adhesions - laparoscopic, converted to open, minimal adhesions described but they did lyse the band, he did not have any other evidence of pathology (operative note reviewed personally and with the patient)    12/17/2021 - CT AP - Images reviewed personally and with the patient, agree with location of transition  1. Mildly distended fluid-filled loops of proximal and mid small bowel suspicious for least partial small bowel obstruction with possible transition point in the right lower quadrant. No evidence of free intraperitoneal air or pneumatosis.    12/18/2021 - Small bowel follow through  No evidence of complete obstruction - antegrade contrast appreciated    2/22/2022  Since our last visit, colonoscopy was scheduled on 3/1/2022. MRE confirmed no evidence of mass or stricture. Remaining relatively asymptomatic.    MRE  Enterography - 2/21/2022  No findings to explain small-bowel obstruction.  No evidence of stricture or mass.  No imaging findings of small bowel inflammation.    4/13/2022  He has not had any obstructive symptoms since our last visit. He does note that the skin tags do cause pruritis and some difficulty with cleanliness.     Colonoscopy - 4/13/2022 - Follow-up in 10 years for screening  - Perianal skin tags found on perianal exam.   - The examined portion of the ileum was normal.   - The entire examined colon is normal on direct and retroflexion views.       2/18/2025  Here to discuss possible skin tag excision/hemorrhoidectomy. He is having mild discomfort and difficulty cleaning.     The patient was informed of the availability of a certified  without charge. A certified  was not necessary for this visit.    Review of Systems  See pertinent review of systems above    Past Medical History:   Diagnosis Date    Bowel obstruction     Partial small bowel obstruction      Past Surgical History:   Procedure Laterality Date    ABDOMINAL SURGERY      COLONOSCOPY N/A 4/13/2022    Procedure: COLONOSCOPY;  Surgeon: Pietro Madsen MD;  Location: [facility] ([address] [place] [address]);  Service: Endoscopy;  Laterality: N/A;  fully vaccinated    knee scope      KNEE SURGERY       Family History   Problem Relation Name Age of Onset    Cancer Mother          kidney    Cancer Father          bladder    Cancer Maternal Aunt          kidney    Inflammatory bowel disease Neg Hx      Colon cancer Neg Hx       Social History     Tobacco Use    Smoking status: Never    Smokeless tobacco: Former     Quit date: 3/13/2017   Substance Use Topics    Alcohol use: Yes     Comment: socially    Drug use: No     Review of patient's allergies indicates:   Allergen Reactions    Penicillins Hives       Current Outpatient Medications on File Prior to Visit   Medication Sig Dispense Refill    albuterol (PROVENTIL/VENTOLIN HFA) 90  "mcg/actuation inhaler Inhale into the lungs.      benzonatate (TESSALON) 200 MG capsule Take 200 mg by mouth.      fluticasone propionate (FLONASE) 50 mcg/actuation nasal spray 1 spray by Each Nostril route as needed.      fluticasone propionate (FLONASE) 50 mcg/actuation nasal spray 1 spray (50 mcg total) by Each Nostril route once daily. 18.2 mL 0    ibuprofen (ADVIL,MOTRIN) 800 MG tablet Take 1 tablet (800 mg total) by mouth 3 (three) times daily. 30 tablet 0    ipratropium (ATROVENT) 42 mcg (0.06 %) nasal spray by Each Nostril route.      nystatin-triamcinolone (MYCOLOG) ointment Apply topically 2 (two) times daily. 15 g 0    cetirizine (ZYRTEC) 10 MG tablet Take 1 tablet (10 mg total) by mouth once daily. 30 tablet 0     No current facility-administered medications on file prior to visit.     Physical Examination  /74 (BP Location: Left arm, Patient Position: Sitting)   Pulse 67   Resp 16   Ht 5' 7" (1.702 m)   Wt 105 kg (231 lb 7.7 oz)   BMI 36.26 kg/m²      A chaperone was present for the physical examination.    Constitutional: well developed, no cough, no dyspnea, alert, and no acute distress    Head: Normocephalic, no lesions, without obvious abnormality  Eye: Normal external eye, conjunctiva, and lids  Cardiovascular: regular rate and regular rhythm  Respiratory: normal air entry  Gastrointestinal: soft, non-tender, no midline hernia, scar well-healed  Musculoskeletal: full range of motion without pain  Neurologic: alert, oriented, normal speech, no focal findings or movement disorder noted  Psychiatric: appropriate, normal mood    Perianal exam  Right anterior and left lateral Grade IV hemorrhoids - internal exam deferred    Assessment and Plan of Care    Thank you again for referring Mr. Crowley to my care. In summary, Mr. Crowley is a 51 year old man presenting with perianal skin tags. We discussed treatment options and have provided the following recommendations:    Discussed that " excision of his skin tags does not necessary need to be performed - we reviewed expectations and that we may not be able to remove all of them during a single surgery due to circumferential nature - will target right anterior and left lateral columns. Discussed perioperative pain management as well. Consent signed, placed case request for 3/28.    Please do not hesitate to contact me if you have any questions.      Pietro Madsen MD - Staff Surgeon  Department of Colon & Rectal Surgery  [facility]

## 2025-02-18 ENCOUNTER — OFFICE VISIT (OUTPATIENT)
Dept: SURGERY | Facility: CLINIC | Age: 55
End: 2025-02-18
Payer: COMMERCIAL

## 2025-02-18 VITALS
HEIGHT: 67 IN | WEIGHT: 231.5 LBS | BODY MASS INDEX: 36.34 KG/M2 | HEART RATE: 67 BPM | RESPIRATION RATE: 16 BRPM | DIASTOLIC BLOOD PRESSURE: 74 MMHG | SYSTOLIC BLOOD PRESSURE: 137 MMHG

## 2025-02-18 DIAGNOSIS — K64.9 HEMORRHOIDS, UNSPECIFIED HEMORRHOID TYPE: Primary | ICD-10-CM

## 2025-03-25 ENCOUNTER — TELEPHONE (OUTPATIENT)
Dept: SURGERY | Facility: CLINIC | Age: 55
End: 2025-03-25
Payer: COMMERCIAL

## 2025-03-25 NOTE — TELEPHONE ENCOUNTER
RN called pt back.  Pt is scheduled for sx this Friday.  Pt has a URI with post nasal drip.  Taking OTC meds.  RN will check in with him on Thursday to make sure he is getting better and remains afebrile.  MD made aware.  Pt verbalized understanding to all.

## 2025-03-27 ENCOUNTER — TELEPHONE (OUTPATIENT)
Dept: SURGERY | Facility: CLINIC | Age: 55
End: 2025-03-27
Payer: COMMERCIAL

## 2025-03-27 ENCOUNTER — PATIENT MESSAGE (OUTPATIENT)
Dept: PREADMISSION TESTING | Facility: HOSPITAL | Age: 55
End: 2025-03-27
Payer: COMMERCIAL

## 2025-03-27 RX ORDER — DEXTROMETHORPHAN POLISTIREX 30 MG/5ML
60 SUSPENSION ORAL 2 TIMES DAILY
COMMUNITY

## 2025-03-27 NOTE — TELEPHONE ENCOUNTER
RN called pt and spoke with him. Pt still has a URI with post nasal drip. Taking OTC meds. Pt also stated that he is pretty congested.  RN suggested that we be cautious and r/s.  We do not want pt to have any challenges with anesthesia.  RN will get back with pt with more dates for his sx after speaking with Dr. Madsen.  Pt verbalized understanding to all.

## 2025-03-27 NOTE — PRE-PROCEDURE INSTRUCTIONS
PreOp Instructions given:    -- Medication information (what to hold and what to take)   -- NPO guidelines as follows: (or as per your Surgeon)  Stop ALL solid food, gum, candy 8 hours before arrival time.  Stop all CLOUDY liquids: coffee with creamer, cloudy juices, 8 hours prior to arrival time.  The patient should be ENCOURAGED to drink carbohydrate-rich clear liquids (sports drinks, clear juices) until 2 hours prior to arrival time.  Stop clear liquids 2 hours prior to arrival time.  CLEAR liquids include water, black coffee NO creamer, clear oral rehydration drinks, clear sports drinks and clear fruit juices (no orange juice, no pulpy juices, no apple cider).   IF IN DOUBT, drink water instead.   NOTHING TO DRINK 2 hours before to surgery/procedure  time. If you are told to take medication on the morning of surgery, it may be taken with a sip of water.   -- *Arrival place and directions given*.  Time to be given the day before procedure by the Surgeon's Office   -- Bathe with antibacterial soap (dial or Hibiclens as instructed)  -- Don't wear any jewelry or valuables and not metals on skin or hair AM of surgery   -- No makeup or moisturizer to face   -- No perfume/cologne, powder, lotions, aftershave or deodorant    Pt verbalized understanding.     >>>pt started with URI on Sunday.  He currently has a post nasal drip, some chest congestion and mostly AM cough although he does cough occ on and off.  Stated he does not hear any wheezing.  No fever or other s/s.  Msg to surg's office    *If going to , see below:     Directions and Instructions for [facility]   At [facility], we have an outstanding team of physicians, anesthesiologists, CRNAs, Registered Nurses, Surgical Technologists, and other ancillary team members all focused on your surgical and procedural care.   Before Your Procedure:   The physician's office will call you with a specific arrival time and directions a  day or two before your scheduled procedure. You may also receive these instructions through your [facility] portal.   Day of Procedure:   Please be sure to arrive at the arrival time given or you may risk your surgery being delayed or canceled. The arrival time is earlier than your scheduled surgery or procedure time. In the winter months please dress warm and bring blankets for you or your child as the waiting room may be cold. If you have difficulty locating the facility, please give us a call at [phone].   Directions:   The [facility] is located on the 1st floor of the hospital building near the [place] entrance.   Parking:   You will park in the South Parking Garage (note location on map). [facility] opens at 5:00 a.m. and has a drop off area by the entrance.  parking is available starting at 7:00 a.m. Please see below for further  parking instructions.   Directions from the parking garage elevators   Blue [facility] Elevators: From the parking garage, take the blue [facility] elevators (located in the center of the parking garage) to the 1st floor of the garage. You will then take a right once off the elevators then another right to the outside of the parking garage. You will be across from the [facility]. You will walk down the sidewalk, pass the  curve at the [place] entrance and continue to follow the sidewalk. You will pass the radiation oncology entrance on your right. Continue to follow the sidewalk to the [facility] glass door entrance.   Hospital Entrance (Inside Route): If a mostly inside route is preferred: Take the inside elevator bank (located at the far north end of the garage) from the parking garage to the 1st floor. On the 1st floor walk past [employer]. Keep walking down the center of the hallway towards the hospital elevators. Once you reach the red brick rhonda, take a left and go past the hospital elevators.  Take another left and follow the blue and white [facility] signs around the hallway to the end. Go outside of the door. You will see the [facility] entrance to your right.   Drop Off:   There is a drop off area at the doors of the [facility] for your convenience. If utilized for pediatric patients, an adult must accompany the patient into the surgery center while another adult figueredo the vehicle.    (at 7:00 a.m.):   Upon check-in, please let the  know that you are utilizing [employer] parking which is free. The . will then call [employer] for your car to be picked up. Your keys and phone number will be collected and given to [employer] services. You will then be given a ticket. Upon discharge, [employer] will be notified to bring your vehicle back when you are ready.   2/6/2024      If going to 2nd floor surgery center, see below:    Directions to the 2nd [facility]  The hallway to get to the surgery center is on the 2nd fl between the gold elevators in the atrium.  Follow the hallway into the waiting room and check in at desk.

## 2025-04-11 ENCOUNTER — TELEPHONE (OUTPATIENT)
Dept: SURGERY | Facility: CLINIC | Age: 55
End: 2025-04-11
Payer: COMMERCIAL

## 2025-04-11 NOTE — TELEPHONE ENCOUNTER
Sx postponed. Pt aware.  New sx date of 4/14 reviewed with pt.  Pt verbalized understanding to all.

## 2025-04-14 ENCOUNTER — RESEARCH ENCOUNTER (OUTPATIENT)
Dept: RESEARCH | Facility: HOSPITAL | Age: 55
End: 2025-04-14
Payer: COMMERCIAL

## 2025-04-14 ENCOUNTER — HOSPITAL ENCOUNTER (OUTPATIENT)
Facility: HOSPITAL | Age: 55
Discharge: HOME | End: 2025-04-14
Attending: STUDENT IN AN ORGANIZED HEALTH CARE EDUCATION/TRAINING PROGRAM | Admitting: STUDENT IN AN ORGANIZED HEALTH CARE EDUCATION/TRAINING PROGRAM
Payer: COMMERCIAL

## 2025-04-14 ENCOUNTER — ANESTHESIA EVENT (OUTPATIENT)
Dept: SURGERY | Facility: HOSPITAL | Age: 55
End: 2025-04-14
Payer: COMMERCIAL

## 2025-04-14 ENCOUNTER — ANESTHESIA (OUTPATIENT)
Dept: SURGERY | Facility: HOSPITAL | Age: 55
End: 2025-04-14
Payer: COMMERCIAL

## 2025-04-14 VITALS
TEMPERATURE: 98 F | WEIGHT: 230 LBS | OXYGEN SATURATION: 100 % | BODY MASS INDEX: 36.1 KG/M2 | DIASTOLIC BLOOD PRESSURE: 61 MMHG | RESPIRATION RATE: 17 BRPM | SYSTOLIC BLOOD PRESSURE: 136 MMHG | HEART RATE: 52 BPM | HEIGHT: 67 IN

## 2025-04-14 DIAGNOSIS — K64.9 HEMORRHOIDS, UNSPECIFIED HEMORRHOID TYPE: Primary | ICD-10-CM

## 2025-04-14 DIAGNOSIS — K64.9 HEMORRHOIDS: ICD-10-CM

## 2025-04-14 PROCEDURE — 25000003 PHARM REV CODE 250

## 2025-04-14 PROCEDURE — 71000044 HC DOSC ROUTINE RECOVERY FIRST HOUR: Performed by: STUDENT IN AN ORGANIZED HEALTH CARE EDUCATION/TRAINING PROGRAM

## 2025-04-14 PROCEDURE — 63600175 PHARM REV CODE 636 W HCPCS: Performed by: STUDENT IN AN ORGANIZED HEALTH CARE EDUCATION/TRAINING PROGRAM

## 2025-04-14 PROCEDURE — 46260 REMOVE IN/EX HEM GROUPS 2+: CPT | Mod: ,,, | Performed by: STUDENT IN AN ORGANIZED HEALTH CARE EDUCATION/TRAINING PROGRAM

## 2025-04-14 PROCEDURE — 27201423 OPTIME MED/SURG SUP & DEVICES STERILE SUPPLY: Performed by: STUDENT IN AN ORGANIZED HEALTH CARE EDUCATION/TRAINING PROGRAM

## 2025-04-14 PROCEDURE — 37000009 HC ANESTHESIA EA ADD 15 MINS: Performed by: STUDENT IN AN ORGANIZED HEALTH CARE EDUCATION/TRAINING PROGRAM

## 2025-04-14 PROCEDURE — 71000015 HC POSTOP RECOV 1ST HR: Performed by: STUDENT IN AN ORGANIZED HEALTH CARE EDUCATION/TRAINING PROGRAM

## 2025-04-14 PROCEDURE — 25000003 PHARM REV CODE 250: Performed by: NURSE PRACTITIONER

## 2025-04-14 PROCEDURE — 88304 TISSUE EXAM BY PATHOLOGIST: CPT | Mod: TC,91 | Performed by: STUDENT IN AN ORGANIZED HEALTH CARE EDUCATION/TRAINING PROGRAM

## 2025-04-14 PROCEDURE — 36000707: Performed by: STUDENT IN AN ORGANIZED HEALTH CARE EDUCATION/TRAINING PROGRAM

## 2025-04-14 PROCEDURE — 37000008 HC ANESTHESIA 1ST 15 MINUTES: Performed by: STUDENT IN AN ORGANIZED HEALTH CARE EDUCATION/TRAINING PROGRAM

## 2025-04-14 PROCEDURE — 63600175 PHARM REV CODE 636 W HCPCS

## 2025-04-14 PROCEDURE — 25000003 PHARM REV CODE 250: Performed by: STUDENT IN AN ORGANIZED HEALTH CARE EDUCATION/TRAINING PROGRAM

## 2025-04-14 PROCEDURE — 36000706: Performed by: STUDENT IN AN ORGANIZED HEALTH CARE EDUCATION/TRAINING PROGRAM

## 2025-04-14 RX ORDER — GLUCAGON 1 MG
1 KIT INJECTION
OUTPATIENT
Start: 2025-04-14

## 2025-04-14 RX ORDER — PROPOFOL 10 MG/ML
VIAL (ML) INTRAVENOUS
Status: DISCONTINUED | OUTPATIENT
Start: 2025-04-14 | End: 2025-04-14

## 2025-04-14 RX ORDER — DEXAMETHASONE SODIUM PHOSPHATE 4 MG/ML
INJECTION, SOLUTION INTRA-ARTICULAR; INTRALESIONAL; INTRAMUSCULAR; INTRAVENOUS; SOFT TISSUE
Status: DISCONTINUED | OUTPATIENT
Start: 2025-04-14 | End: 2025-04-14

## 2025-04-14 RX ORDER — ONDANSETRON HYDROCHLORIDE 2 MG/ML
4 INJECTION, SOLUTION INTRAVENOUS DAILY PRN
OUTPATIENT
Start: 2025-04-14

## 2025-04-14 RX ORDER — OXYCODONE HYDROCHLORIDE 5 MG/1
5 TABLET ORAL EVERY 4 HOURS PRN
Refills: 0 | Status: DISCONTINUED | OUTPATIENT
Start: 2025-04-14 | End: 2025-04-14 | Stop reason: HOSPADM

## 2025-04-14 RX ORDER — SODIUM CHLORIDE 0.9 % (FLUSH) 0.9 %
10 SYRINGE (ML) INJECTION
Status: DISCONTINUED | OUTPATIENT
Start: 2025-04-14 | End: 2025-04-14 | Stop reason: HOSPADM

## 2025-04-14 RX ORDER — LIDOCAINE HYDROCHLORIDE 20 MG/ML
INJECTION, SOLUTION EPIDURAL; INFILTRATION; INTRACAUDAL; PERINEURAL
Status: DISCONTINUED | OUTPATIENT
Start: 2025-04-14 | End: 2025-04-14

## 2025-04-14 RX ORDER — KETAMINE HCL IN 0.9 % NACL 50 MG/5 ML
SYRINGE (ML) INTRAVENOUS
Status: DISCONTINUED | OUTPATIENT
Start: 2025-04-14 | End: 2025-04-14

## 2025-04-14 RX ORDER — LIDOCAINE HYDROCHLORIDE 10 MG/ML
1 INJECTION, SOLUTION EPIDURAL; INFILTRATION; INTRACAUDAL; PERINEURAL ONCE
Status: DISCONTINUED | OUTPATIENT
Start: 2025-04-14 | End: 2025-04-14 | Stop reason: HOSPADM

## 2025-04-14 RX ORDER — SODIUM CHLORIDE 9 MG/ML
INJECTION, SOLUTION INTRAVENOUS CONTINUOUS
OUTPATIENT
Start: 2025-04-14

## 2025-04-14 RX ORDER — MIDAZOLAM HYDROCHLORIDE 1 MG/ML
INJECTION INTRAMUSCULAR; INTRAVENOUS
Status: DISCONTINUED | OUTPATIENT
Start: 2025-04-14 | End: 2025-04-14

## 2025-04-14 RX ORDER — OXYCODONE HYDROCHLORIDE 10 MG/1
10 TABLET ORAL EVERY 4 HOURS PRN
Refills: 0 | Status: DISCONTINUED | OUTPATIENT
Start: 2025-04-14 | End: 2025-04-14 | Stop reason: HOSPADM

## 2025-04-14 RX ORDER — LORAZEPAM 2 MG/ML
0.25 INJECTION INTRAMUSCULAR ONCE AS NEEDED
OUTPATIENT
Start: 2025-04-14 | End: 2036-09-10

## 2025-04-14 RX ORDER — SODIUM CHLORIDE 9 MG/ML
INJECTION, SOLUTION INTRAVENOUS CONTINUOUS
Status: DISCONTINUED | OUTPATIENT
Start: 2025-04-14 | End: 2025-04-14 | Stop reason: HOSPADM

## 2025-04-14 RX ORDER — LIDOCAINE HYDROCHLORIDE 10 MG/ML
INJECTION, SOLUTION EPIDURAL; INFILTRATION; INTRACAUDAL; PERINEURAL
Status: DISCONTINUED | OUTPATIENT
Start: 2025-04-14 | End: 2025-04-14 | Stop reason: HOSPADM

## 2025-04-14 RX ORDER — OXYCODONE HYDROCHLORIDE 5 MG/1
5 TABLET ORAL EVERY 4 HOURS PRN
Qty: 30 TABLET | Refills: 0 | Status: SHIPPED | OUTPATIENT
Start: 2025-04-14

## 2025-04-14 RX ORDER — ONDANSETRON HYDROCHLORIDE 2 MG/ML
INJECTION, SOLUTION INTRAVENOUS
Status: DISCONTINUED | OUTPATIENT
Start: 2025-04-14 | End: 2025-04-14

## 2025-04-14 RX ORDER — GABAPENTIN 100 MG/1
200 CAPSULE ORAL 3 TIMES DAILY
Qty: 84 CAPSULE | Refills: 0 | Status: SHIPPED | OUTPATIENT
Start: 2025-04-14 | End: 2025-04-28

## 2025-04-14 RX ORDER — MUPIROCIN 20 MG/G
OINTMENT TOPICAL
Status: DISCONTINUED | OUTPATIENT
Start: 2025-04-14 | End: 2025-04-14 | Stop reason: HOSPADM

## 2025-04-14 RX ORDER — ACETAMINOPHEN 500 MG
1000 TABLET ORAL EVERY 8 HOURS PRN
Qty: 90 TABLET | Refills: 0 | Status: SHIPPED | OUTPATIENT
Start: 2025-04-14

## 2025-04-14 RX ORDER — BUPIVACAINE HYDROCHLORIDE 2.5 MG/ML
INJECTION, SOLUTION EPIDURAL; INFILTRATION; INTRACAUDAL; PERINEURAL
Status: DISCONTINUED | OUTPATIENT
Start: 2025-04-14 | End: 2025-04-14 | Stop reason: HOSPADM

## 2025-04-14 RX ORDER — HALOPERIDOL LACTATE 5 MG/ML
0.5 INJECTION, SOLUTION INTRAMUSCULAR EVERY 10 MIN PRN
Status: DISCONTINUED | OUTPATIENT
Start: 2025-04-14 | End: 2025-04-14 | Stop reason: HOSPADM

## 2025-04-14 RX ORDER — GLUCAGON 1 MG
1 KIT INJECTION
Status: DISCONTINUED | OUTPATIENT
Start: 2025-04-14 | End: 2025-04-14 | Stop reason: HOSPADM

## 2025-04-14 RX ORDER — IBUPROFEN 800 MG/1
800 TABLET ORAL 3 TIMES DAILY
Qty: 90 TABLET | Refills: 0 | Status: SHIPPED | OUTPATIENT
Start: 2025-04-14 | End: 2025-05-14

## 2025-04-14 RX ORDER — FENTANYL CITRATE 50 UG/ML
INJECTION, SOLUTION INTRAMUSCULAR; INTRAVENOUS
Status: DISCONTINUED | OUTPATIENT
Start: 2025-04-14 | End: 2025-04-14

## 2025-04-14 RX ADMIN — FENTANYL CITRATE 50 MCG: 50 INJECTION INTRAMUSCULAR; INTRAVENOUS at 02:04

## 2025-04-14 RX ADMIN — PROPOFOL 125 MCG/KG/MIN: 10 INJECTION, EMULSION INTRAVENOUS at 02:04

## 2025-04-14 RX ADMIN — DEXAMETHASONE SODIUM PHOSPHATE 8 MG: 4 INJECTION, SOLUTION INTRAMUSCULAR; INTRAVENOUS at 02:04

## 2025-04-14 RX ADMIN — SODIUM CHLORIDE, SODIUM GLUCONATE, SODIUM ACETATE, POTASSIUM CHLORIDE, MAGNESIUM CHLORIDE, SODIUM PHOSPHATE, DIBASIC, AND POTASSIUM PHOSPHATE: .53; .5; .37; .037; .03; .012; .00082 INJECTION, SOLUTION INTRAVENOUS at 02:04

## 2025-04-14 RX ADMIN — MIDAZOLAM 2 MG: 1 INJECTION INTRAMUSCULAR; INTRAVENOUS at 02:04

## 2025-04-14 RX ADMIN — MUPIROCIN: 20 OINTMENT TOPICAL at 01:04

## 2025-04-14 RX ADMIN — Medication 10 MG: at 02:04

## 2025-04-14 RX ADMIN — PROPOFOL 20 MG: 10 INJECTION, EMULSION INTRAVENOUS at 02:04

## 2025-04-14 RX ADMIN — Medication 20 MG: at 02:04

## 2025-04-14 RX ADMIN — OXYCODONE 5 MG: 5 TABLET ORAL at 03:04

## 2025-04-14 RX ADMIN — ONDANSETRON 4 MG: 2 INJECTION INTRAMUSCULAR; INTRAVENOUS at 02:04

## 2025-04-14 RX ADMIN — LIDOCAINE HYDROCHLORIDE 100 MG: 20 INJECTION, SOLUTION EPIDURAL; INFILTRATION; INTRACAUDAL at 02:04

## 2025-04-14 RX ADMIN — SODIUM CHLORIDE: 9 INJECTION, SOLUTION INTRAVENOUS at 01:04

## 2025-04-14 RX ADMIN — PROPOFOL 30 MG: 10 INJECTION, EMULSION INTRAVENOUS at 02:04

## 2025-04-14 NOTE — DISCHARGE INSTRUCTIONS
Anal Surgery Post Op Instructions:    You had a hemorrhoidectomy performed today.     Wound care:  Expect some drainage over the next few weeks as the wound heals.  Please wear a pad to help with drainage.     You have no activity restrictions.   No dietary restrictions.    Medications:  A narcotic pain medication has been prescribed.  Please start to wean the pain medication over the following few days.  You can take tylenol instead of the pain medication.      Please take miralax 1 capful at night to prevent constipation associated with narcotic pain medications.      Follow up:  Return to clinic in 4 weeks for follow up and wound check.

## 2025-04-14 NOTE — BRIEF OP NOTE
[facility] ([facility])  Brief Operative Note    SUMMARY     Surgery Date: 4/14/2025     Surgeons and Role:     * Pietro Madsen MD - Primary     * Bladimir King MD - Fellow    Assisting Surgeon: None    Pre-op Diagnosis:  Hemorrhoids, unspecified hemorrhoid type [K64.9]    Post-op Diagnosis:  Post-Op Diagnosis Codes:     * Hemorrhoids, unspecified hemorrhoid type [K64.9]    Procedure(s) (LRB):  HEMORRHOIDECTOMY INVOLVING TWO OR MORE ANAL COLUMNS (N/A)    Anesthesia: General/MAC    Implants:  * No implants in log *    Operative Findings: 2 column hemorrhoidectomy    Estimated Blood Loss: * No values recorded between 4/14/2025  2:08 PM and 4/14/2025  2:48 PM *    Estimated Blood Loss has not been documented. EBL = 5cc.         Specimens:   Specimen (24h ago, onward)       Start     Ordered    04/14/25 1441  Specimen to Pathology Gastrointestinal tract  RELEASE UPON ORDERING        References:    Click here for ordering Quick Tip   Question:  Release to patient  Answer:  Immediate    04/14/25 1441                  ID Type Source Tests Collected by Time Destination   1 : Left Lateral Hemorrhoid Tissue Hemorrhoids SPECIMEN TO PATHOLOGY Pietro Madsen MD 4/14/2025 1440    2 : Right Anterior  Hemorrhoid Tissue Hemorrhoids SPECIMEN TO PATHOLOGY Pietro Madsen MD 4/14/2025 1440        [id number]

## 2025-04-14 NOTE — H&P
54 year old man with no significant medical history who presents for evaluation of recurrent SBO . He was seen by Dr. Baltazar recently for recurrent SBO - she ordered a colonoscopy and MRE for evaluation. He underwent a laparoscopic, converted to open, lysis of adhesions in 2017 by Dr. Arreguin and was recently admitted for non-operative management of a pSBO. He is doing well - prior to his surgery in 2017 he had never had a bowel obstruction. He denies any pain, nausea or vomiting currently.     2017 - Exploratory laparotomy, lysis of adhesions - laparoscopic, converted to open, minimal adhesions described but they did lyse the band, he did not have any other evidence of pathology (operative note reviewed personally and with the patient)     12/17/2021 - CT AP - Images reviewed personally and with the patient, agree with location of transition  1. Mildly distended fluid-filled loops of proximal and mid small bowel suspicious for least partial small bowel obstruction with possible transition point in the right lower quadrant. No evidence of free intraperitoneal air or pneumatosis.     12/18/2021 - Small bowel follow through  No evidence of complete obstruction - antegrade contrast appreciated     2/22/2022  Since our last visit, colonoscopy was scheduled on 3/1/2022. MRE confirmed no evidence of mass or stricture. Remaining relatively asymptomatic.     MRE Enterography - 2/21/2022  No findings to explain small-bowel obstruction.  No evidence of stricture or mass.  No imaging findings of small bowel inflammation.     4/13/2022  He has not had any obstructive symptoms since our last visit. He does note that the skin tags do cause pruritis and some difficulty with cleanliness.      Colonoscopy - 4/13/2022 - Follow-up in 10 years for screening  - Perianal skin tags found on perianal exam.   - The examined portion of the ileum was normal.   - The entire examined colon is normal on direct and retroflexion views.         2/18/2025  Here to discuss possible skin tag excision/hemorrhoidectomy. He is having mild discomfort and difficulty cleaning.      The patient was informed of the availability of a certified  without charge. A certified  was not necessary for this visit.     Review of Systems  See pertinent review of systems above          Past Medical History:   Diagnosis Date    Bowel obstruction      Partial small bowel obstruction              Past Surgical History:   Procedure Laterality Date    ABDOMINAL SURGERY        COLONOSCOPY N/A 4/13/2022     Procedure: COLONOSCOPY;  Surgeon: Pietro Madsen MD;  Location: [address]);  Service: Endoscopy;  Laterality: N/A;  fully vaccinated    knee scope        KNEE SURGERY                 Family History   Problem Relation Name Age of Onset    Cancer Mother             kidney    Cancer Father             bladder    Cancer Maternal Aunt             kidney    Inflammatory bowel disease Neg Hx        Colon cancer Neg Hx          Social History   Social History            Tobacco Use    Smoking status: Never    Smokeless tobacco: Former       Quit date: 3/13/2017   Substance Use Topics    Alcohol use: Yes       Comment: socially    Drug use: No              Review of patient's allergies indicates:   Allergen Reactions    Penicillins Hives         Medications Ordered Prior to Encounter          Current Outpatient Medications on File Prior to Visit   Medication Sig Dispense Refill    albuterol (PROVENTIL/VENTOLIN HFA) 90 mcg/actuation inhaler Inhale into the lungs.        benzonatate (TESSALON) 200 MG capsule Take 200 mg by mouth.        fluticasone propionate (FLONASE) 50 mcg/actuation nasal spray 1 spray by Each Nostril route as needed.        fluticasone propionate (FLONASE) 50 mcg/actuation nasal spray 1 spray (50 mcg total) by Each Nostril route once daily. 18.2 mL 0    ibuprofen (ADVIL,MOTRIN) 800 MG tablet Take 1 tablet (800 mg total) by  mouth 3 (three) times daily. 30 tablet 0    ipratropium (ATROVENT) 42 mcg (0.06 %) nasal spray by Each Nostril route.        nystatin-triamcinolone (MYCOLOG) ointment Apply topically 2 (two) times daily. 15 g 0    cetirizine (ZYRTEC) 10 MG tablet Take 1 tablet (10 mg total) by mouth once daily. 30 tablet 0      No current facility-administered medications on file prior to visit.         Physical Examination  There were no vitals filed for this visit.       A chaperone was present for the physical examination.     Constitutional: well developed, no cough, no dyspnea, alert, and no acute distress    Head: Normocephalic, no lesions, without obvious abnormality  Eye: Normal external eye, conjunctiva, and lids  Cardiovascular: regular rate and regular rhythm  Respiratory: normal air entry  Gastrointestinal: soft, non-tender, no midline hernia, scar well-healed  Musculoskeletal: full range of motion without pain  Neurologic: alert, oriented, normal speech, no focal findings or movement disorder noted  Psychiatric: appropriate, normal mood     Perianal exam  Right anterior and left lateral Grade IV hemorrhoids - internal exam deferred     Assessment and Plan of Care     Thank you again for referring Mr. Crowley to my care. In summary, Mr. Crowley is a 51 year old man presenting with perianal skin tags. We discussed treatment options and have provided the following recommendations:     Discussed that excision of his skin tags does not necessary need to be performed - we reviewed expectations and that we may not be able to remove all of them during a single surgery due to circumferential nature - will target right anterior and left lateral columns. To OR today

## 2025-04-14 NOTE — PLAN OF CARE
Patient states they are ready to be discharged. Instructions and prescription given to patient and significant other. Both verbalize understanding. Patient tolerating po liquids with no difficulty. Patient states pain is at a tolerable level for them. Anesthesia consent and surgical consent in chart upon patient's discharge from [facility].

## 2025-04-14 NOTE — ANESTHESIA PREPROCEDURE EVALUATION
[facility]  Anesthesia Pre-Operative Evaluation    Patient Name: Rafa Crowley  YOB: 1970  MRN: [id number]    SUBJECTIVE:     Pre-operative evaluation for Procedure(s) (LRB):  HEMORRHOIDECTOMY INVOLVING TWO OR MORE ANAL COLUMNS (N/A)    04/14/2025    Rafa Crowley is a 55 y.o. male with no significant cardiopulmonary history and history of small bowel obstruction now presenting for the above procedure(s).      2D ECHO:  TTE:  Results for orders placed during the hospital encounter of 06/15/22    Echo    Interpretation Summary  · The left ventricle is normal in size with concentric hypertrophy and normal systolic function.  · The estimated ejection fraction is 60%.  · Normal left ventricular diastolic function.  · Normal right ventricular size with normal right ventricular systolic function.  · Normal central venous pressure (3 mmHg).  · The estimated PA systolic pressure is 19 mmHg.      JAMES:  No results found for this or any previous visit.    LDA: None documented.       Peripheral IV - Single Lumen 04/13/22 1123 20 G Anterior;Right Forearm (Active)   Number of days: 1097       Prev airway: None documented.    Drips: None documented.      Problem List[1]    Review of patient's allergies indicates:   Allergen Reactions    Penicillins Hives       Current Inpatient Medications:      Antibiotics (From admission, onward)      None            VTE Risk Mitigation (From admission, onward)      None            Medications Ordered Prior to Encounter[2]    Past Surgical History:   Procedure Laterality Date    ABDOMINAL SURGERY      COLONOSCOPY N/A 4/13/2022    Procedure: COLONOSCOPY;  Surgeon: Pietro Madsen MD;  Location: [address]);  Service: Endoscopy;  Laterality: N/A;  fully vaccinated    knee scope      KNEE SURGERY         Social History[3]    OBJECTIVE:     Vital Signs Range (Last 24H):         Significant Labs:  Lab Results   Component Value Date    WBC 5.05  04/26/2024    HGB 15.0 04/26/2024    HCT 45.7 04/26/2024     04/26/2024    CHOL 231 (H) 04/26/2024    TRIG 107 04/26/2024    HDL 60 04/26/2024    ALT 34 04/26/2024    AST 21 04/26/2024     04/26/2024    K 3.9 04/26/2024     04/26/2024    CREATININE 0.9 04/26/2024    BUN 16 04/26/2024    CO2 24 04/26/2024    TSH 1.347 04/26/2024    HGBA1C 5.8 (H) 04/26/2024       Diagnostic Studies: No relevant studies.    EKG:   Results for orders placed or performed in visit on 06/01/22   IN OFFICE EKG 12-LEAD (to [place])    Collection Time: 06/01/22 11:20 AM    Narrative    Test Reason : Z82.49,    Vent. Rate : 066 BPM     Atrial Rate : 066 BPM     P-R Int : 158 ms          QRS Dur : 068 ms      QT Int : 390 ms       P-R-T Axes : 053 063 078 degrees     QTc Int : 408 ms    Normal sinus rhythm  Normal ECG  When compared with ECG of 17-DEC-2021 04:24,  No significant change was found  Confirmed by Shaun Lerma MD ([id number]) on 6/7/2022 9:57:45 AM    Referred By: LOIS   SELF           Confirmed By:Shaun Lerma MD         ASSESSMENT/PLAN:       Pre-op Assessment    I have reviewed the Patient Summary Reports.     I have reviewed the Nursing Notes. I have reviewed the NPO Status.   I have reviewed the Medications.     Review of Systems         Anesthesia Plan  Type of Anesthesia, risks & benefits discussed:    Anesthesia Type: Gen ETT  Intra-op Monitoring Plan: Standard ASA Monitors  Post Op Pain Control Plan: multimodal analgesia and IV/PO Opioids PRN  Induction:  IV  Airway Plan: Direct and Video, Post-Induction  ASA Score: 2  Day of Surgery Review of History & Physical: H&P Update referred to the surgeon/provider.    Ready For Surgery From Anesthesia Perspective.     .           [1]   Patient Active Problem List  Diagnosis    Bronchitis    Small bowel obstruction    BELIA (obstructive sleep apnea)    Fatty liver    Diarrhea    Colon cancer screening    Conjunctivitis of right eye    Severe obesity (BMI 35.0-39.9)  with comorbidity    Statin myopathy    Prediabetes   [2]   No current facility-administered medications on file prior to encounter.     Current Outpatient Medications on File Prior to Encounter   Medication Sig Dispense Refill    cetirizine (ZYRTEC) 10 MG tablet Take 1 tablet (10 mg total) by mouth once daily. 30 tablet 0    dextromethorphan (DELSYM) 30 mg/5 mL liquid Take 60 mg by mouth 2 (two) times daily.      dextromethorphan-guaiFENesin  mg (MUCINEX DM)  mg per 12 hr tablet Take 1 tablet by mouth every 12 (twelve) hours.      fluticasone propionate (FLONASE) 50 mcg/actuation nasal spray 1 spray (50 mcg total) by Each Nostril route once daily. 18.2 mL 0    ibuprofen (ADVIL,MOTRIN) 800 MG tablet Take 1 tablet (800 mg total) by mouth 3 (three) times daily. (Patient not taking: Reported on 3/27/2025) 30 tablet 0   [3]   Social History  Socioeconomic History    Marital status: Single   Tobacco Use    Smoking status: Never    Smokeless tobacco: Former     Quit date: 3/13/2017   Substance and Sexual Activity    Alcohol use: Yes     Comment: socially    Drug use: No    Sexual activity: Yes     Partners: Female     Social Drivers of Health     Financial Resource Strain: Low Risk  (2/18/2025)    Overall Financial Resource Strain (CARDIA)     Difficulty of Paying Living Expenses: Not hard at all   Food Insecurity: Patient Declined (2/18/2025)    Hunger Vital Sign     Worried About Running Out of Food in the Last Year: Patient declined     Ran Out of Food in the Last Year: Patient declined   Transportation Needs: Patient Declined (2/18/2025)    PRAPARE - Transportation     Lack of Transportation (Medical): Patient declined     Lack of Transportation (Non-Medical): Patient declined   Physical Activity: Insufficiently Active (2/18/2025)    Exercise Vital Sign     Days of Exercise per Week: 4 days     Minutes of Exercise per Session: 30 min   Stress: No Stress Concern Present (2/18/2025)    [employer] [place] [employer]  [employer] - Occupational Stress Questionnaire     Feeling of Stress : Not at all   Housing Stability: Patient Declined (2/18/2025)    Housing Stability Vital Sign     Unable to Pay for Housing in the Last Year: Patient declined     Homeless in the Last Year: Patient declined

## 2025-04-14 NOTE — OP NOTE
[facility]  Colon and Rectal Surgery    [address]  [place], [place]  Tel: [phone]  Fax: [phone]  [email or web address]   MD Ernie Urban MD Brian Kann, MD W. Forrest Johnston, MD Matthew Zelhart, MD Jennifer Paruch, MD William Kethman, MD Danielle Kay, MD Steven Schuetz, MD     Patient name: Rafa Crowley   YOB: 1970   MRN: [id number]  Date of surgery: 04/14/2025    Operative Report    Pre-operative diagnosis: Symptomatic hemorrhoids  Post-operative diagnosis: Same as above    Procedure:  Pudendal nerve and perianal block  Anal examination under anesthesia  Internal and external 2 column hemorrhoidectomy    Findings:  Right anterior and left lateral dominant disease    Surgeon: Pietro Madsen MD  Assistant:  Bladimir King MD    Indication: Mr. Crowley is a 55 year old man with symptomatic hemorrhoidal disease who is scheduled to undergo a perianal examination under anesthesia and hemorrhoidectomy. The benefits, risks, and alternatives were discussed with the patient, they were given the opportunity to ask questions and they elected to proceed with operative intervention after signing written consent.    Procedure:  After pre-operative assessment and review of informed consent, the patient was taken to the operating room and received monitored anesthesia care. Pre-operative antibiotics were administered if indicated and the patient was placed in lithotomy position. The perineum was prepped and draped in the usual sterile fashion and a timeout was performed according to [facility] Quality and Safety guidelines.      A pudendal nerve block was performed with 0.25% Marcaine:1% Lidocaine with epinephrine (1:1) by palpating the ischial spine and injecting 5 mL of local approximately 1 cm anterior and medially. A perianal block was then performed with 5 mL of local in 4 quadrant surrounding the anus.    A digital exam was performed  initially and then a thorough four quadrant systematic, anoscopic exam was performed with a Hill-Flower retractor, findings discussed above.    The hemorrhoidal column in the left lateral position was grasped and planned excision marked with electrocautery. The column was then elevated from the internal sphincter to the vascular pedicle (there were two separate but contiguous columns taken separately). The pedicle was divided with Bipolar energy. This was then similarly performed to the right anterior column. At the completion of the case, the anus was able to accommodate a medium Hill-Flower retractor without difficulty and hemostasis was confirmed on delayed exam.    Instrument, needle, and sponge counts were correct.    The procedure was completed without complication and was well-tolerated. The patient was then brought to the post-anesthesia care unit in stable condition. I was present for the entire operation and discussed the surgery with the patients family at the end of the case.    Complications: None  Estimated blood loss: Minimal  Disposition: PACU      Pietro Madsen MD, FACS, FASCRS  Department of Colon & Rectal Surgery  [facility]

## 2025-04-14 NOTE — ANESTHESIA POSTPROCEDURE EVALUATION
Anesthesia Post Evaluation    Patient: Rafa Crowley    Procedure(s) Performed: Procedure(s) (LRB):  HEMORRHOIDECTOMY INVOLVING TWO OR MORE ANAL COLUMNS (N/A)    Final Anesthesia Type: general      Patient location during evaluation: PACU  Patient participation: Yes- Able to Participate  Level of consciousness: awake and alert  Post-procedure vital signs: reviewed and stable  Pain management: adequate  Airway patency: patent    PONV status at discharge: No PONV  Anesthetic complications: no      Cardiovascular status: blood pressure returned to baseline  Respiratory status: spontaneous ventilation and room air  Hydration status: euvolemic  Follow-up not needed.              Vitals Value Taken Time   /67 04/14/25 15:32   Temp 36.7 °C (98 °F) 04/14/25 14:45   Pulse 52 04/14/25 15:36   Resp 16 04/14/25 15:36   SpO2 100 % 04/14/25 15:36   Vitals shown include unfiled device data.      No case tracking events are documented in the log.      Pain/Emelyn Score: Pain Rating Prior to Med Admin: 6 (4/14/2025  3:31 PM)  Emelyn Score: 8 (4/14/2025  2:45 PM)

## 2025-04-14 NOTE — PROGRESS NOTES
EFFECTS OF LOCAL ANESTHESIA ON POSTOPERATIVE PAIN CONTROL FOR MULTIPLE COLUMN HEMORRHOIDECTOMY: EXPAREL (LIPOSOMAL BUPIVACAINE) VS. STANDARD INFUSION  IRB #: [id number]   - Milena Damico MD     SURGERY VISIT   052-M_C    Mr. Rafa Crowley has agreed to participate in the Exparel Hemorrhoid Study.  He was seen in pre-op and he did not have any new questions regarding his participation.  He is willing to continue his participation in the study.    He was randomized on 14 APR 2025 at 02:15pm.      Anesthesia type was: MAC  It was an open hemorrhoidectomy of 2 columns.  Electrocautery was used for the dissection.  A ligasure was used.  He did not 30 mg Toradol intraop.  Please see the operative and anesthesia notes for any additional details that are needed.    Initial pain score in PACU was 4.  Pain score at discharge was 2.    He will receive study related communication tomorrow, the following day, and 30 days post-op.  If he has any questions regarding the study or the pain surveys, he can contact the study staff.    Study staff present at this visit was Sr. RICCARDO Johnson.

## 2025-04-14 NOTE — TRANSFER OF CARE
"Anesthesia Transfer of Care Note    Patient: Rafa Crowley    Procedure(s) Performed: Procedure(s) (LRB):  HEMORRHOIDECTOMY INVOLVING TWO OR MORE ANAL COLUMNS (N/A)    Patient location: [facility]    Anesthesia Type: MAC    Transport from OR: Transported from OR on 6-10 L/min O2 by face mask with adequate spontaneous ventilation    Post pain: adequate analgesia    Post assessment: no apparent anesthetic complications    Post vital signs: stable    Level of consciousness: awake, alert and oriented    Nausea/Vomiting: no nausea/vomiting    Complications: none    Transfer of care protocol was followed      Last vitals: Visit Vitals  /69 (BP Location: Right arm, Patient Position: Lying)   Pulse 62   Temp 35.7 °C (96.3 °F) (Skin)   Resp 16   Ht 5' 7" (1.702 m)   Wt 104.3 kg (230 lb)   SpO2 99%   BMI 36.02 kg/m²     "

## 2025-04-14 NOTE — PROGRESS NOTES
EFFECTS OF LOCAL ANESTHESIA ON POSTOPERATIVE PAIN CONTROL FOR MULTIPLE COLUMN HEMORRHOIDECTOMY: EXPAREL (LIPOSOMAL BUPIVACAINE) VS. STANDARD INFUSION  IRB #: [id number]   - Milena Damico MD     INFORMED CONSENT   052-M_C    Mr. Rafa Crowley was identified as a potential participant in the Exparel Hemorrhoid study.      I met with him in pre-op and a full review of the consent document was provided. Opportunity for questions was available throughout the discussion. All questions were answered to his satisfaction.  After the review of the consent document he verbalized an understanding of the study and a willingness to participate. The consent document was then signed by him and myself at 14:04pm.  Family member was present. The consent contains information regarding the release of HIPAA protected information for the purposes of research. A copy of the signed document was provided to him along with the contact information for the study staff. he was encouraged to call our offices if he should have any questions regarding the study or her participation.      Baseline vitals were performed by clinic staff / study staff.  HR:  64 bpm  BP:  128 / 69  Baseline pain score:0    Eligibility was reviewed by Sr.CRC Elizabeth.  Inclusion Criteria:  1. Age 18 or older - yes  2. Planned for elective excisional hemorrhoidectomy involving 2 or more anal columns - yes  Exclusion Criteria:  1. Patients younger than the age of 18 - no  2. ASA 4 or higher - no  3. Use of opioids within 30 days of the planned procedure - no  4. History of substance abuse or psychiatric disorders - no  5. Patients allergic to LB, bupivacaine, or lidocaine - no  6. Patients unable to tolerate oxycodone or dilaudid as an oral pain medication - no  7. Pregnant women - not applicable  8. Emergent cases - no  9. Reoperative hemorrhoidectomy - no    Next study visit will be on the day of surgery.    Study staff present at this  visit was Jordan Rice Sr.CRC.

## 2025-04-16 LAB
ESTROGEN SERPL-MCNC: NORMAL PG/ML
INSULIN SERPL-ACNC: NORMAL U[IU]/ML
LAB AP CLINICAL INFORMATION: NORMAL
LAB AP GROSS DESCRIPTION: NORMAL
LAB AP PERFORMING LOCATION(S): NORMAL
LAB AP REPORT FOOTNOTES: NORMAL

## 2025-04-23 ENCOUNTER — TELEPHONE (OUTPATIENT)
Dept: SURGERY | Facility: CLINIC | Age: 55
End: 2025-04-23
Payer: COMMERCIAL

## 2025-04-23 NOTE — TELEPHONE ENCOUNTER
Pt called triage line.  He is doing well. Normal drainage and discomfort.  Pain is being controlled with Tylenol and Ibuprofen.  He would like to take a drive for 25mins from [place] to [place].  No longer taking Oxycodone.  Hemorrhoidectomy from 4/14, message sent to MD.  PO visit on 5/20.  Pt verbalized understanding to all.

## 2025-04-30 DIAGNOSIS — R73.03 PREDIABETES: ICD-10-CM

## 2025-05-14 ENCOUNTER — PATIENT MESSAGE (OUTPATIENT)
Dept: SURGERY | Facility: CLINIC | Age: 55
End: 2025-05-14
Payer: COMMERCIAL

## 2025-05-19 ENCOUNTER — TELEPHONE (OUTPATIENT)
Dept: SURGERY | Facility: CLINIC | Age: 55
End: 2025-05-19
Payer: COMMERCIAL

## 2025-05-20 ENCOUNTER — OFFICE VISIT (OUTPATIENT)
Dept: SURGERY | Facility: CLINIC | Age: 55
End: 2025-05-20
Payer: COMMERCIAL

## 2025-05-20 VITALS
HEART RATE: 91 BPM | DIASTOLIC BLOOD PRESSURE: 75 MMHG | BODY MASS INDEX: 36.34 KG/M2 | HEIGHT: 67 IN | RESPIRATION RATE: 18 BRPM | WEIGHT: 231.5 LBS | SYSTOLIC BLOOD PRESSURE: 134 MMHG

## 2025-05-20 DIAGNOSIS — K64.9 HEMORRHOIDS, UNSPECIFIED HEMORRHOID TYPE: Primary | ICD-10-CM

## 2025-05-20 PROCEDURE — 1159F MED LIST DOCD IN RCRD: CPT | Mod: CPTII,S$GLB,, | Performed by: STUDENT IN AN ORGANIZED HEALTH CARE EDUCATION/TRAINING PROGRAM

## 2025-05-20 PROCEDURE — 3075F SYST BP GE 130 - 139MM HG: CPT | Mod: CPTII,S$GLB,, | Performed by: STUDENT IN AN ORGANIZED HEALTH CARE EDUCATION/TRAINING PROGRAM

## 2025-05-20 PROCEDURE — 3078F DIAST BP <80 MM HG: CPT | Mod: CPTII,S$GLB,, | Performed by: STUDENT IN AN ORGANIZED HEALTH CARE EDUCATION/TRAINING PROGRAM

## 2025-05-20 PROCEDURE — 99024 POSTOP FOLLOW-UP VISIT: CPT | Mod: S$GLB,,, | Performed by: STUDENT IN AN ORGANIZED HEALTH CARE EDUCATION/TRAINING PROGRAM

## 2025-05-20 PROCEDURE — 3008F BODY MASS INDEX DOCD: CPT | Mod: CPTII,S$GLB,, | Performed by: STUDENT IN AN ORGANIZED HEALTH CARE EDUCATION/TRAINING PROGRAM

## 2025-05-20 PROCEDURE — 99999 PR PBB SHADOW E&M-EST. PATIENT-LVL III: CPT | Mod: PBBFAC,,, | Performed by: STUDENT IN AN ORGANIZED HEALTH CARE EDUCATION/TRAINING PROGRAM

## 2025-05-20 NOTE — PROGRESS NOTES
[facility]  Colon and Rectal Surgery    [address]  [place], [place]  Tel: [phone]  Fax: [phone]  [email or web address]   MD Ernie Urban MD Brian Kann, MD W. Forrest Johnston, MD Matthew Giglia, MD Jennifer Paruch, MD William Kethman, MD     Patient name: Rafa Crowley   YOB: 1970   MRN: [id number]  Date of visit: 05/20/2025    Dear Dr. Espinosa,    It was a pleasure seeing Mr. Crowley in the Colon and Rectal Surgery clinic here at [facility].     As you know, Mr. Crowley is a 55 year old man with no significant medical history who presents for evaluation of recurrent SBO. He was seen by Dr. Baltazar recently for recurrent SBO - she ordered a colonoscopy and MRE for evaluation. He underwent a laparoscopic, converted to open, lysis of adhesions in 2017 by Dr. Arreguin and was recently admitted for non-operative management of a pSBO. He is doing well - prior to his surgery in 2017 he had never had a bowel obstruction. He denies any pain, nausea or vomiting currently.    2017 - Exploratory laparotomy, lysis of adhesions - laparoscopic, converted to open, minimal adhesions described but they did lyse the band, he did not have any other evidence of pathology (operative note reviewed personally and with the patient)    12/17/2021 - CT AP - Images reviewed personally and with the patient, agree with location of transition  1. Mildly distended fluid-filled loops of proximal and mid small bowel suspicious for least partial small bowel obstruction with possible transition point in the right lower quadrant. No evidence of free intraperitoneal air or pneumatosis.    12/18/2021 - Small bowel follow through  No evidence of complete obstruction - antegrade contrast appreciated    2/22/2022  Since our last visit, colonoscopy was scheduled on 3/1/2022. MRE confirmed no evidence of mass or stricture. Remaining relatively asymptomatic.    MRE  Enterography - 2/21/2022  No findings to explain small-bowel obstruction.  No evidence of stricture or mass.  No imaging findings of small bowel inflammation.    4/13/2022  He has not had any obstructive symptoms since our last visit. He does note that the skin tags do cause pruritis and some difficulty with cleanliness.     Colonoscopy - 4/13/2022 - Follow-up in 10 years for screening  - Perianal skin tags found on perianal exam.   - The examined portion of the ileum was normal.   - The entire examined colon is normal on direct and retroflexion views.       2/18/2025  Here to discuss possible skin tag excision/hemorrhoidectomy. He is having mild discomfort and difficulty cleaning.     5/20/2025  Here for follow-up after hemorrhoidectomy. He is overall doing well - some pain but almost gone. He denies bleeding.     Pathology  1. Submitted as Hemorrhoid, left lateral (excision):  Benign skin with underlying congested vessels     2. Submitted as Hemorrhoid, right anterior (excision):  Benign skin with underlying congested vessels    The patient was informed of the availability of a certified  without charge. A certified  was not necessary for this visit.    Review of Systems  See pertinent review of systems above    Past Medical History:   Diagnosis Date    Bowel obstruction     Partial small bowel obstruction      Past Surgical History:   Procedure Laterality Date    ABDOMINAL SURGERY      COLONOSCOPY N/A 4/13/2022    Procedure: COLONOSCOPY;  Surgeon: Pietro Madsen MD;  [facility]);  Service: Endoscopy;  Laterality: N/A;  fully vaccinated    HEMORRHOIDECTOMY INVOLVING TWO OR MORE ANAL COLUMNS N/A 4/14/2025    Procedure: HEMORRHOIDECTOMY INVOLVING TWO OR MORE ANAL COLUMNS;  Surgeon: Pietro Madsen MD;  Location: [address];  Service: Colon and Rectal;  Laterality: N/A;    knee scope      KNEE SURGERY       Family History   Problem Relation Name Age of  "Onset    Cancer Mother          kidney    Cancer Father          bladder    Cancer Maternal Aunt          kidney    Inflammatory bowel disease Neg Hx      Colon cancer Neg Hx       Social History     Tobacco Use    Smoking status: Never    Smokeless tobacco: Former     Quit date: 3/13/2017   Substance Use Topics    Alcohol use: Yes     Comment: socially    Drug use: No     Review of patient's allergies indicates:   Allergen Reactions    Penicillins Hives       Current Outpatient Medications on File Prior to Visit   Medication Sig Dispense Refill    acetaminophen (TYLENOL) 500 MG tablet Take 2 tablets (1,000 mg total) by mouth every 8 (eight) hours as needed for Pain. 90 tablet 0    dextromethorphan (DELSYM) 30 mg/5 mL liquid Take 60 mg by mouth 2 (two) times daily.      dextromethorphan-guaiFENesin  mg (MUCINEX DM)  mg per 12 hr tablet Take 1 tablet by mouth every 12 (twelve) hours.      fluticasone propionate (FLONASE) 50 mcg/actuation nasal spray 1 spray (50 mcg total) by Each Nostril route once daily. 18.2 mL 0    ibuprofen (ADVIL,MOTRIN) 800 MG tablet Take 1 tablet (800 mg total) by mouth 3 (three) times daily. 30 tablet 0    oxyCODONE (ROXICODONE) 5 MG immediate release tablet Take 1 tablet (5 mg total) by mouth every 4 (four) hours as needed. 30 tablet 0    cetirizine (ZYRTEC) 10 MG tablet Take 1 tablet (10 mg total) by mouth once daily. 30 tablet 0    gabapentin (NEURONTIN) 100 MG capsule Take 2 capsules (200 mg total) by mouth 3 (three) times daily. for 14 days 84 capsule 0     No current facility-administered medications on file prior to visit.     Physical Examination  /75 (BP Location: Left arm, Patient Position: Sitting)   Pulse 91   Resp 18   Ht 5' 7" (1.702 m)   Wt 105 kg (231 lb 7.7 oz)   BMI 36.26 kg/m²      A chaperone was present for the physical examination.    Constitutional: well developed, no cough, no dyspnea, alert, and no acute distress    Head: Normocephalic, no " lesions, without obvious abnormality  Eye: Normal external eye, conjunctiva, and lids  Cardiovascular: regular rate and regular rhythm  Respiratory: normal air entry  Gastrointestinal: soft, non-tender, no midline hernia, scar well-healed  Musculoskeletal: full range of motion without pain  Neurologic: alert, oriented, normal speech, no focal findings or movement disorder noted  Psychiatric: appropriate, normal mood    Perianal exam  Healing well, small skin tag - would not recommend surgery for this    Assessment and Plan of Care    Thank you again for referring Mr. Crowley to my care. In summary, Mr. Crowley is a 55 year old man presenting with perianal skin tags. We discussed treatment options and have provided the following recommendations:    Follow-up with me as needed, back to work 5/27    Please do not hesitate to contact me if you have any questions.      Pietro Madsen MD - Staff Surgeon  Department of Colon & Rectal Surgery  [facility]

## (undated) DEVICE — PANTIES FEMININE NAPKIN LG/XLG

## (undated) DEVICE — SUT MCRYL PLUS 4-0 PS2 27IN

## (undated) DEVICE — SPONGE COTTON TRAY 4X4IN

## (undated) DEVICE — DRESSING ADH ISLAND 3.6 X 14

## (undated) DEVICE — TROCAR ENDOPATH EXCEL DILATING

## (undated) DEVICE — SUT VICRYL 3-0 27 SH

## (undated) DEVICE — SEE MEDLINE ITEM 157117

## (undated) DEVICE — DRAPE ABDOMINAL TIBURON 14X11

## (undated) DEVICE — PENCIL ROCKER SWITCH 10FT CORD

## (undated) DEVICE — ELECTRODE REM PLYHSV RETURN 9

## (undated) DEVICE — SYR 10CC LUER LOCK

## (undated) DEVICE — KIT ANTIFOG

## (undated) DEVICE — APPLIER CLIP ENDO MED/LG 10MM

## (undated) DEVICE — TRAY MINOR GEN SURG OMC

## (undated) DEVICE — CONTAINER SPECIMEN OR STER 4OZ

## (undated) DEVICE — TRAY MINOR GEN SURG

## (undated) DEVICE — SOL NS 1000CC

## (undated) DEVICE — GOWN SURGICAL X-LARGE

## (undated) DEVICE — SOL NACL IRR 1000ML BTL

## (undated) DEVICE — TIP YANKAUERS BULB NO VENT

## (undated) DEVICE — IRRIGATOR ENDOSCOPY DISP.

## (undated) DEVICE — LUBRICANT SURGILUBE 2 OZ

## (undated) DEVICE — NDL HYPO REG 25G X 1 1/2

## (undated) DEVICE — TROCAR ENDOPATH XCEL 12X100MM

## (undated) DEVICE — DRAPE UINDERBUT GRAD PCH

## (undated) DEVICE — SEE MEDLINE ITEM 152622

## (undated) DEVICE — LEGGING CLEAR POLY 2/PACK

## (undated) DEVICE — DISSECTOR LIGASURE EXACT 21CM

## (undated) DEVICE — TROCAR ENDOPATH EXCEL

## (undated) DEVICE — TUBING HF INSUFFLATION W/ FLTR